# Patient Record
Sex: FEMALE | Race: WHITE | Employment: OTHER | URBAN - METROPOLITAN AREA
[De-identification: names, ages, dates, MRNs, and addresses within clinical notes are randomized per-mention and may not be internally consistent; named-entity substitution may affect disease eponyms.]

---

## 2017-12-29 ENCOUNTER — HOSPITAL ENCOUNTER (OUTPATIENT)
Dept: MAMMOGRAPHY | Age: 77
Discharge: HOME | End: 2017-12-29
Attending: INTERNAL MEDICINE
Payer: MEDICARE

## 2017-12-29 DIAGNOSIS — Z12.31 VISIT FOR SCREENING MAMMOGRAM: ICD-10-CM

## 2017-12-29 PROCEDURE — 77067 SCR MAMMO BI INCL CAD: CPT

## 2018-03-07 PROBLEM — R30.0 DYSURIA: Status: ACTIVE | Noted: 2018-03-07

## 2018-03-07 PROBLEM — B37.31 YEAST VAGINITIS: Status: ACTIVE | Noted: 2018-03-07

## 2018-04-03 ENCOUNTER — HOSPITAL ENCOUNTER (OUTPATIENT)
Dept: PHYSICAL THERAPY | Age: 78
Discharge: HOME | End: 2018-04-03
Payer: MEDICARE

## 2018-04-03 ENCOUNTER — HOSPITAL ENCOUNTER (OUTPATIENT)
Dept: SURGERY | Age: 78
Discharge: HOME | End: 2018-04-03
Payer: MEDICARE

## 2018-04-03 VITALS
BODY MASS INDEX: 33.43 KG/M2 | HEIGHT: 66 IN | TEMPERATURE: 96.9 F | WEIGHT: 208 LBS | DIASTOLIC BLOOD PRESSURE: 70 MMHG | OXYGEN SATURATION: 96 % | HEART RATE: 59 BPM | SYSTOLIC BLOOD PRESSURE: 149 MMHG | RESPIRATION RATE: 18 BRPM

## 2018-04-03 LAB
ANION GAP SERPL CALC-SCNC: 6 MMOL/L (ref 7–16)
APPEARANCE UR: CLEAR
APTT PPP: 43 SEC (ref 23.2–35.3)
BACTERIA SPEC CULT: NORMAL
BASOPHILS # BLD: 0.1 K/UL (ref 0–0.2)
BASOPHILS NFR BLD: 1 % (ref 0–2)
BILIRUB UR QL: NEGATIVE
BUN SERPL-MCNC: 13 MG/DL (ref 8–23)
CALCIUM SERPL-MCNC: 9.1 MG/DL (ref 8.3–10.4)
CHLORIDE SERPL-SCNC: 102 MMOL/L (ref 98–107)
CO2 SERPL-SCNC: 30 MMOL/L (ref 21–32)
COLOR UR: YELLOW
CREAT SERPL-MCNC: 0.72 MG/DL (ref 0.6–1)
DIFFERENTIAL METHOD BLD: NORMAL
EOSINOPHIL # BLD: 0.3 K/UL (ref 0–0.8)
EOSINOPHIL NFR BLD: 5 % (ref 0.5–7.8)
ERYTHROCYTE [DISTWIDTH] IN BLOOD BY AUTOMATED COUNT: 13 % (ref 11.9–14.6)
GLUCOSE SERPL-MCNC: 103 MG/DL (ref 65–100)
GLUCOSE UR STRIP.AUTO-MCNC: NEGATIVE MG/DL
HCT VFR BLD AUTO: 42 % (ref 35.8–46.3)
HGB BLD-MCNC: 13.9 G/DL (ref 11.7–15.4)
HGB UR QL STRIP: NEGATIVE
IMM GRANULOCYTES # BLD: 0 K/UL (ref 0–0.5)
IMM GRANULOCYTES NFR BLD AUTO: 0 % (ref 0–5)
INR PPP: 0.9
KETONES UR QL STRIP.AUTO: NEGATIVE MG/DL
LEUKOCYTE ESTERASE UR QL STRIP.AUTO: NEGATIVE
LYMPHOCYTES # BLD: 1.9 K/UL (ref 0.5–4.6)
LYMPHOCYTES NFR BLD: 27 % (ref 13–44)
MCH RBC QN AUTO: 29.5 PG (ref 26.1–32.9)
MCHC RBC AUTO-ENTMCNC: 33.1 G/DL (ref 31.4–35)
MCV RBC AUTO: 89.2 FL (ref 79.6–97.8)
MONOCYTES # BLD: 0.5 K/UL (ref 0.1–1.3)
MONOCYTES NFR BLD: 7 % (ref 4–12)
NEUTS SEG # BLD: 4.2 K/UL (ref 1.7–8.2)
NEUTS SEG NFR BLD: 60 % (ref 43–78)
NITRITE UR QL STRIP.AUTO: NEGATIVE
PH UR STRIP: 7 [PH] (ref 5–9)
PLATELET # BLD AUTO: 258 K/UL (ref 150–450)
PMV BLD AUTO: 11.3 FL (ref 10.8–14.1)
POTASSIUM SERPL-SCNC: 4.1 MMOL/L (ref 3.5–5.1)
PROT UR STRIP-MCNC: NEGATIVE MG/DL
PROTHROMBIN TIME: 12.5 SEC (ref 11.5–14.5)
RBC # BLD AUTO: 4.71 M/UL (ref 4.05–5.25)
SERVICE CMNT-IMP: NORMAL
SODIUM SERPL-SCNC: 138 MMOL/L (ref 136–145)
SP GR UR REFRACTOMETRY: 1.01 (ref 1–1.02)
UROBILINOGEN UR QL STRIP.AUTO: 0.2 EU/DL (ref 0.2–1)
WBC # BLD AUTO: 7.1 K/UL (ref 4.3–11.1)

## 2018-04-03 PROCEDURE — 80048 BASIC METABOLIC PNL TOTAL CA: CPT | Performed by: PHYSICIAN ASSISTANT

## 2018-04-03 PROCEDURE — G8980 MOBILITY D/C STATUS: HCPCS

## 2018-04-03 PROCEDURE — 97161 PT EVAL LOW COMPLEX 20 MIN: CPT

## 2018-04-03 PROCEDURE — G8978 MOBILITY CURRENT STATUS: HCPCS

## 2018-04-03 PROCEDURE — 85730 THROMBOPLASTIN TIME PARTIAL: CPT | Performed by: PHYSICIAN ASSISTANT

## 2018-04-03 PROCEDURE — G8979 MOBILITY GOAL STATUS: HCPCS

## 2018-04-03 PROCEDURE — 85610 PROTHROMBIN TIME: CPT | Performed by: PHYSICIAN ASSISTANT

## 2018-04-03 PROCEDURE — 87641 MR-STAPH DNA AMP PROBE: CPT | Performed by: PHYSICIAN ASSISTANT

## 2018-04-03 PROCEDURE — 85025 COMPLETE CBC W/AUTO DIFF WBC: CPT | Performed by: PHYSICIAN ASSISTANT

## 2018-04-03 PROCEDURE — 81003 URINALYSIS AUTO W/O SCOPE: CPT | Performed by: PHYSICIAN ASSISTANT

## 2018-04-03 RX ORDER — CHOLECALCIFEROL (VITAMIN D3) 125 MCG
CAPSULE ORAL DAILY
COMMUNITY

## 2018-04-03 RX ORDER — ACETAMINOPHEN 500 MG
TABLET ORAL
COMMUNITY
End: 2018-10-22

## 2018-04-03 NOTE — PERIOP NOTES
Recent Results (from the past 12 hour(s))   CBC WITH AUTOMATED DIFF    Collection Time: 04/03/18 10:30 AM   Result Value Ref Range    WBC 7.1 4.3 - 11.1 K/uL    RBC 4.71 4.05 - 5.25 M/uL    HGB 13.9 11.7 - 15.4 g/dL    HCT 42.0 35.8 - 46.3 %    MCV 89.2 79.6 - 97.8 FL    MCH 29.5 26.1 - 32.9 PG    MCHC 33.1 31.4 - 35.0 g/dL    RDW 13.0 11.9 - 14.6 %    PLATELET 026 100 - 356 K/uL    MPV 11.3 10.8 - 14.1 FL    DF AUTOMATED      NEUTROPHILS 60 43 - 78 %    LYMPHOCYTES 27 13 - 44 %    MONOCYTES 7 4.0 - 12.0 %    EOSINOPHILS 5 0.5 - 7.8 %    BASOPHILS 1 0.0 - 2.0 %    IMMATURE GRANULOCYTES 0 0.0 - 5.0 %    ABS. NEUTROPHILS 4.2 1.7 - 8.2 K/UL    ABS. LYMPHOCYTES 1.9 0.5 - 4.6 K/UL    ABS. MONOCYTES 0.5 0.1 - 1.3 K/UL    ABS. EOSINOPHILS 0.3 0.0 - 0.8 K/UL    ABS. BASOPHILS 0.1 0.0 - 0.2 K/UL    ABS. IMM.  GRANS. 0.0 0.0 - 0.5 K/UL   PROTHROMBIN TIME + INR    Collection Time: 04/03/18 10:30 AM   Result Value Ref Range    Prothrombin time 12.5 11.5 - 14.5 sec    INR 0.9     PTT    Collection Time: 04/03/18 10:30 AM   Result Value Ref Range    aPTT 43.0 (H) 23.2 - 33.1 SEC   METABOLIC PANEL, BASIC    Collection Time: 04/03/18 10:30 AM   Result Value Ref Range    Sodium 138 136 - 145 mmol/L    Potassium 4.1 3.5 - 5.1 mmol/L    Chloride 102 98 - 107 mmol/L    CO2 30 21 - 32 mmol/L    Anion gap 6 (L) 7 - 16 mmol/L    Glucose 103 (H) 65 - 100 mg/dL    BUN 13 8 - 23 MG/DL    Creatinine 0.72 0.6 - 1.0 MG/DL    GFR est AA >60 >60 ml/min/1.73m2    GFR est non-AA >60 >60 ml/min/1.73m2    Calcium 9.1 8.3 - 10.4 MG/DL   URINALYSIS W/ RFLX MICROSCOPIC    Collection Time: 04/03/18 10:30 AM   Result Value Ref Range    Color YELLOW      Appearance CLEAR      Specific gravity 1.010 1.001 - 1.023      pH (UA) 7.0 5.0 - 9.0      Protein NEGATIVE  NEG mg/dL    Glucose NEGATIVE  mg/dL    Ketone NEGATIVE  NEG mg/dL    Bilirubin NEGATIVE  NEG      Blood NEGATIVE  NEG      Urobilinogen 0.2 0.2 - 1.0 EU/dL    Nitrites NEGATIVE  NEG      Leukocyte Esterase NEGATIVE  NEG

## 2018-04-03 NOTE — PROGRESS NOTES
Katie Willis  : (25 y.o.) [address] [facility] [address]  [phone]       Physical Therapy Prehab Plan of Treatment and Evaluation Summary:4/3/2018    ICD-10: Treatment Diagnosis:   · Pain in Left Knee (M25.562)  · Stiffness of Left Knee, Not elsewhere classified (M25.662)  · Difficulty in walking, Not elsewhere classified (R26.2)  Precautions/Allergies:   Review of patient's allergies indicates no known allergies. MEDICAL/REFERRING DIAGNOSIS:  Unilateral primary osteoarthritis, left knee [M17.12]  REFERRING PHYSICIAN: Kulwinder Myers, *  DATE OF SURGERY: 18   Assessment:   Comments:  Pt. Plans to go home with spouse. She had a right tka 2 years ago. PROBLEM LIST (Impacting functional limitations):  Ms. Steve Kiran presents with the following left lower extremity(s) problems:  1. Strength  2. Range of Motion  3. Home Exercise Program  4. Pain   INTERVENTIONS PLANNED:  1. Home Exercise Program  2. Educational Discussion     TREATMENT PLAN: Effective Dates: 4/3/2018 TO 4/3/2018. Frequency/Duration: Patient to continue to perform home exercise program at least twice per day up until her surgery. GOALS: (Goals have been discussed and agreed upon with patient.)  Discharge Goals: Time Frame: 1 Day  1. Patient will demonstrate independence with a home exercise program designed to increase strength, range of motion and pain control to minimize functional deficits and optimize patient for total joint replacement. Rehabilitation Potential For Stated Goals: Good  Regarding [de-identified] M Alphonso's therapy, I certify that the treatment plan above will be carried out by a therapist or under their direction.   Thank you for this referral,  Chester Hansen, PT               HISTORY:   Present Symptoms:  Pain Intensity 1:  (3 at worst)  Pain Location 1: Knee   History of Present Injury/Illness (Reason for Referral):  Medical/Referring Diagnosis: Unilateral primary osteoarthritis, left knee [M17.12]   Past Medical History/Comorbidities:   Ms. Tres Pinon  has a past medical history of Asthma; CAD (coronary artery disease); COPD; Graves' disease; Hyperlipidemia (11/16/2012); Hypertension; Menopause; Obesity (BMI 30-39.9); Routine health maintenance (11/16/2012); Shortness of breath (5/10/2016); Status post total right knee replacement (2/1/2016); Thyroid disease; Tremor (11/16/2012); and Ulcerative colitis ([facility] [address] (11/7/2013). She also has no past medical history of Adverse effect of anesthesia; Difficult intubation; Malignant hyperthermia due to anesthesia; Nausea & vomiting; Pseudocholinesterase deficiency; or Unspecified adverse effect of anesthesia. Ms. Tres Pinon  has a past surgical history that includes hx lap cholecystectomy; hx hysterectomy; pr cardiac surg procedure unlist (2012); hx heent; hx tonsil and adenoidectomy; hx heent; hx ptca (06/13/12); hx knee replacement (Right, 02/01/2016); and hx cataract removal (Left, 08/15/2016).   Social History/Living Environment:   Home Environment: Private residence  # Steps to Enter: 1  Rails to Enter: Yes  One/Two Story Residence: One story  Living Alone: No  Support Systems: Spouse/Significant Other/Partner  Patient Expects to be Discharged to[de-identified] Private residence  Current DME Used/Available at Home: Elieser Miners, rollator, Elieser Miners, rolling, Jacqulyne Skeeters, straight, Commode, bedside, Shower chair, Wheelchair  Tub or Shower Type: Shower  Work/Activity:  retired  Dominant Side:  RIGHT  Current Medications:  See Pre-assessment nursing note   Number of Personal Factors/Comorbidities that affect the Plan of Care: 1-2: MODERATE COMPLEXITY   EXAMINATION:   ADLs (Current Functional Status):   Ambulation:  [x] Independent  [] Walk Indoors Only  [] Walk Outdoors  [] Use Assistive Device  [] Use Wheelchair Only Dressing:  [x] Independent  Requires Assistance from Someone for:  [] Sock/Shoes  [] Pants  [] Everything   Bathing/Showering:   [x] Independent  [] Requires Assistance from Someone  [] Sponge Bath Only Household Activities:  [x] Routine house and yard work  [] Light Housework Only  [] None   Observation/Orthostatic Postural Assessment:       ROM/Flexibility:   Gross Assessment: Yes  AROM: Within functional limits (right LE)                LLE Assessment  LLE Assessment (WDL): Exception to WDL  LLE AROM  L Knee Flexion: 121  L Knee Extension: 5          Strength:   Gross Assessment: Yes  Strength: Generally decreased, functional (right LE)       LLE Strength  L Knee Flexion: 4  L Knee Extension: 4          Functional Mobility:    Gross Assessment: Yes    Gait Description (WDL): Exceptions to WDL  Stand to Sit: Independent, Additional time  Sit to Stand: Independent, Additional time  Distance (ft): 250 Feet (ft)  Ambulation - Level of Assistance: Independent  Speed/Lupe: Delayed  Stance: Left decreased  Gait Abnormalities: Antalgic          Balance:    Sitting: Intact  Standing: Intact   Body Structures Involved:  1. Bones  2. Joints  3. Muscles  4. Ligaments Body Functions Affected:  1. Movement Related Activities and Participation Affected:  1. Mobility   Number of elements that affect the Plan of Care: 3: MODERATE COMPLEXITY   CLINICAL PRESENTATION:   Presentation: Stable and uncomplicated: LOW COMPLEXITY   CLINICAL DECISION MAKING:   Outcome Measure: Tool Used: Lower Extremity Functional Scale (LEFS)  Score:  Initial: 63/80 Most Recent: X/80 (Date: -- )   Interpretation of Score: 20 questions each scored on a 5 point scale with 0 representing \"extreme difficulty or unable to perform\" and 4 representing \"no difficulty\". The lower the score, the greater the functional disability. 80/80 represents no disability. Minimal detectable change is 9 points. Score 80 79-65 64-49 48-33 32-17 16-1 0   Modifier CH CI CJ CK CL CM CN     ?  Mobility - Walking and Moving Around:     - CURRENT STATUS: CJ - 20%-39% impaired, limited or restricted    - GOAL STATUS: CJ - 20%-39% impaired, limited or restricted    - D/C STATUS:  CJ - 20%-39% impaired, limited or restricted  Medical Necessity:   · Ms. Chong Sheridan is expected to optimize her lower extremity strength and ROM in preparation for joint replacement surgery. Reason for Services/Other Comments:  · Achieve baseline assesment of musculoskeletal system, functional mobility and home environment. , educate in PT HEP in preparation for surgery, educate in hospital plan of care. Use of outcome tool(s) and clinical judgement create a POC that gives a: Clear prediction of patient's progress: LOW COMPLEXITY   TREATMENT:   Treatment/Session Assessment:  Patient was instructed in PT- HEP to increase strength and ROM in LEs. Answered all questions. · Post session pain:  No complaints  · Compliance with Program/Exercises: compliant most of the time.   Total Treatment Duration:  PT Patient Time In/Time Out  Time In: 1030  Time Out: [address]

## 2018-04-03 NOTE — PROGRESS NOTES
04/03/18 1030   Oxygen Therapy   O2 Sat (%) 95 %   Pulse via Oximetry 65 beats per minute   O2 Device Room air   Pre-Treatment   Breath Sounds Bilateral Clear;Diminished   Pre FEV1 (liters) 1.6 liters   % Predicted 75   Incentive Spirometry Treatment   Actual Volume (ml) 1750 ml     Initial respiratory Assessment completed with pt. Pt was interviewed and evaluated in Joint camp prior to surgery. Patient ID:  Chandrakant Waggoner  [id number]  55 y.o.  1940  Surgeon: Dr. Jannet Gonsalez  Date of Surgery: 4/23/2018  Procedure: Total Left Knee Arthroplasty  Primary Care Physician: Candace Merino -[phone]  Specialists: [facility] P[ULMONARY- LAST APPOINTMENT DEC. 2016                                 Pt instructed in the use of Incentive Spirometry. Pt instructed to bring Incentive Spirometer back on date of surgery & to start using Is upon return to pt room. Pt taught proper cough technique    History of smoking:  SOCIALLY FOR 10 YEARS                                 FORMER                          Quit date:            Secondhand smoke:SPOUSE      Past procedures with Oxygen desaturation:NONE    Past Medical History:   Diagnosis Date    Asthma with COPD ([address]     Managed with daily and PRN inhaler     CAD (coronary artery disease)     05/2012: 3.0x 18 Xience MLAD, POBA D1, 11/2017: Normal stress MPI. Followed by [facility].  Environmental and seasonal allergies     H/O echocardiogram 11/14/2016    EF 71%    History of nuclear stress test 12/04/2017    EF: 70%    Hx of Graves' disease     Hyperlipidemia 11/16/2012    controlled with medication     Hypertension     controlled with medication     Menopause     Obesity (BMI 30-39. 9)     Osteoarthritis     Routine health maintenance 11/16/2012    S/P PTCA (percutaneous transluminal coronary angioplasty) 2012    Patient takes daily 81 mg ASA-Followed by [facility]     Shortness of breath 05/10/2016    resolved-patient reports no recent SOB     Status post total right knee replacement 2016    Thyroid disease     Tremor 2012    hands     Ulcerative colitis ([address] 2013                                   HX OF ASTHMA, COPD, PNA X 3                                                                                                                      Respiratory history:DENIES SOB                                                               Respiratory meds:  SYMBICORT , ALBUTEROL PRN                                       FAMILY PRESENT:            SPOUSE,                                                                                          PAST SLEEP STUDY:                          NO  HX OF CLAIRE:                                     NO                                     CLAIRE assessment:                                                         PHYSICAL EXAM   Body mass index is 33.57 kg/(m^2).    Visit Vitals    /70 (BP 1 Location: Left arm, BP Patient Position: At rest;Sitting)    Pulse (!) 59    Temp 96.9 °F (36.1 °C)    Resp 18    Ht 5' 6\" (1.676 m)    Wt 94.3 kg (208 lb)    SpO2 96%    BMI 33.57 kg/m2     Neck circumference: 39     cm    Loud snoring:                                 DENIES    Witnessed apnea or wakening gasping or choking:,             DENIES,                                                                                                  Awakens with headaches:                                                  DENIES    Morning or daytime tiredness/ sleepiness:                    DENIES                                                                                        Dry mouth or sore throat in morning:                                                                                       DENIES    Wagner stage:  2-3    SACS score:8    STOP/BAN                              CPAP:                       NONE                             ALBUTEROL NEB Q6 PRN          SPACER Referrals:    Pt. Phone Number:

## 2018-04-03 NOTE — PERIOP NOTES
Patient verified name, , and surgery as listed in [facility]. Type 3 surgery, PAT joint assessment complete. Labs per surgeon: cbc, bmp, ua, pt/inr, ptt, mrsa/mssa swab, T&S DOS; PTT results to be reviewed by anesthesia, all other results within anesthesia limits. Labs per anesthesia protocol: All required lab work included in surgeon's orders. EKG: completed 17; results within anesthesia limits. Cardiology note with surgical clearance (18), Echo (16), Nuclear Perfusion Study (17), and Cath report (5/15/12) placed on chart for anesthesia reference. Patient did not bring cardiac stent card to PAT appointment. Hibiclens and instructions to return bottle on DOS given per hospital policy. Nasal Swab collected per MD order and instructions for Mupirocin nasal ointment if required. Patient provided with handouts including Guide to Surgery, Pain Management, Hand Hygiene, Blood Transfusion Education, and [place] [facility] Brochure. Patient answered medical/surgical history questions at their best of ability. All prior to admission medications documented in [facility]. Original medication prescription bottle NOT visualized during patient appointment. Patient instructed to hold all vitamins 7 days prior to surgery and NSAIDS 5 days prior to surgery. Medications to be held: all vitamins/supplements. Patient instructed to continue previous medications as prescribed prior to surgery and to take the following medications the day of surgery according to anesthesia guidelines with a small sip of water: Metoprolol, Levothyroxine, Simvastatin, Symbicort, Flonase, ProAir, Tylenol if needed, and 81 mg ASA. Patient instructed to bring all inhalers, bottle of Hibiclens, and incentive spirometer to the hospital on the DOS. Patient teach back successful and patient demonstrates knowledge of instruction.

## 2018-04-03 NOTE — PERIOP NOTES
Labs dated 4/3/18 routed via [address] [facility] to patients PCP, Dr. Ivon Paul, per Dr. Bryanna Silva' request.

## 2018-04-03 NOTE — PERIOP NOTES
Dr. Gilford Peer (anesthesia) reviewed PTT results. Patient is to return for repeat PTT prior to surgery. PTT order signed and held in [address] [facility]. Spoke with patient over the phone and informed her of abnormal PTT results. Patient instructed to come to Entrance B/Outpatient lab any week day prior to surgery, between 0830 and 1600, to have PTT rechecked. Patient verbalized understanding. Charge nurse to follow up.

## 2018-04-05 ENCOUNTER — HOSPITAL ENCOUNTER (OUTPATIENT)
Dept: LAB | Age: 78
Discharge: HOME | End: 2018-04-05
Attending: ORTHOPAEDIC SURGERY
Payer: MEDICARE

## 2018-04-05 LAB — APTT PPP: 39.3 SEC (ref 23.2–35.3)

## 2018-04-05 PROCEDURE — 36415 COLL VENOUS BLD VENIPUNCTURE: CPT | Performed by: ORTHOPAEDIC SURGERY

## 2018-04-05 PROCEDURE — 85730 THROMBOPLASTIN TIME PARTIAL: CPT | Performed by: ORTHOPAEDIC SURGERY

## 2018-04-09 NOTE — ADVANCED PRACTICE NURSE
Total Joint Surgery Preoperative Chart Review      Patient ID:  Nahun Saha  [id number]  72 y.o.  1940  Surgeon: Dr. Gage Cordero  Date of Surgery: 4/23/2018  Procedure: Total Left Knee Arthroplasty  Primary Care Physician: Michael Garner -[phone]  Specialty Physician(s):      Subjective:   Nahun Saha is a 77 y.o. WHITE OR  female who presents for preoperative evaluation for Total Left Knee arthroplasty. This is a preoperative chart review note based on data collected by the nurse at the surgical Pre-Assessment visit. Past Medical History:   Diagnosis Date    Asthma with COPD ([facility] [address]     Managed with daily and PRN inhaler     CAD (coronary artery disease)     05/2012: 3.0x 18 Xience MLAD, POBA D1, 11/2017: Normal stress MPI. Followed by [facility].  Environmental and seasonal allergies     H/O echocardiogram 11/14/2016    EF 71%    History of nuclear stress test 12/04/2017    EF: 70%    Hx of Graves' disease     Hyperlipidemia 11/16/2012    controlled with medication     Hypertension     controlled with medication     Menopause     Obesity (BMI 30-39. 9)     Osteoarthritis     Routine health maintenance 11/16/2012    S/P PTCA (percutaneous transluminal coronary angioplasty) 2012    Patient takes daily 81 mg ASA-Followed by [facility]     Shortness of breath 05/10/2016    resolved-patient reports no recent SOB     Status post total right knee replacement 2/1/2016    Thyroid disease     Tremor 11/16/2012    hands     Ulcerative colitis ([facility] [address] 11/7/2013      Past Surgical History:   Procedure Laterality Date    HX ANKLE FRACTURE TX Right 1956    HX CATARACT REMOVAL Left 08/15/2016    HX HEENT  1981    thyroid radioactive iodine tx    HX HEENT      Bridge in Mouth    HX HYSTERECTOMY  1983    HX KNEE REPLACEMENT Right 02/01/2016    HX LAP CHOLECYSTECTOMY  2002    HX PTCA  2012    3.0x1.8 Xience mLAD POBA D1    [id number] [employer] Family History   Problem Relation Age of Onset    Heart Disease Mother     Arthritis-osteo Father     Hypertension Sister     Diabetes Sister     Hypertension Brother     Hypertension Brother     Hypertension Brother     Cancer Brother     Hypertension Brother     Breast Cancer Paternal Aunt 79      Social History   Substance Use Topics    Smoking status: Former Smoker     Packs/day: 2.00     Years: 10.00     Types: Cigarettes     Quit date: 7/5/1970    Smokeless tobacco: Never Used      Comment: social smoker 1-2 qd    Alcohol use Yes      Comment: occ,. social       Prior to Admission medications    Medication Sig Start Date End Date Taking? Authorizing Provider   acetaminophen (TYLENOL EXTRA STRENGTH) 500 mg tablet Take  by mouth every six (6) hours as needed for Pain. Yes Historical Provider   cholecalciferol, vitamin D3, (VITAMIN D3) 2,000 unit tab Take  by mouth daily. Yes Historical Provider   losartan (COZAAR) 100 mg tablet TAKE 1 TABLET BY MOUTH  DAILY FOR HYPERTENSION 3/19/18  Yes Delmar Narvaez MD   levothyroxine (SYNTHROID) 137 mcg tablet Take 137 mcg by mouth Daily (before breakfast). 10/27/17  Yes Delmar Narvaez MD   metoprolol succinate (TOPROL-XL) 25 mg XL tablet Take 1 Tab by mouth daily. 10/27/17  Yes Delmar Narvaez MD   simvastatin (ZOCOR) 40 mg tablet Take 1 Tab by mouth nightly. Patient taking differently: Take 40 mg by mouth daily. 10/27/17  Yes Delmar Narvaez MD   albuterol (PROVENTIL HFA, VENTOLIN HFA, PROAIR HFA) 90 mcg/actuation inhaler Take 2 Puffs by inhalation every four (4) hours as needed for Wheezing. 10/26/17  Yes Ayala Maier NP   fluticasone (FLONASE) 50 mcg/actuation nasal spray 2 Sprays by Both Nostrils route daily. 8/15/17  Yes Mariely Wynn MD   aspirin delayed-release 81 mg tablet Take  by mouth daily.    Yes Historical Provider   budesonide-formoterol (SYMBICORT) 80-4.5 mcg/actuation HFAA inhaler Take 2 Puffs by inhalation two (2) times a day. 12/14/16  Yes Judson Ren MD   multivitamin (DAILY MULTIPLE) tablet Take 1 Tab by mouth daily. Hold 7 days prior to surgery per anesthesia protocol   Yes Historical Provider     No Known Allergies       Objective:     Physical Exam:     Visit Vitals    /70 (BP 1 Location: Left arm, BP Patient Position: At rest;Sitting)    Pulse (!) 59    Temp 96.9 °F (36.1 °C)    Resp 18    Ht 5' 6\" (1.676 m)    Wt 94.3 kg (208 lb)    SpO2 96%    BMI 33.57 kg/m2       ECG:    EKG Results     None          Data Review:   Labs:     reviewed    Problem List:  )  Patient Active Problem List   Diagnosis Code    Coronary atherosclerosis of native coronary artery I25.10    S/P PTCA (percutaneous transluminal coronary angioplasty) Z98.61    Dyslipidemia E78.5    Hypertensive cardiovascular disease I11.9    Obesity (BMI 30-39. 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 Patient would benefit from inpatient hospitalization with total knee surgery. Albuterol every 6 hours as need during hospitalization.          Signed By: SADAF Mancuso    April 9, 2018

## 2018-04-18 PROBLEM — B37.31 YEAST VAGINITIS: Status: RESOLVED | Noted: 2018-03-07 | Resolved: 2018-04-18

## 2018-04-18 PROBLEM — R30.0 DYSURIA: Status: RESOLVED | Noted: 2018-03-07 | Resolved: 2018-04-18

## 2018-04-19 NOTE — H&P
[id number] [facility]  Pre Operative History and Physical Exam    Patient ID:  Yesenia Manrique  [id number]  67 y.o.  1940    Today: April 19, 2018       Assessment:   1. Arthritis of the left knee        Plan:    1. Proceed with scheduled Procedure(s) (LRB):  KNEE ARTHROPLASTY TOTAL LEFT/ SUE/ FNB (Left)            CC: Left knee pain    HPI:   The patient has end stage arthritis of the left knee. The patient was evaluated and examined during a consultation prior to this office visit. There have been no changes to the patient's orthopedic condition since the initial consultation. The patient has failed previous conservative treatment for this condition including antiinflammatories , and lifestyle modifications. The necessity for joint replacement is present. The patient will be admitted the day of surgery for Procedure(s) (LRB):  KNEE ARTHROPLASTY TOTAL LEFT/ SUE/ FNB (Left)      Past Medical/Surgical History:  Past Medical History:   Diagnosis Date    Asthma with COPD ([address]     Managed with daily and PRN inhaler     CAD (coronary artery disease)     05/2012: 3.0x 18 Xience MLAD, POBA D1, 11/2017: Normal stress MPI. Followed by [address] Card[facility].  Environmental and seasonal allergies     H/O echocardiogram 11/14/2016    EF 71%    History of nuclear stress test 12/04/2017    EF: 70%    Hx of Graves' disease     Hyperlipidemia 11/16/2012    controlled with medication     Hypertension     controlled with medication     Menopause     Obesity (BMI 30-39. 9)     Osteoarthritis     Routine health maintenance 11/16/2012    S/P PTCA (percutaneous transluminal coronary angioplasty) 2012    Patient takes daily 81 mg ASA-Followed by [address] Cardiology     Shortness of breath 05/10/2016    resolved-patient reports no recent SOB     Status post total right knee replacement 2/1/2016    Thyroid disease     Tremor 11/16/2012    hands     Ulcerative colitis ([address] 11/7/2013     Past Surgical History:   Procedure Laterality Date    HX ANKLE FRACTURE TX Right 1956    HX CATARACT REMOVAL Left 08/15/2016    HX HEENT  1981    thyroid radioactive iodine tx    HX HEENT      Bridge in Mouth    HX HYSTERECTOMY  1983    HX KNEE REPLACEMENT Right 02/01/2016    HX LAP CHOLECYSTECTOMY  2002    HX PTCA  2012    3.0x1.8 Xience mLAD POBA D1    HX TONSIL AND ADENOIDECTOMY  1959        Allergies: No Known Allergies     Physical Exam:   General: NAD, Alert, Oriented, Appears their stated age     [de-identified]: NC/AT, PERRL    Skin: No rashes, lesions or wounds seen      Psych: normal affect      Heart: Regular Rate, Rhythm     Lungs: unlabored respirations, normal breath sounds     Abdomen: Soft and non-distended     Ortho: Pain with limited ROM of the left knee    Neuro: no focal defects, sensation is equal bilaterally     Lymph: no lymphadenopathy     Meds:   No current facility-administered medications for this encounter. Current Outpatient Prescriptions   Medication Sig    acetaminophen (TYLENOL EXTRA STRENGTH) 500 mg tablet Take  by mouth every six (6) hours as needed for Pain.  cholecalciferol, vitamin D3, (VITAMIN D3) 2,000 unit tab Take  by mouth daily.  losartan (COZAAR) 100 mg tablet TAKE 1 TABLET BY MOUTH  DAILY FOR HYPERTENSION    levothyroxine (SYNTHROID) 137 mcg tablet Take 137 mcg by mouth Daily (before breakfast).  metoprolol succinate (TOPROL-XL) 25 mg XL tablet Take 1 Tab by mouth daily.  simvastatin (ZOCOR) 40 mg tablet Take 1 Tab by mouth nightly. (Patient taking differently: Take 40 mg by mouth daily.)    albuterol (PROVENTIL HFA, VENTOLIN HFA, PROAIR HFA) 90 mcg/actuation inhaler Take 2 Puffs by inhalation every four (4) hours as needed for Wheezing.  fluticasone (FLONASE) 50 mcg/actuation nasal spray 2 Sprays by Both Nostrils route daily.  aspirin delayed-release 81 mg tablet Take  by mouth daily.     budesonide-formoterol (SYMBICORT) 80-4.5 mcg/actuation HFAA inhaler Take 2 Puffs by inhalation two (2) times a day.  multivitamin (DAILY MULTIPLE) tablet Take 1 Tab by mouth daily. Hold 7 days prior to surgery per anesthesia protocol         Labs:  Hospital Outpatient Visit on 04/05/2018   Component Date Value Ref Range Status    aPTT 04/05/2018 39.3* 23.2 - 35.3 SEC Final    Comment: Heparin Therapeutic Range = 74 - 123 seconds  In addition to factor deficiency, monitoring heparin therapy, etc., evaluation of a prolonged aPTT result should include consideration of preanalytic variables such as heparin flush contamination, specimen integrity issues, etc.  ** Note new reference range and method [facility] Outpatient Visit on 04/03/2018   Component Date Value Ref Range Status    WBC 04/03/2018 7.1  4.3 - 11.1 K/uL Final    RBC 04/03/2018 4.71  4.05 - 5.25 M/uL Final    HGB 04/03/2018 13.9  11.7 - 15.4 g/dL Final    HCT 04/03/2018 42.0  35.8 - 46.3 % Final    MCV 04/03/2018 89.2  79.6 - 97.8 FL Final    MCH 04/03/2018 29.5  26.1 - 32.9 PG Final    MCHC 04/03/2018 33.1  31.4 - 35.0 g/dL Final    RDW 04/03/2018 13.0  11.9 - 14.6 % Final    PLATELET 93/99/6951 897  150 - 450 K/uL Final    MPV 04/03/2018 11.3  10.8 - 14.1 FL Final    DF 04/03/2018 AUTOMATED    Final    NEUTROPHILS 04/03/2018 60  43 - 78 % Final    LYMPHOCYTES 04/03/2018 27  13 - 44 % Final    MONOCYTES 04/03/2018 7  4.0 - 12.0 % Final    EOSINOPHILS 04/03/2018 5  0.5 - 7.8 % Final    BASOPHILS 04/03/2018 1  0.0 - 2.0 % Final    IMMATURE GRANULOCYTES 04/03/2018 0  0.0 - 5.0 % Final    ABS. NEUTROPHILS 04/03/2018 4.2  1.7 - 8.2 K/UL Final    ABS. LYMPHOCYTES 04/03/2018 1.9  0.5 - 4.6 K/UL Final    ABS. MONOCYTES 04/03/2018 0.5  0.1 - 1.3 K/UL Final    ABS. EOSINOPHILS 04/03/2018 0.3  0.0 - 0.8 K/UL Final    ABS. BASOPHILS 04/03/2018 0.1  0.0 - 0.2 K/UL Final    ABS. IMM.  GRANS. 04/03/2018 0.0  0.0 - 0.5 K/UL Final    Prothrombin time 04/03/2018 12.5  11.5 - 14.5 sec Final    INR 04/03/2018 0.9 Final    aPTT 04/03/2018 43.0* 23.2 - 35.3 SEC Final    Comment: Heparin Therapeutic Range = 74 - 123 seconds  In addition to factor deficiency, monitoring heparin therapy, etc., evaluation of a prolonged aPTT result should include consideration of preanalytic variables such as heparin flush contamination, specimen integrity issues, etc.  ** Note new reference range and method **      Sodium 04/03/2018 138  136 - 145 mmol/L Final    Potassium 04/03/2018 4.1  3.5 - 5.1 mmol/L Final    Chloride 04/03/2018 102  98 - 107 mmol/L Final    CO2 04/03/2018 30  21 - 32 mmol/L Final    Anion gap 04/03/2018 6* 7 - 16 mmol/L Final    Glucose 04/03/2018 103* 65 - 100 mg/dL Final    Comment: 47 - 60 mg/dl Consistent with, but not fully diagnostic of hypoglycemia. 101 - 125 mg/dl Impaired fasting glucose/consistent with pre-diabetes mellitus  > 126 mg/dl Fasting glucose consistent with overt diabetes mellitus      BUN 04/03/2018 13  8 - 23 MG/DL Final    Creatinine 04/03/2018 0.72  0.6 - 1.0 MG/DL Final    GFR est AA 04/03/2018 >60  >60 ml/min/1.73m2 Final    GFR est non-AA 04/03/2018 >60  >60 ml/min/1.73m2 Final    Comment: (NOTE)  Estimated GFR is calculated using the Modification of Diet in Renal   Disease (MDRD) Study equation, reported for both  Americans   (GFRAA) and non- Americans (GFRNA), and normalized to 1.73m2   body surface area. The physician must decide which value applies to   the patient. The MDRD study equation should only be used in   individuals age 25 or older. It has not been validated for the   following: pregnant women, patients with serious comorbid conditions,   or on certain medications, or persons with extremes of body size,   muscle mass, or nutritional status.       Calcium 04/03/2018 9.1  8.3 - 10.4 MG/DL Final    Color 04/03/2018 YELLOW    Final    Appearance 04/03/2018 CLEAR    Final    Specific gravity 04/03/2018 1.010  1.001 - 1.023   Final    pH (UA) 04/03/2018 7.0 5.0 - 9.0   Final    Protein 04/03/2018 NEGATIVE   NEG mg/dL Final    Glucose 04/03/2018 NEGATIVE   mg/dL Final    Ketone 04/03/2018 NEGATIVE   NEG mg/dL Final    Bilirubin 04/03/2018 NEGATIVE   NEG   Final    Blood 04/03/2018 NEGATIVE   NEG   Final    Urobilinogen 04/03/2018 0.2  0.2 - 1.0 EU/dL Final    Nitrites 04/03/2018 NEGATIVE   NEG   Final    Leukocyte Esterase 04/03/2018 NEGATIVE   NEG   Final    Special Requests: 04/03/2018 NO SPECIAL REQUESTS    Final    Culture result: 04/03/2018 SA target not detected. A MRSA NEGATIVE, SA NEGATIVE test result does not preclude MRSA or SA nasal colonization. Final   Office Visit on 03/07/2018   Component Date Value Ref Range Status    Color 03/07/2018 Yellow  Straw-Yellow Final    Appearance 03/07/2018 Clear  Clear Final    Glucose 03/07/2018 Negative  Negative mg/dL Final    Bilirubin 03/07/2018 Negative  Negative mg/dL Final    Ketone 03/07/2018 Negative  Negative mg/dL Final    Specific gravity 03/07/2018 1.010  1.000 - 1.030 Final    Blood 03/07/2018 Negative  Negative VALARIE/UL Final    pH (UA) 03/07/2018 6.0  5.0 - 8.0 Final    Protein 03/07/2018 Negative  Negative mg/dL Final    Urobilinogen 03/07/2018 0.2 E.U./dL  0.0 - 1.0 E.U./dL Final    Nitrites 03/07/2018 Negative  Negative Final    Leukocyte Esterase 03/07/2018 Negative  Negative Nathaniel/uL Final                 Patient Active Problem List   Diagnosis Code    Coronary atherosclerosis of native coronary artery I25.10    S/P PTCA (percutaneous transluminal coronary angioplasty) Z98.61    Dyslipidemia E78.5    Hypertensive cardiovascular disease I11.9    Obesity (BMI 30-39. 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

## 2018-04-22 ENCOUNTER — ANESTHESIA EVENT (OUTPATIENT)
Dept: SURGERY | Age: 78
DRG: 470 | End: 2018-04-22
Payer: MEDICARE

## 2018-04-23 ENCOUNTER — HOME HEALTH ADMISSION (OUTPATIENT)
Dept: HOME HEALTH SERVICES | Facility: HOME HEALTH | Age: 78
End: 2018-04-23
Payer: MEDICARE

## 2018-04-23 ENCOUNTER — HOSPITAL ENCOUNTER (INPATIENT)
Age: 78
LOS: 2 days | Discharge: HOME HEALTH | DRG: 470 | End: 2018-04-25
Attending: ORTHOPAEDIC SURGERY | Admitting: ORTHOPAEDIC SURGERY
Payer: MEDICARE

## 2018-04-23 ENCOUNTER — ANESTHESIA (OUTPATIENT)
Dept: SURGERY | Age: 78
DRG: 470 | End: 2018-04-23
Payer: MEDICARE

## 2018-04-23 DIAGNOSIS — E66.9 OBESITY (BMI 30-39.9): Chronic | ICD-10-CM

## 2018-04-23 DIAGNOSIS — Z96.652 STATUS POST TOTAL LEFT KNEE REPLACEMENT: Primary | ICD-10-CM

## 2018-04-23 LAB
ABO + RH BLD: NORMAL
BLOOD GROUP ANTIBODIES SERPL: NORMAL
GLUCOSE BLD STRIP.AUTO-MCNC: 100 MG/DL (ref 65–100)
HGB BLD-MCNC: 12.5 G/DL (ref 11.7–15.4)
SPECIMEN EXP DATE BLD: NORMAL

## 2018-04-23 PROCEDURE — 77030037364 HC TIB INST CR  DISP STRY -C: Performed by: ORTHOPAEDIC SURGERY

## 2018-04-23 PROCEDURE — 85018 HEMOGLOBIN: CPT | Performed by: PHYSICIAN ASSISTANT

## 2018-04-23 PROCEDURE — 77030034849: Performed by: ORTHOPAEDIC SURGERY

## 2018-04-23 PROCEDURE — 94760 N-INVAS EAR/PLS OXIMETRY 1: CPT

## 2018-04-23 PROCEDURE — 74011250637 HC RX REV CODE- 250/637: Performed by: ANESTHESIOLOGY

## 2018-04-23 PROCEDURE — 76010010054 HC POST OP PAIN BLOCK: Performed by: ORTHOPAEDIC SURGERY

## 2018-04-23 PROCEDURE — 74011250636 HC RX REV CODE- 250/636: Performed by: ORTHOPAEDIC SURGERY

## 2018-04-23 PROCEDURE — 77030008467 HC STPLR SKN COVD -B: Performed by: ORTHOPAEDIC SURGERY

## 2018-04-23 PROCEDURE — 74011000250 HC RX REV CODE- 250: Performed by: ORTHOPAEDIC SURGERY

## 2018-04-23 PROCEDURE — 74011250637 HC RX REV CODE- 250/637: Performed by: PHYSICIAN ASSISTANT

## 2018-04-23 PROCEDURE — 76210000006 HC OR PH I REC 0.5 TO 1 HR: Performed by: ORTHOPAEDIC SURGERY

## 2018-04-23 PROCEDURE — 77030032490 HC SLV COMPR SCD KNE COVD -B

## 2018-04-23 PROCEDURE — 99221 1ST HOSP IP/OBS SF/LOW 40: CPT | Performed by: PHYSICAL MEDICINE & REHABILITATION

## 2018-04-23 PROCEDURE — 74011000250 HC RX REV CODE- 250

## 2018-04-23 PROCEDURE — 74011000258 HC RX REV CODE- 258: Performed by: ORTHOPAEDIC SURGERY

## 2018-04-23 PROCEDURE — 77030012935 HC DRSG AQUACEL BMS -B: Performed by: ORTHOPAEDIC SURGERY

## 2018-04-23 PROCEDURE — 77030003665 HC NDL SPN BBMI -A: Performed by: ANESTHESIOLOGY

## 2018-04-23 PROCEDURE — 74011250636 HC RX REV CODE- 250/636: Performed by: ANESTHESIOLOGY

## 2018-04-23 PROCEDURE — 74011000250 HC RX REV CODE- 250: Performed by: ANESTHESIOLOGY

## 2018-04-23 PROCEDURE — 97165 OT EVAL LOW COMPLEX 30 MIN: CPT

## 2018-04-23 PROCEDURE — 77030020268 HC MISC GENERAL SUPPLY: Performed by: ORTHOPAEDIC SURGERY

## 2018-04-23 PROCEDURE — 74011250636 HC RX REV CODE- 250/636

## 2018-04-23 PROCEDURE — 82962 GLUCOSE BLOOD TEST: CPT

## 2018-04-23 PROCEDURE — 77030007880 HC KT SPN EPDRL BBMI -B: Performed by: ANESTHESIOLOGY

## 2018-04-23 PROCEDURE — 77030037363 HC FEM INST CR  DISP STRY -C: Performed by: ORTHOPAEDIC SURGERY

## 2018-04-23 PROCEDURE — 86900 BLOOD TYPING SEROLOGIC ABO: CPT | Performed by: PHYSICIAN ASSISTANT

## 2018-04-23 PROCEDURE — 77030031139 HC SUT VCRL2 J&J -A: Performed by: ORTHOPAEDIC SURGERY

## 2018-04-23 PROCEDURE — 77030011208: Performed by: ORTHOPAEDIC SURGERY

## 2018-04-23 PROCEDURE — 76010000162 HC OR TIME 1.5 TO 2 HR INTENSV-TIER 1: Performed by: ORTHOPAEDIC SURGERY

## 2018-04-23 PROCEDURE — 0SRD0J9 REPLACEMENT OF LEFT KNEE JOINT WITH SYNTHETIC SUBSTITUTE, CEMENTED, OPEN APPROACH: ICD-10-PCS | Performed by: ORTHOPAEDIC SURGERY

## 2018-04-23 PROCEDURE — 97161 PT EVAL LOW COMPLEX 20 MIN: CPT

## 2018-04-23 PROCEDURE — 77030006789 HC BLD SAW OSC STRY -C: Performed by: ORTHOPAEDIC SURGERY

## 2018-04-23 PROCEDURE — 76060000034 HC ANESTHESIA 1.5 TO 2 HR: Performed by: ORTHOPAEDIC SURGERY

## 2018-04-23 PROCEDURE — 77030006720 HC BLD PAT RMR ZIMM -B: Performed by: ORTHOPAEDIC SURGERY

## 2018-04-23 PROCEDURE — 77030035236 HC SUT PDS STRATFX BARB J&J -B: Performed by: ORTHOPAEDIC SURGERY

## 2018-04-23 PROCEDURE — 77030018836 HC SOL IRR NACL ICUM -A: Performed by: ORTHOPAEDIC SURGERY

## 2018-04-23 PROCEDURE — 77030020782 HC GWN BAIR PAWS FLX 3M -B: Performed by: ANESTHESIOLOGY

## 2018-04-23 PROCEDURE — C1776 JOINT DEVICE (IMPLANTABLE): HCPCS | Performed by: ORTHOPAEDIC SURGERY

## 2018-04-23 PROCEDURE — 77030036688 HC BLNKT CLD THER S2SG -B

## 2018-04-23 PROCEDURE — C1713 ANCHOR/SCREW BN/BN,TIS/BN: HCPCS | Performed by: ORTHOPAEDIC SURGERY

## 2018-04-23 PROCEDURE — 77030002966 HC SUT PDS J&J -A: Performed by: ORTHOPAEDIC SURGERY

## 2018-04-23 PROCEDURE — 77030002912 HC SUT ETHBND J&J -A: Performed by: ORTHOPAEDIC SURGERY

## 2018-04-23 PROCEDURE — 76942 ECHO GUIDE FOR BIOPSY: CPT | Performed by: ORTHOPAEDIC SURGERY

## 2018-04-23 PROCEDURE — 65270000029 HC RM PRIVATE

## 2018-04-23 PROCEDURE — 77030003602 HC NDL NRV BLK BBMI -B: Performed by: ANESTHESIOLOGY

## 2018-04-23 PROCEDURE — 36415 COLL VENOUS BLD VENIPUNCTURE: CPT | Performed by: PHYSICIAN ASSISTANT

## 2018-04-23 PROCEDURE — 74011250636 HC RX REV CODE- 250/636: Performed by: PHYSICIAN ASSISTANT

## 2018-04-23 PROCEDURE — 77030013727 HC IRR FAN PULSVC ZIMM -B: Performed by: ORTHOPAEDIC SURGERY

## 2018-04-23 PROCEDURE — 77030011640 HC PAD GRND REM COVD -A: Performed by: ORTHOPAEDIC SURGERY

## 2018-04-23 DEVICE — (D)CEMENT BNE HV R 40GM -- DUPE USE ITEM 353850: Type: IMPLANTABLE DEVICE | Site: KNEE | Status: FUNCTIONAL

## 2018-04-23 DEVICE — CRUCIATE RETAINING FEMORAL
Type: IMPLANTABLE DEVICE | Site: KNEE | Status: FUNCTIONAL
Brand: TRIATHLON

## 2018-04-23 DEVICE — UNIVERSAL TIBIAL BASEPLATE
Type: IMPLANTABLE DEVICE | Site: KNEE | Status: FUNCTIONAL
Brand: TRIATHLON

## 2018-04-23 DEVICE — PATELLA
Type: IMPLANTABLE DEVICE | Site: KNEE | Status: FUNCTIONAL
Brand: TRIATHLON

## 2018-04-23 DEVICE — TIBIAL BEARING INSERT
Type: IMPLANTABLE DEVICE | Site: KNEE | Status: FUNCTIONAL
Brand: TRIATHLON

## 2018-04-23 RX ORDER — SODIUM CHLORIDE 0.9 % (FLUSH) 0.9 %
5-10 SYRINGE (ML) INJECTION AS NEEDED
Status: DISCONTINUED | OUTPATIENT
Start: 2018-04-23 | End: 2018-04-23 | Stop reason: HOSPADM

## 2018-04-23 RX ORDER — CELECOXIB 200 MG/1
200 CAPSULE ORAL ONCE
Status: COMPLETED | OUTPATIENT
Start: 2018-04-23 | End: 2018-04-23

## 2018-04-23 RX ORDER — NALOXONE HYDROCHLORIDE 0.4 MG/ML
.2-.4 INJECTION, SOLUTION INTRAMUSCULAR; INTRAVENOUS; SUBCUTANEOUS
Status: DISCONTINUED | OUTPATIENT
Start: 2018-04-23 | End: 2018-04-25 | Stop reason: HOSPADM

## 2018-04-23 RX ORDER — SODIUM CHLORIDE, SODIUM LACTATE, POTASSIUM CHLORIDE, CALCIUM CHLORIDE 600; 310; 30; 20 MG/100ML; MG/100ML; MG/100ML; MG/100ML
100 INJECTION, SOLUTION INTRAVENOUS CONTINUOUS
Status: DISCONTINUED | OUTPATIENT
Start: 2018-04-23 | End: 2018-04-23 | Stop reason: HOSPADM

## 2018-04-23 RX ORDER — HYDROMORPHONE HYDROCHLORIDE 2 MG/ML
1 INJECTION, SOLUTION INTRAMUSCULAR; INTRAVENOUS; SUBCUTANEOUS
Status: DISCONTINUED | OUTPATIENT
Start: 2018-04-23 | End: 2018-04-25 | Stop reason: HOSPADM

## 2018-04-23 RX ORDER — ACETAMINOPHEN 10 MG/ML
1000 INJECTION, SOLUTION INTRAVENOUS ONCE
Status: COMPLETED | OUTPATIENT
Start: 2018-04-23 | End: 2018-04-23

## 2018-04-23 RX ORDER — SODIUM CHLORIDE 0.9 % (FLUSH) 0.9 %
5-10 SYRINGE (ML) INJECTION AS NEEDED
Status: DISCONTINUED | OUTPATIENT
Start: 2018-04-23 | End: 2018-04-25 | Stop reason: HOSPADM

## 2018-04-23 RX ORDER — HYDROMORPHONE HYDROCHLORIDE 2 MG/ML
0.5 INJECTION, SOLUTION INTRAMUSCULAR; INTRAVENOUS; SUBCUTANEOUS
Status: DISCONTINUED | OUTPATIENT
Start: 2018-04-23 | End: 2018-04-23 | Stop reason: HOSPADM

## 2018-04-23 RX ORDER — ASPIRIN 81 MG/1
81 TABLET ORAL EVERY 12 HOURS
Qty: 70 TAB | Refills: 0 | Status: SHIPPED | OUTPATIENT
Start: 2018-04-23 | End: 2018-05-28

## 2018-04-23 RX ORDER — CEFAZOLIN SODIUM/WATER 2 G/20 ML
2 SYRINGE (ML) INTRAVENOUS ONCE
Status: COMPLETED | OUTPATIENT
Start: 2018-04-23 | End: 2018-04-23

## 2018-04-23 RX ORDER — AMOXICILLIN 250 MG
2 CAPSULE ORAL DAILY
Status: DISCONTINUED | OUTPATIENT
Start: 2018-04-24 | End: 2018-04-25 | Stop reason: HOSPADM

## 2018-04-23 RX ORDER — EPHEDRINE SULFATE 50 MG/ML
INJECTION, SOLUTION INTRAVENOUS AS NEEDED
Status: DISCONTINUED | OUTPATIENT
Start: 2018-04-23 | End: 2018-04-23 | Stop reason: HOSPADM

## 2018-04-23 RX ORDER — FLUMAZENIL 0.1 MG/ML
0.2 INJECTION INTRAVENOUS
Status: DISCONTINUED | OUTPATIENT
Start: 2018-04-23 | End: 2018-04-23 | Stop reason: HOSPADM

## 2018-04-23 RX ORDER — METOPROLOL SUCCINATE 25 MG/1
25 TABLET, EXTENDED RELEASE ORAL DAILY
Status: DISCONTINUED | OUTPATIENT
Start: 2018-04-24 | End: 2018-04-25 | Stop reason: HOSPADM

## 2018-04-23 RX ORDER — ASPIRIN 81 MG/1
81 TABLET ORAL EVERY 12 HOURS
Status: DISCONTINUED | OUTPATIENT
Start: 2018-04-23 | End: 2018-04-25 | Stop reason: HOSPADM

## 2018-04-23 RX ORDER — FLUTICASONE PROPIONATE 50 MCG
2 SPRAY, SUSPENSION (ML) NASAL
Status: DISCONTINUED | OUTPATIENT
Start: 2018-04-23 | End: 2018-04-25 | Stop reason: HOSPADM

## 2018-04-23 RX ORDER — CELECOXIB 200 MG/1
200 CAPSULE ORAL EVERY 12 HOURS
Status: DISCONTINUED | OUTPATIENT
Start: 2018-04-23 | End: 2018-04-25 | Stop reason: HOSPADM

## 2018-04-23 RX ORDER — DIPHENHYDRAMINE HCL 25 MG
25 CAPSULE ORAL
Status: DISCONTINUED | OUTPATIENT
Start: 2018-04-23 | End: 2018-04-25 | Stop reason: HOSPADM

## 2018-04-23 RX ORDER — ACETAMINOPHEN 500 MG
1000 TABLET ORAL EVERY 6 HOURS
Status: DISCONTINUED | OUTPATIENT
Start: 2018-04-24 | End: 2018-04-25 | Stop reason: HOSPADM

## 2018-04-23 RX ORDER — NALOXONE HYDROCHLORIDE 0.4 MG/ML
0.1 INJECTION, SOLUTION INTRAMUSCULAR; INTRAVENOUS; SUBCUTANEOUS
Status: DISCONTINUED | OUTPATIENT
Start: 2018-04-23 | End: 2018-04-23 | Stop reason: HOSPADM

## 2018-04-23 RX ORDER — OXYCODONE HYDROCHLORIDE 5 MG/1
5 TABLET ORAL
Status: DISCONTINUED | OUTPATIENT
Start: 2018-04-23 | End: 2018-04-23 | Stop reason: HOSPADM

## 2018-04-23 RX ORDER — CEFAZOLIN SODIUM/WATER 2 G/20 ML
2 SYRINGE (ML) INTRAVENOUS EVERY 8 HOURS
Status: COMPLETED | OUTPATIENT
Start: 2018-04-23 | End: 2018-04-24

## 2018-04-23 RX ORDER — NEOMYCIN AND POLYMYXIN B SULFATES 40; 200000 MG/ML; [USP'U]/ML
SOLUTION IRRIGATION AS NEEDED
Status: DISCONTINUED | OUTPATIENT
Start: 2018-04-23 | End: 2018-04-23 | Stop reason: HOSPADM

## 2018-04-23 RX ORDER — HYDROMORPHONE HYDROCHLORIDE 2 MG/1
2 TABLET ORAL
Qty: 40 TAB | Refills: 0 | Status: SHIPPED | OUTPATIENT
Start: 2018-04-23 | End: 2018-07-27

## 2018-04-23 RX ORDER — SODIUM CHLORIDE 0.9 % (FLUSH) 0.9 %
5-10 SYRINGE (ML) INJECTION EVERY 8 HOURS
Status: DISCONTINUED | OUTPATIENT
Start: 2018-04-23 | End: 2018-04-23 | Stop reason: HOSPADM

## 2018-04-23 RX ORDER — SODIUM CHLORIDE 9 MG/ML
100 INJECTION, SOLUTION INTRAVENOUS CONTINUOUS
Status: DISPENSED | OUTPATIENT
Start: 2018-04-23 | End: 2018-04-25

## 2018-04-23 RX ORDER — SODIUM CHLORIDE 0.9 % (FLUSH) 0.9 %
5-10 SYRINGE (ML) INJECTION EVERY 8 HOURS
Status: DISCONTINUED | OUTPATIENT
Start: 2018-04-23 | End: 2018-04-25 | Stop reason: HOSPADM

## 2018-04-23 RX ORDER — PROPOFOL 10 MG/ML
INJECTION, EMULSION INTRAVENOUS
Status: DISCONTINUED | OUTPATIENT
Start: 2018-04-23 | End: 2018-04-23 | Stop reason: HOSPADM

## 2018-04-23 RX ORDER — BUPIVACAINE HYDROCHLORIDE 7.5 MG/ML
INJECTION, SOLUTION INTRASPINAL AS NEEDED
Status: DISCONTINUED | OUTPATIENT
Start: 2018-04-23 | End: 2018-04-23 | Stop reason: HOSPADM

## 2018-04-23 RX ORDER — ONDANSETRON 2 MG/ML
INJECTION INTRAMUSCULAR; INTRAVENOUS AS NEEDED
Status: DISCONTINUED | OUTPATIENT
Start: 2018-04-23 | End: 2018-04-23 | Stop reason: HOSPADM

## 2018-04-23 RX ORDER — LIDOCAINE HYDROCHLORIDE 10 MG/ML
0.1 INJECTION INFILTRATION; PERINEURAL AS NEEDED
Status: DISCONTINUED | OUTPATIENT
Start: 2018-04-23 | End: 2018-04-23 | Stop reason: HOSPADM

## 2018-04-23 RX ORDER — KETOROLAC TROMETHAMINE 30 MG/ML
INJECTION, SOLUTION INTRAMUSCULAR; INTRAVENOUS AS NEEDED
Status: DISCONTINUED | OUTPATIENT
Start: 2018-04-23 | End: 2018-04-23 | Stop reason: HOSPADM

## 2018-04-23 RX ORDER — FENTANYL CITRATE 50 UG/ML
100 INJECTION, SOLUTION INTRAMUSCULAR; INTRAVENOUS ONCE
Status: COMPLETED | OUTPATIENT
Start: 2018-04-23 | End: 2018-04-23

## 2018-04-23 RX ORDER — DEXAMETHASONE SODIUM PHOSPHATE 100 MG/10ML
10 INJECTION INTRAMUSCULAR; INTRAVENOUS ONCE
Status: COMPLETED | OUTPATIENT
Start: 2018-04-24 | End: 2018-04-24

## 2018-04-23 RX ORDER — FENTANYL CITRATE 50 UG/ML
INJECTION, SOLUTION INTRAMUSCULAR; INTRAVENOUS AS NEEDED
Status: DISCONTINUED | OUTPATIENT
Start: 2018-04-23 | End: 2018-04-23 | Stop reason: HOSPADM

## 2018-04-23 RX ORDER — ALBUTEROL SULFATE 90 UG/1
2 AEROSOL, METERED RESPIRATORY (INHALATION)
Status: DISCONTINUED | OUTPATIENT
Start: 2018-04-23 | End: 2018-04-25 | Stop reason: HOSPADM

## 2018-04-23 RX ORDER — ALBUTEROL SULFATE 0.83 MG/ML
2.5 SOLUTION RESPIRATORY (INHALATION)
Status: DISCONTINUED | OUTPATIENT
Start: 2018-04-23 | End: 2018-04-25 | Stop reason: HOSPADM

## 2018-04-23 RX ORDER — ROPIVACAINE HYDROCHLORIDE 5 MG/ML
INJECTION, SOLUTION EPIDURAL; INFILTRATION; PERINEURAL AS NEEDED
Status: DISCONTINUED | OUTPATIENT
Start: 2018-04-23 | End: 2018-04-23 | Stop reason: HOSPADM

## 2018-04-23 RX ORDER — HYDROMORPHONE HYDROCHLORIDE 2 MG/1
2 TABLET ORAL
Status: DISCONTINUED | OUTPATIENT
Start: 2018-04-23 | End: 2018-04-25 | Stop reason: HOSPADM

## 2018-04-23 RX ORDER — NALBUPHINE HYDROCHLORIDE 10 MG/ML
5 INJECTION, SOLUTION INTRAMUSCULAR; INTRAVENOUS; SUBCUTANEOUS
Status: DISCONTINUED | OUTPATIENT
Start: 2018-04-23 | End: 2018-04-23 | Stop reason: HOSPADM

## 2018-04-23 RX ORDER — LOSARTAN POTASSIUM 50 MG/1
100 TABLET ORAL DAILY
Status: DISCONTINUED | OUTPATIENT
Start: 2018-04-24 | End: 2018-04-25 | Stop reason: HOSPADM

## 2018-04-23 RX ORDER — ONDANSETRON 2 MG/ML
4 INJECTION INTRAMUSCULAR; INTRAVENOUS
Status: DISCONTINUED | OUTPATIENT
Start: 2018-04-23 | End: 2018-04-25 | Stop reason: HOSPADM

## 2018-04-23 RX ORDER — TRANEXAMIC ACID 100 MG/ML
INJECTION, SOLUTION INTRAVENOUS AS NEEDED
Status: DISCONTINUED | OUTPATIENT
Start: 2018-04-23 | End: 2018-04-23 | Stop reason: HOSPADM

## 2018-04-23 RX ADMIN — Medication 10 ML: at 19:47

## 2018-04-23 RX ADMIN — SODIUM CHLORIDE 100 ML/HR: 900 INJECTION, SOLUTION INTRAVENOUS at 15:22

## 2018-04-23 RX ADMIN — CELECOXIB 200 MG: 200 CAPSULE ORAL at 19:37

## 2018-04-23 RX ADMIN — EPHEDRINE SULFATE 10 MG: 50 INJECTION, SOLUTION INTRAVENOUS at 10:01

## 2018-04-23 RX ADMIN — HYDROMORPHONE HYDROCHLORIDE 2 MG: 2 TABLET ORAL at 15:21

## 2018-04-23 RX ADMIN — ASPIRIN 81 MG: 81 TABLET, COATED ORAL at 19:37

## 2018-04-23 RX ADMIN — ONDANSETRON 4 MG: 2 INJECTION INTRAMUSCULAR; INTRAVENOUS at 11:06

## 2018-04-23 RX ADMIN — ROPIVACAINE HYDROCHLORIDE 20 ML: 5 INJECTION, SOLUTION EPIDURAL; INFILTRATION; PERINEURAL at 09:24

## 2018-04-23 RX ADMIN — Medication 5 ML: at 14:00

## 2018-04-23 RX ADMIN — SODIUM CHLORIDE, SODIUM LACTATE, POTASSIUM CHLORIDE, AND CALCIUM CHLORIDE 100 ML/HR: 600; 310; 30; 20 INJECTION, SOLUTION INTRAVENOUS at 07:47

## 2018-04-23 RX ADMIN — CELECOXIB 200 MG: 200 CAPSULE ORAL at 07:47

## 2018-04-23 RX ADMIN — EPHEDRINE SULFATE 10 MG: 50 INJECTION, SOLUTION INTRAVENOUS at 10:35

## 2018-04-23 RX ADMIN — SODIUM CHLORIDE, SODIUM LACTATE, POTASSIUM CHLORIDE, AND CALCIUM CHLORIDE: 600; 310; 30; 20 INJECTION, SOLUTION INTRAVENOUS at 09:43

## 2018-04-23 RX ADMIN — EPHEDRINE SULFATE 10 MG: 50 INJECTION, SOLUTION INTRAVENOUS at 11:04

## 2018-04-23 RX ADMIN — FENTANYL CITRATE 25 MCG: 50 INJECTION, SOLUTION INTRAMUSCULAR; INTRAVENOUS at 11:22

## 2018-04-23 RX ADMIN — Medication 2 G: at 15:24

## 2018-04-23 RX ADMIN — TRANEXAMIC ACID 1000 MG: 100 INJECTION, SOLUTION INTRAVENOUS at 09:51

## 2018-04-23 RX ADMIN — EPHEDRINE SULFATE 5 MG: 50 INJECTION, SOLUTION INTRAVENOUS at 10:13

## 2018-04-23 RX ADMIN — FENTANYL CITRATE 50 MCG: 50 INJECTION, SOLUTION INTRAMUSCULAR; INTRAVENOUS at 09:56

## 2018-04-23 RX ADMIN — FENTANYL CITRATE 25 MCG: 50 INJECTION, SOLUTION INTRAMUSCULAR; INTRAVENOUS at 10:45

## 2018-04-23 RX ADMIN — ACETAMINOPHEN 1000 MG: 10 INJECTION, SOLUTION INTRAVENOUS at 19:13

## 2018-04-23 RX ADMIN — HYDROMORPHONE HYDROCHLORIDE 1 MG: 2 INJECTION, SOLUTION INTRAMUSCULAR; INTRAVENOUS; SUBCUTANEOUS at 22:47

## 2018-04-23 RX ADMIN — FENTANYL CITRATE 50 MCG: 50 INJECTION, SOLUTION INTRAMUSCULAR; INTRAVENOUS at 10:07

## 2018-04-23 RX ADMIN — HYDROMORPHONE HYDROCHLORIDE 2 MG: 2 TABLET ORAL at 19:37

## 2018-04-23 RX ADMIN — EPHEDRINE SULFATE 5 MG: 50 INJECTION, SOLUTION INTRAVENOUS at 11:14

## 2018-04-23 RX ADMIN — BUPIVACAINE HYDROCHLORIDE 1.8 ML: 7.5 INJECTION, SOLUTION INTRASPINAL at 09:51

## 2018-04-23 RX ADMIN — FENTANYL CITRATE 50 MCG: 50 INJECTION INTRAMUSCULAR; INTRAVENOUS at 09:20

## 2018-04-23 RX ADMIN — Medication 2 G: at 09:43

## 2018-04-23 RX ADMIN — FENTANYL CITRATE 50 MCG: 50 INJECTION, SOLUTION INTRAMUSCULAR; INTRAVENOUS at 09:43

## 2018-04-23 RX ADMIN — PROPOFOL 50 MCG/KG/MIN: 10 INJECTION, EMULSION INTRAVENOUS at 09:45

## 2018-04-23 RX ADMIN — LIDOCAINE HYDROCHLORIDE 0.1 ML: 10 INJECTION, SOLUTION INFILTRATION; PERINEURAL at 07:48

## 2018-04-23 RX ADMIN — ONDANSETRON 4 MG: 2 INJECTION INTRAMUSCULAR; INTRAVENOUS at 22:48

## 2018-04-23 NOTE — ANESTHESIA PROCEDURE NOTES
Spinal Block    Start time: 4/23/2018 9:49 AM  End time: 4/23/2018 9:51 AM  Performed by: Keisha Wolff  Authorized by: Keisha Wolff     Pre-procedure:   Indications: primary anesthetic  Preanesthetic Checklist: patient identified, risks and benefits discussed, anesthesia consent, patient being monitored and timeout performed    Timeout Time: 09:48          Spinal Block:   Patient Position:  Seated  Prep Region:  Lumbar  Prep: chlorhexidine and patient draped      Location:  L3-4  Technique:  Single shot  Local:  Lidocaine 1%  Local Dose (mL):  2    Needle:   Needle Type:  Pencan  Needle Gauge:  25 G  Attempts:  1      Events: CSF confirmed, no blood with aspiration and no paresthesia        Assessment:  Insertion:  Uncomplicated  Patient tolerance:  Patient tolerated the procedure well with no immediate complications

## 2018-04-23 NOTE — ANESTHESIA POSTPROCEDURE EVALUATION
Post-Anesthesia Evaluation and Assessment    Patient: Usha Burgos MRN: [id number]  SSN: [id number]    YOB: 1940  Age: 77 y.o. Sex: female       Cardiovascular Function/Vital Signs  Visit Vitals    /65    Pulse 77    Temp 37.1 °C (98.8 °F)    Resp 16    SpO2 98%       Patient is status post spinal anesthesia for Procedure(s):  KNEE ARTHROPLASTY TOTAL LEFT/ SUE/ FNB. Nausea/Vomiting: None    Postoperative hydration reviewed and adequate. Pain:  Pain Scale 1: Visual (04/23/18 1215)  Pain Intensity 1: 0 (04/23/18 1215)   Managed    Neurological Status:   Neuro (WDL): Exceptions to WDL (04/23/18 1215)  Neuro  Neurologic State: Alert (04/23/18 1215)  Orientation Level: Oriented X4 (04/23/18 1215)  LLE Motor Response: Pharmacologically paralyzed (04/23/18 1215)  RLE Motor Response: Pharmacologically paralyzed (04/23/18 1215)   At baseline    Mental Status and Level of Consciousness: Arousable    Pulmonary Status:   O2 Device: Nasal cannula (04/23/18 1215)   Adequate oxygenation and airway patent    Complications related to anesthesia: None    Post-anesthesia assessment completed.  No concerns    Signed By: Amelia Mirza MD     April 23, 2018

## 2018-04-23 NOTE — PERIOP NOTES
TRANSFER - OUT REPORT:    Verbal report given to Rashid HESS on [employer]  being transferred to preop [facility] for routine progression of care       Report consisted of patients Situation, Background, Assessment and   Recommendations(SBAR). Information from the following report(s) SBAR, MAR and Recent Results was reviewed with the receiving nurse. Lines:   Peripheral IV 65/84/75 Left Cephalic (Active)   Site Assessment Clean, dry, & intact 4/23/2018  7:40 AM   Phlebitis Assessment 0 4/23/2018  7:40 AM   Dressing Status Clean, dry, & intact 4/23/2018  7:40 AM   Dressing Type Transparent;Tape 4/23/2018  7:40 AM   Hub Color/Line Status Green; Infusing 4/23/2018  7:40 AM   Action Taken Blood drawn 4/23/2018  7:40 AM        Opportunity for questions and clarification was provided.       Patient transported with:   [employer]

## 2018-04-23 NOTE — ANESTHESIA PROCEDURE NOTES
Peripheral Block    Start time: 4/23/2018 9:21 AM  End time: 4/23/2018 9:24 AM  Performed by: Zeeshan Barrios  Authorized by: Zeeshan Barrios       Pre-procedure: Indications: at surgeon's request and post-op pain management    Preanesthetic Checklist: patient identified, risks and benefits discussed, site marked, timeout performed, anesthesia consent given and patient being monitored    Timeout Time: 09:20          Block Type:   Block Type:   Adductor canal  Laterality:  Left  Monitoring:  Standard ASA monitoring, continuous pulse ox, frequent vital sign checks, heart rate, responsive to questions and oxygen  Injection Technique:  Single shot  Procedures: ultrasound guided    Patient Position: supine  Prep: chlorhexidine    Location:  Mid thigh  Needle Type:  Stimuplex  Needle Gauge:  22 G  Needle Localization:  Ultrasound guidance  Medication Injected:  0.5%  ropivacaine  Volume (mL):  20    Assessment:  Number of attempts:  1  Injection Assessment:  Incremental injection every 5 mL, local visualized surrounding nerve on ultrasound, negative aspiration for CSF, negative aspiration for blood, no paresthesia, no intravascular symptoms and ultrasound image on chart  Patient tolerance:  Patient tolerated the procedure well with no immediate complications

## 2018-04-23 NOTE — PROGRESS NOTES
Problem: Mobility Impaired (Adult and Pediatric)  Goal: *Acute Goals and Plan of Care (Insert Text)  GOALS (1-4 days):  (1.)Ms. Sheryn Boast will move from supine to sit and sit to supine  in bed with STAND BY ASSIST.  (2.)Ms. Sheryn Boast will transfer from bed to chair and chair to bed with STAND BY ASSIST using the least restrictive device. (3.)Ms. Sheryn Boast will ambulate with STAND BY ASSIST for 150 feet with the least restrictive device. (4.)Ms. Sheryn Boast will ambulate up/down 2 steps with left railing with CONTACT GUARD ASSIST with device as needed  (5.)Ms. Sheryn Boast will increase left knee ROM to 5°-80°.  ________________________________________________________________________________________________      PHYSICAL THERAPY Joint camp tKa: Initial Assessment, PM 4/23/2018  INPATIENT: Hospital Day: 1  Payor: [address] [place] [address] / Plan: [address] / Product Type: [employer] Care Medicare /      NAME/AGE/GENDER: Myron Perez is a 77 y.o. female   PRIMARY DIAGNOSIS:  Primary localized osteoarthritis of left knee [M17.12]   Procedure(s) and Anesthesia Type:     * KNEE ARTHROPLASTY TOTAL LEFT/ SUE/ FNB - Spinal (Left)  ICD-10: Treatment Diagnosis:    · Pain in Left Knee (M25.562)  · Stiffness of Left Knee, Not elsewhere classified (M25.662)  · Difficulty in walking, Not elsewhere classified (R26.2)      ASSESSMENT:     Ms. Sheryn Boast presents with limited rom and strength of left LE as well as decreased functional mobility and gait s/p left tka. She plans to go home with HHPT. This section established at most recent assessment   PROBLEM LIST (Impairments causing functional limitations):  1. Decreased Strength  2. Decreased ADL/Functional Activities  3. Decreased Transfer Abilities  4. Decreased Ambulation Ability/Technique  5. Decreased Balance  6. Increased Pain  7. Decreased Activity Tolerance  8. Decreased Flexibility/Joint Mobility  9.  Decreased [place] with Home Exercise Program   INTERVENTIONS PLANNED: (Benefits and precautions of physical therapy have been discussed with the patient.)  1. Bed Mobility  2. Gait Training  3. Home Exercise Program (HEP)  4. Therapeutic Exercise/Strengthening  5. Transfer Training  6. Range of Motion: active/assisted/passive  7. Therapeutic Activities  8. Group Therapy     TREATMENT PLAN: Frequency/Duration: Follow patient BID for duration of hospital stay to address above goals. Rehabilitation Potential For Stated Goals: Good     RECOMMENDED REHABILITATION/EQUIPMENT: (at time of discharge pending progress): Continue Skilled Therapy and Home Health: Physical Therapy. HISTORY:   History of Present Injury/Illness (Reason for Referral):  S/p left tka  Past Medical History/Comorbidities:   Ms. Bebe Yao  has a past medical history of Asthma with COPD ([facility] CAD (coronary artery disease); Environmental and seasonal allergies; H/O echocardiogram (11/14/2016); History of nuclear stress test (12/04/2017); Graves' disease; Hyperlipidemia (11/16/2012); Hypertension; Menopause; Obesity (BMI 30-39.9); Osteoarthritis; Routine health maintenance (11/16/2012); S/P PTCA (percutaneous transluminal coronary angioplasty) (2012); Shortness of breath (05/10/2016); Status post total left knee replacement (4/23/2018); Status post total right knee replacement (2/1/2016); Thyroid disease; Tremor (11/16/2012); and Ulcerative colitis ([facility] (11/7/2013). She also has no past medical history of Adverse effect of anesthesia; Difficult intubation; Malignant hyperthermia due to anesthesia; Nausea & vomiting; Pseudocholinesterase deficiency; or Unspecified adverse effect of anesthesia. Ms. Bebe Yao  has a past surgical history that includes hx lap cholecystectomy (2002); hx hysterectomy (1983); hx heent (1981); hx tonsil and adenoidectomy (1959); hx heent; hx ptca (2012); hx knee replacement (Right, 02/01/2016); hx cataract removal (Left, 08/15/2016); and hx ankle fracture tx (Right, 1956).   Social History/Living Environment:   Patient Expects to be Discharged to[de-identified] Other (comment) (transfer to or)  Prior Level of Function/Work/Activity:  independent   Number of Personal Factors/Comorbidities that affect the Plan of Care: 1-2: MODERATE COMPLEXITY   EXAMINATION:   Most Recent Physical Functioning:      Gross Assessment  AROM: Within functional limits (right LE)  Strength: Generally decreased, functional (right LE)          LLE AROM  L Knee Flexion: 60  L Knee Extension: 15          Bed Mobility  Supine to Sit: Additional time;Minimum assistance  Sit to Supine: Additional time;Minimum assistance    Transfers  Sit to Stand: Additional time;Minimum assistance  Stand to Sit: Additional time;Minimum assistance    Balance  Sitting: Intact  Standing: Pull to stand; With support              Weight Bearing Status  Left Side Weight Bearing: As tolerated  Distance (ft): 4 Feet (ft)  Ambulation - Level of Assistance: Additional time;Minimal assistance  Assistive Device: Walker, rolling  Speed/Lupe: Delayed  Step Length: Left shortened;Right shortened  Stance: Left decreased  Gait Abnormalities: Antalgic  Interventions: Manual cues; Safety awareness training; Tactile cues; Verbal cues     Braces/Orthotics:     Left Knee Cold  Type: Cryocuff      Body Structures Involved:  1. Bones  2. Joints  3. Muscles  4. Ligaments Body Functions Affected:  1. Movement Related Activities and Participation Affected:  1. Mobility   Number of elements that affect the Plan of Care: 3: MODERATE COMPLEXITY   CLINICAL PRESENTATION:   Presentation: Stable and uncomplicated: LOW COMPLEXITY   CLINICAL DECISION MAKIN [facility] [address] [id number] AM-PAC 6 Clicks   Basic Mobility Inpatient Short Form  How much difficulty does the patient currently have. .. Unable A Lot A Little None   1. Turning over in bed (including adjusting bedclothes, sheets and blankets)? [] 1   [] 2   [x] 3   [] 4   2.   Sitting down on and standing up from a chair with arms ( e.g., wheelchair, bedside commode, etc.)   [] 1   [] 2   [x] 3   [] 4   3. Moving from lying on back to sitting on the side of the bed? [] 1   [] 2   [x] 3   [] 4   How much help from another person does the patient currently need. .. Total A Lot A Little None   4. Moving to and from a bed to a chair (including a wheelchair)? [] 1   [] 2   [x] 3   [] 4   5. Need to walk in hospital room? [] 1   [x] 2   [] 3   [] 4   6. Climbing 3-5 steps with a railing? [] 1   [x] 2   [] 3   [] 4   © 2007, Trustees of [address] [place] [address] [address] [id number], under license to [employer]. All rights reserved        Score:  Initial: 16 Most Recent: X (Date: -- )    Interpretation of Tool:  Represents activities that are increasingly more difficult (i.e. Bed mobility, Transfers, Gait). Score 24 23 22-20 19-15 14-10 9-7 6     Modifier CH CI CJ CK CL CM CN      ? Mobility - Walking and Moving Around:     - CURRENT STATUS: CK - 40%-59% impaired, limited or restricted    - GOAL STATUS: CJ - 20%-39% impaired, limited or restricted    - D/C STATUS:  ---------------To be determined---------------  Payor: [employer] MEDICARE COMPLETE / Plan: [address] / Product Type: Managed Care Medicare /      Medical Necessity:     · Patient is expected to demonstrate progress in strength, range of motion and balance to decrease assistance required with theraputic exercises and functional mobility. Reason for Services/Other Comments:  · Patient continues to require present interventions due to patient's inability to perform theraputic exercises and functional mobility independently.    Use of outcome tool(s) and clinical judgement create a POC that gives a: Clear prediction of patient's progress: LOW COMPLEXITY            TREATMENT:   (In addition to Assessment/Re-Assessment sessions the following treatments were rendered)     Pre-treatment Symptoms/Complaints:  none  Pain: Initial:      Post Session:  0     Assessment/Reassessment only, no treatment provided today    Date:   Date:   Date:     ACTIVITY/EXERCISE AM PM AM PM AM PM   GROUP THERAPY  []  []  []  []  []  []   Ankle Pumps         Quad Sets         Gluteal Sets         Hip ABd/ADduction         Straight Leg Raises         Knee Slides         Short Arc Quads         Long Arc Quads         Chair Slides                  B = bilateral; AA = active assistive; A = active; P = passive      Treatment/Session Assessment:     Response to Treatment:  Some left LE numbness. She did fine. Education:  [x] Home Exercises  [x] Fall Precautions  [] Hip Precautions [] D/C Instruction Review  [x] Knee/Hip Prosthesis Review  [x] Walker Management/Safety [] Adaptive Equipment as Needed       Interdisciplinary Collaboration:   o Occupational Therapist  o Registered Nurse    After treatment position/precautions:   o Supine in bed  o Bed/Chair-wheels locked  o Bed in low position  o Caregiver at bedside  o Call light within reach  o Family at bedside    Compliance with Program/Exercises: Will assess as treatment progresses. No questions. Recommendations/Intent for next treatment session:  Treatment next visit will focus on increasing Ms. Burnice Blackwater independence with bed mobility, transfers, gait training, strength/ROM exercises, modalities for pain, and patient education.       Total Treatment Duration:  PT Patient Time In/Time Out  Time In: 1420  Time Out: Jaja Keys 134, PT

## 2018-04-23 NOTE — PERIOP NOTES
TRANSFER - IN REPORT:    Verbal report received from [facility]) on Star Valadez  being received from [facility] [place](unit) for routine progression of care      Report consisted of patients Situation, Background, Assessment and   Recommendations(SBAR). Information from the following report(s) SBAR, Kardex and MAR was reviewed with the receiving nurse. Opportunity for questions and clarification was provided.

## 2018-04-23 NOTE — H&P
The patient has end stage arthritis of the left knee. The patient was see and examined and there are no changes to the patient's orthopedic condition. They have tried conservative treatment for this condition; including antiinflammatories and lifestyle modifications and have failed. The necessity for the joint replacement is still present, and the H&P from the office is still current. The patient will be admitted today for Procedure(s) (LRB):  KNEE ARTHROPLASTY TOTAL LEFT/ SUE/ FNB (Left) .

## 2018-04-23 NOTE — PERIOP NOTES
TRANSFER - OUT REPORT:    Verbal report given to Jomar HESS(name) on Mil Schwab  being transferred to [facility](unit) for routine progression of care       Report consisted of patients Situation, Background, Assessment and   Recommendations(SBAR). Information from the following report(s) SBAR, Kardex, OR Summary, Intake/Output, MAR and Cardiac Rhythm NSR was reviewed with the receiving nurse. Lines:   Peripheral IV 52/56/51 Left Cephalic (Active)   Site Assessment Clean, dry, & intact 4/23/2018 12:17 PM   Phlebitis Assessment 0 4/23/2018 12:17 PM   Infiltration Assessment 0 4/23/2018 12:17 PM   Dressing Status Clean, dry, & intact 4/23/2018 12:17 PM   Dressing Type Transparent;Tape 4/23/2018 12:17 PM   Hub Color/Line Status Green; Infusing 4/23/2018 12:17 PM   Action Taken Blood drawn 4/23/2018  7:40 AM        Opportunity for questions and clarification was provided.       Patient transported with:   O2 @ 2 liters

## 2018-04-23 NOTE — PERIOP NOTES
Betadine lavage:  17.5cc of betadine lot # [id number] , exp. Date  09/2019,  in 500cc of . 9NS Lot # [id number] , exp.  Date : 02/2021

## 2018-04-23 NOTE — PROGRESS NOTES
Initial visit made to patient and a prayer was provided for patient and her family.         [employer]

## 2018-04-23 NOTE — PROGRESS NOTES
TRANSFER - IN REPORT:    Verbal report received from Sriram(name) on Skip Rich  being received from [facility](unit) for routine progression of care      Report consisted of patients Situation, Background, Assessment and   Recommendations(SBAR). Information from the following report(s) SBAR, Kardex, Intake/Output and MAR was reviewed with the receiving nurse. Opportunity for questions and clarification was provided. Assessment completed upon patients arrival to unit and care assumed.

## 2018-04-23 NOTE — PROGRESS NOTES
Patient achieved 1500 Ml/sec on IS. Patient encouraged to do every hour while awake-patient agreed and demonstrated. No shortness of breath or distress noted. BS are clear b/l.    [facility] [place] notes reviewed-

## 2018-04-23 NOTE — PROGRESS NOTES
Assessment completed. Pt alert and oriented X4. BLE are pharmaceutically paralyzed. Pedal pulses are +2 and palpable. No signs of distress noted. Oriented pt to room, unit, dining on call, IS, and pain management. Pt on 1 liter oxygen via nasal cannula. Lungs clear to auscultation. Bowel sounds present. [place] to drainage with no loops in tubing, and stat lock in place. Yellow/straw color urine noted. IV intact with no redness, or swelling noted infusing. Incision to left knee is covered with Aquacel dressing. Ice applied and plexi-pulses on. Pt denies pain. Call light within reach and bed in low position and locked. Pt informed to call out for needs verbalizes understanding. Family at bedside.

## 2018-04-23 NOTE — CONSULTS
Physical Medicine & Rehabilitation Note-consult    Patient: Myron Perez MRN: [id number]  SSN: [id number]    YOB: 1940  Age: 77 y.o. Sex: female      Admit Date: 4/23/2018  Admitting Physician: Carlos Cabrera MD    Medical Decision Making/Plan/Recommend:  Gait impairment and functional deficits. Therapy progressing steadily, without unusual barriers to progress. She is expected to make continued functional gains. Continued rehab at home via [facility] PT would be reasonable and necessary. Continue PT, OT for active/assisted/passive left TKA ROM, strengthening, mobility, transfers, gait training. Chief Complaint : Gait dysfunction secondary to below. Admit Diagnosis: Primary localized osteoarthritis of left knee [M17.12]  left total knee arthroplasty   4/23/2018  Pain  DVT risk  Post op hemorrhagic anemia  Obesity (BMI 30-39. 9)  Status post total right knee replacement  2/1/2016  Osteoarthritis  Acute Rehab Dx:  Gait impairment  Mobility and ambulation deficits  Self Care/ADL deficits    Medical Dx:  Past Medical History:   Diagnosis Date    Asthma with COPD ([facility] [address]     Managed with daily and PRN inhaler     CAD (coronary artery disease)     05/2012: 3.0x 18 Xience MLAD, POBA D1, 11/2017: Normal stress MPI. Followed by [facility] [address] [facility].  Environmental and seasonal allergies     H/O echocardiogram 11/14/2016    EF 71%    History of nuclear stress test 12/04/2017    EF: 70%    Hx of Graves' disease     Hyperlipidemia 11/16/2012    controlled with medication     Hypertension     controlled with medication     Menopause     Obesity (BMI 30-39. 9)     Osteoarthritis     Routine health maintenance 11/16/2012    S/P PTCA (percutaneous transluminal coronary angioplasty) 2012    Patient takes daily 81 mg ASA-Followed by [id number] [facility] [address] [facility]     Shortness of breath 05/10/2016    resolved-patient reports no recent SOB     Status post total left knee replacement 4/23/2018  Status post total right knee replacement 2/1/2016    Thyroid disease     Tremor 11/16/2012    hands     Ulcerative colitis ([address] 11/7/2013     Subjective:     Date of Evaluation:  April 24, 2018    HPI: Usha Burgos is a 77 y.o. female patient at [address] [place] [address] who was admitted on 4/23/2018  by Mihaela Jeffers MD with below mentioned medical history, is being seen for Physical Medicine and Rehabilitation consult. Usha Burgos presented with severe left knee pain due to end stage DJD. The symptoms were not adequately managed with conservative management and she underwent a left total knee arthroplasty per Dr. Mihaela Jeffers MD on 4/23/2018. We are consulted to assist with rehab needs and placement. Patient is to be WBAT LLE. She is making expected gains with ambulation, mobility, and ROM but is still significantly limited by post op knee pain, decreased ROM and strength. Usha Burgos is seen and examined today. Medical Records reviewed. She denies any other debilities. She has been independent with ambulation, prior to admission, limited by left knee pain. Current Level of Function: bed mobility - CGA, transfers -CGA, decreased balance , ambulation -  [de-identified]' with CGA using a RW . Prior Level of Function/Work/Activity:   independent      Family History   Problem Relation Age of Onset    Heart Disease Mother     Arthritis-osteo Father     Hypertension Sister     Diabetes Sister     Hypertension Brother     Hypertension Brother     Hypertension Brother     Cancer Brother     Hypertension Brother     Breast Cancer Paternal Aunt 79      Social History   Substance Use Topics    Smoking status: Former Smoker     Packs/day: 2.00     Years: 10.00     Types: Cigarettes     Quit date: 7/5/1970    Smokeless tobacco: Never Used      Comment: social smoker 1-2 qd    Alcohol use Yes      Comment: occ,.  social     Past Surgical History:   Procedure Laterality Date    HX ANKLE FRACTURE TX Right 1956    HX CATARACT REMOVAL Left 08/15/2016    HX HEENT  1981    thyroid radioactive iodine tx    HX HEENT      Bridge in Mouth    HX HYSTERECTOMY  1983    HX KNEE REPLACEMENT Right 02/01/2016    HX LAP CHOLECYSTECTOMY  2002    HX PTCA  2012    3.0x1.8 Xience mLAD POBA D1    HX TONSIL AND ADENOIDECTOMY  1959      Prior to Admission medications    Medication Sig Start Date End Date Taking? Authorizing Provider   aspirin delayed-release 81 mg tablet Take 1 Tab by mouth every twelve (12) hours every twelve (12) hours for 35 days. 4/23/18 5/28/18 Yes LOAN Toure   HYDROmorphone (DILAUDID) 2 mg tablet Take 1 Tab by mouth every four (4) hours as needed. Max Daily Amount: 12 mg. 4/23/18  Yes LOAN Toure   acetaminophen (TYLENOL EXTRA STRENGTH) 500 mg tablet Take  by mouth every six (6) hours as needed for Pain. Yes Historical Provider   cholecalciferol, vitamin D3, (VITAMIN D3) 2,000 unit tab Take  by mouth daily. Yes Historical Provider   losartan (COZAAR) 100 mg tablet TAKE 1 TABLET BY MOUTH  DAILY FOR HYPERTENSION 3/19/18  Yes Michael Garner MD   levothyroxine (SYNTHROID) 137 mcg tablet Take 137 mcg by mouth Daily (before breakfast). 10/27/17  Yes Michael Garner MD   metoprolol succinate (TOPROL-XL) 25 mg XL tablet Take 1 Tab by mouth daily. 10/27/17  Yes Michael Garner MD   simvastatin (ZOCOR) 40 mg tablet Take 1 Tab by mouth nightly. Patient taking differently: Take 40 mg by mouth daily. 10/27/17  Yes Michael Garner MD   albuterol (PROVENTIL HFA, VENTOLIN HFA, PROAIR HFA) 90 mcg/actuation inhaler Take 2 Puffs by inhalation every four (4) hours as needed for Wheezing. 10/26/17  Yes Shayy Morton NP   fluticasone (FLONASE) 50 mcg/actuation nasal spray 2 Sprays by Both Nostrils route daily. 8/15/17  Yes Kavitha Hernandez MD   aspirin delayed-release 81 mg tablet Take  by mouth daily.    Yes Historical Provider   multivitamin (DAILY MULTIPLE) tablet Take 1 Tab by mouth daily. Hold 7 days prior to surgery per anesthesia protocol   Yes Historical Provider     No Known Allergies     Review of Systems: +left knee pain, +antalgic gait. Denies chest pain, shortness of breath, cough, headache, visual problems, abdominal pain, dysurea, calf pain. Pertinent positives are as noted in the medical records and unremarkable otherwise. Objective:     Vitals:  Blood pressure 125/62, pulse 75, temperature 98.2 °F (36.8 °C), resp. rate 16, SpO2 93 %. Temp (24hrs), Av.1 °F (36.7 °C), Min:97.8 °F (36.6 °C), Max:98.4 °F (36.9 °C)        Intake and Output:   1901 -  0700  In: 3495 [P.O.:720; I.V.:3564]  Out: 8745 [Urine:2675]    Physical Exam:   General: Alert and age appropriately oriented. No acute cardio respiratory distress. HEENT: Normocephalic, no conjunctival pallor. Oral mucosa moist without cyanosis. No JVD. Lungs: Clear to auscultation bilaterally. Respiration even and unlabored   Heart: Regular rate and rhythm, S1, S2   No  Murmurs. Abdomen: Soft, non-tender, non-distended. Genitourinary: defered   Neuromuscular:      Grossly no focal motor deficits. Left knee extension strong  Left ankle dorsiflexion 5/5  Left ankle plantarflexion 5/5  No sensory deficits distally BLE to soft touch. Skin/extremity: Non tender calves BLE. No rashes, no marginal erythema.                                                                                          Labs/Studies:  Recent Results (from the past 72 hour(s))   TYPE & SCREEN    Collection Time: 18  7:45 AM   Result Value Ref Range    Crossmatch Expiration 2018     ABO/Rh(D) Heidi Oscar POSITIVE     Antibody screen NEG    GLUCOSE, POC    Collection Time: 18  7:53 AM   Result Value Ref Range    Glucose (POC) 100 65 - 100 mg/dL   HEMOGLOBIN    Collection Time: 18  7:28 PM   Result Value Ref Range    HGB 12.5 11.7 - 15.4 g/dL   HEMOGLOBIN    Collection Time: 18  4:55 AM   Result Value Ref Range HGB 11.7 11.7 - 37.8 g/dL   METABOLIC PANEL, BASIC    Collection Time: 04/24/18  4:55 AM   Result Value Ref Range    Sodium 140 136 - 145 mmol/L    Potassium 4.7 3.5 - 5.1 mmol/L    Chloride 106 98 - 107 mmol/L    CO2 26 21 - 32 mmol/L    Anion gap 8 7 - 16 mmol/L    Glucose 128 (H) 65 - 100 mg/dL    BUN 15 8 - 23 MG/DL    Creatinine 0.72 0.6 - 1.0 MG/DL    GFR est AA >60 >60 ml/min/1.73m2    GFR est non-AA >60 >60 ml/min/1.73m2    Calcium 7.9 (L) 8.3 - 10.4 MG/DL       Functional Assessment:  Reviewed participation and progress in therapies  Gross Assessment  AROM: Within functional limits (right LE) (04/23/18 1444)  Strength: Generally decreased, functional (right LE) (04/23/18 1444)   Bed Mobility  Supine to Sit: Minimum assistance (04/24/18 1400)  Sit to Supine: Contact guard assistance (04/24/18 0741)   Balance  Sitting: Intact (04/24/18 1400)  Standing: With support (04/24/18 1400)   Grooming  Grooming Assistance: Supervision/set up (04/24/18 0739)  Brushing/Combing Hair: Supervision/set-up (04/24/18 0739)   Feeding  Feeding Assistance: Supervision/set-up (04/24/18 0741)   Toileting  Toileting Assistance: Supervision/set up (04/24/18 0741)   Bed/Mat Mobility  Supine to Sit: Minimum assistance (04/24/18 1400)  Sit to Supine: Contact guard assistance (04/24/18 0741)  Sit to Stand: Contact guard assistance (04/24/18 1400)  Bed to Chair: Contact guard assistance (04/24/18 0741)     Ambulation:  Gait  Speed/Lupe: Delayed (04/24/18 1400)  Step Length: Left shortened;Right shortened (04/24/18 1400)  Stance: Left decreased (04/24/18 1400)  Gait Abnormalities: Antalgic (04/24/18 1400)  Ambulation - Level of Assistance: Contact guard assistance (04/24/18 1400)  Distance (ft): 80 Feet (ft) (04/24/18 1400)  Assistive Device: Walker, rolling (04/24/18 1400)  Interventions: Safety awareness training;Verbal cues (04/24/18 1400)  Duration: 15 Minutes (04/24/18 1400)    Impression/Plan:     Principal Problem:    Status post total left knee replacement (4/23/2018)        Current Facility-Administered Medications   Medication Dose Route Frequency Provider Last Rate Last Dose    albuterol (PROVENTIL VENTOLIN) nebulizer solution 2.5 mg  2.5 mg Nebulization Q6H PRN Alexandra Baker MD        albuterol (PROVENTIL HFA, VENTOLIN HFA, PROAIR HFA) inhaler 2 Puff  2 Puff Inhalation Q4H PRN Colton Chavez, PA        fluticasone [facility]) 50 mcg/actuation nasal spray 2 Spray  2 Spray Both Nostrils QHS [place], [place]        levothyroxine (SYNTHROID) tablet 137 mcg  137 mcg Oral ACB [place], [place]   137 mcg at 04/24/18 7333    losartan (COZAAR) tablet 100 mg  100 mg Oral DAILY [place], [place]   100 mg at 04/24/18 0900    metoprolol succinate (TOPROL-XL) XL tablet 25 mg  25 mg Oral DAILY [place], [place]   25 mg at 04/24/18 0900    0.9% sodium chloride infusion  100 mL/hr IntraVENous CONTINUOUS [place],  mL/hr at 04/23/18 1522 100 mL/hr at 04/23/18 1522    sodium chloride (NS) flush 5-10 mL  5-10 mL IntraVENous Q8H [place], [place]   10 mL at 04/23/18 1947    sodium chloride (NS) flush 5-10 mL  5-10 mL IntraVENous PRN [place], [place]        acetaminophen (TYLENOL) tablet 1,000 mg  1,000 mg Oral Q6H [place], [place]   1,000 mg at 04/24/18 1408    celecoxib (CELEBREX) capsule 200 mg  200 mg Oral Q12H [place], [place]   200 mg at 04/24/18 0930    HYDROmorphone (DILAUDID) tablet 2 mg  2 mg Oral Q4H PRN [place], [place]   2 mg at 04/24/18 1408    HYDROmorphone (PF) (DILAUDID) injection 1 mg  1 mg IntraVENous Q3H PRN [place], [place]        naloxone [facility]) injection 0.2-0.4 mg  0.2-0.4 mg IntraVENous Q10MIN PRN [place], [place]        dexamethasone (DECADRON) injection 10 mg  10 mg IntraVENous ONCE Janelle Brothers        ondansetron [facility]) injection 4 mg  4 mg IntraVENous Q4H PRN LOAN Brothers   4 mg at 04/24/18 0452    diphenhydrAMINE (BENADRYL) capsule 25 mg  25 mg Oral Q4H PRN LOAN Ramirez        senna-docusate (PERICOLACE) 8.6-50 mg per tablet 2 Tab  2 Tab Oral DAILY Janelle Ramirez   2 Tab at 04/24/18 0930    aspirin delayed-release tablet 81 mg  81 mg Oral Q12H LOAN Ramirez   81 mg at 04/24/18 0930        Recommendations: HHPT planned. Continue Acute Rehab Program. PT, OT  to focus on  gait training, transfer training, balance activities, ROM and strengthening exercises. Coordination of rehab/medical care. Counseling of Physical Medicine & Rehab care issues management. Monitoring and management of rehab conditions per the plan of care/orders. Rehabilitation Management/ Medical Management: 1. Devices:Walkers, Type: Rolling Walker  2. Consult:Rehab team including PT, OT,  and . 3. Disposition Rehab-discussed with patient. 4. Thigh-high or knee-high MARY's when out of bed. 5. DVT Prophylaxis - continue aspirin 81mg bid x 35days. 6. Incentive spirometer Q1H while awake  7. Post op hemorrhagic anemia-   monitor. 8. Activity: WBAT LLE  9. Planned Labs: CBC,BMP  10. Pain Control:   Continue scheduled tylenol, Celebrex and  PRN meds. 11. Wound Care: Remove Aquacel 1 week post op ad replace with new one. Remove staples 12-14 post surgery, when incision appears appropriately closed and apply benzoin and 1/2\" steristrips. .   Follow up with ORTHO per instructions. Thank you for the opportunity to participate in the care of this patient.     Signed By: Cynthia Lopez MD     April 24, 2018

## 2018-04-23 NOTE — OP NOTES
[address] [facility]  Total Knee Arthroplasty: Posterior Cruciate Retaining   Patient:Heidi Grajeda   : 1940  Medical Record Number:[id number]  Pre-operative Diagnosis:  Primary localized osteoarthritis of left knee [M17.12]  Post-operative Diagnosis: Status post total left knee replacement  Location: [address] [place] [address]  Surgeon: Martin Wright MD   Assistant: Kavitha Connor PA-C    Anesthesia: Spinal and FNB    Procedure:Procedure(s) (LRB):  KNEE ARTHROPLASTY TOTAL LEFT/ SUE/ FNB (Left)   The complexity of the total joint surgery requires the use of a first assistant for positioning, retraction and expertise in closure. Tourniquet Time: 0 minutes  EBL: 250 cc  Findings: severe degenerative arthritis, patellar osteophytes, posterior femoral osteophytes   BMI: There is no height or weight on file to calculate BMI. Marlena Yoder was brought to the operating room and positioned on the operating table. She was anesthestized with anesthesia. A soto catheter was placed preoperatively and IV antibiotics was administered. Prior to the incision being made a timeout was called identifying the patient, procedure ,operative side and surgeon The operative leg was prepped and draped in the usual sterile manner. An anterior longitudinal incision was accomplished just medial to the tibial tubercle and extending approximal 6 centimeters proximal to the superior pole of the patella. A medial parapatellar capsular incision was performed. The medial capsular flap was elevated around to the insertion of the semimembranous tendon. The patella was everted and the knee flexed and externally rotated. The medial and external menisci were excised. The lateral half of the fat pad excised and the patella femoral ligament was released. The anterior cruciate ligament was resect and the posterior cruciate ligament was retained.   Using extramedullary instrumentation, the tibial cut was accomplished with appropriate posterior slope. The distal femur was addressed. A drill hole was made above the intracondylar notch. Using appropriate intramedullary instrumentation,a five degree valgus distal cut was accomplished. The femur was sized. The anterior and posterior cuts were then made about the distal femur. The osteophytes were removed from the tibial and femoral surfaces. The flexion and extension gaps were assessed with the appropriate spacer blocks. Additional surgical procedures included: none. The flexion and extension gaps were deemed appropriately balanced. The appropriate cutting blocks were then utilized to perform the anterior, posterior and chamfer cuts, with appropriate lateral translation accomplished for the patellofemoral groove. Approximately 9 mm of bone was removed from the high side of the tibia. The tibia was sized. The tibial base plate was pinned into place with the appropriate external rotation and stem site prepared. A preliminary range of motion was accomplished with the trial components. Tthe patient was found to obtain full extension as well as appropriate flexion. The patient's ligaments were stable in flexion and extension to medial and lateral stressing and the alignment was through the appropriate mechanical axis. The patella was then everted. The bone was resect to accommodate the three peg patellar button. A trial reduction revealed appropriate tracking through the patellofemoral groove with no lateral retinacular release being accomplished. All trial components were removed. The real implants were opened: Sizes listed below. The knee was irrigated. There were no femoral deficiencies. There were no tibial deficiencies. No augmentation was utilized. Two packages of cement were mixed and the permanent Tibial and Femoral components were cemented into place. The patella component was then  cemented in place.     [facility] knee was placed through range of motion and noted to be stable as mentioned above with the trail components. The wound was dry, therefore no drain was used. The operative knee was injected with 60 cc of Naropin, 10 cc's of morphine and 1 cc of 30 mg of Toradol. The knee was then soaked with a diluted betadine solution for approximately 3 min. This was then thoroughly irrigated. The capsular layer was closed using a #1 PDS suture. Then, 1 gram (100 mg/ml) of Transexamic Acid was injected into the joint space. The subcutaneous layers were closed using 2-0 Stratafix. Finally the skin was closed using 3-0 Vicryl and skin staples, which were applied in occlusive fashion and sterile bandage applied. An Iceman cryo pad was applied on the operative leg. Sponge count and needle counts were correct. Avie Osler left the operating room     Implants:   Implant Name Type Inv.  Item Serial No.  Lot No. LRB No. Used   CEMENT BNE HV R [id number] -- PALACOS R [id number] - [id number]  CEMENT BNE HV R 40GM -- PALACOS R [id number] [facility] [id number] Left 2   FEM KNE JUS CR SZ 5 LT -- TRIATHLON - [id number]  FEM KNE JUS CR SZ 5 LT -- TRIATHLON [id number] SUE ORTHOPEDICS HOW [id number] Left 1   COMPNT PAT ASYM TRIATHLN 35X10 --  - [id number]  COMPNT PAT ASYM TRIATHLN 35X10 --  [id number] SUE ORTHOPEDICS HOW [id number] Left 1   BASEPLT TIB UNIV TRIATHLN 5 --  - [id number]  BASEPLT TIB UNIV TRIATHLN 5 --  [id number] SUE ORTHOPEDICS HOW [id number] Left 1   INSERT TIB ARTC BEAR SZ5 11MM --  - [id number]  INSERT TIB ARTC BEAR SZ5 11MM --  [id number] SUE ORTHOPEDICS HOW [id number] Left 1   INSERT TIB ARTC BEAR SZ5 13MM --  - [id number]   INSERT TIB ARTC BEAR SZ5 13MM --  [id number] SUE ORTHOPEDICS HOW [id number] Left 1         Signed By: Kan Gonsalez MD   4/23/2018,  11:29 AM

## 2018-04-23 NOTE — PROGRESS NOTES
Problem: Self Care Deficits Care Plan (Adult)  Goal: *Acute Goals and Plan of Care (Insert Text)  GOALS:   DISCHARGE GOALS (in preparation for going home/rehab):  3 days  1. Ms. Tatyana Edmonds will perform one lower body dressing activity with minimal assistance required to demonstrate improved functional mobility and safety. 2.  Ms. Tatyana Edmonds will perform one lower body bathing activity with minimal assistance required to demonstrate improved functional mobility and safety. 3.  Ms. Tatyana Edmonds will perform toileting/toilet transfer with contact guard assistance to demonstrate improved functional mobility and safety. 4.  Ms. Tatyana Edmonds will perform shower transfer with contact guard assistance to demonstrate improved functional mobility and safety. JOINT CAMP OCCUPATIONAL THERAPY TKA: Initial Assessment and PM 4/23/2018  INPATIENT: Hospital Day: 1  Payor: [address] [place] [address] / Plan: [address] / Product Type: Managed Care Medicare /      NAME/AGE/GENDER: María Sinha is a 77 y.o. female   PRIMARY DIAGNOSIS:  Primary localized osteoarthritis of left knee [M17.12]   Procedure(s) and Anesthesia Type:     * KNEE ARTHROPLASTY TOTAL LEFT/ SUE/ FNB - Spinal (Left)  ICD-10: Treatment Diagnosis:    · Pain in Left Knee (M25.562)  · Stiffness of Left Knee, Not elsewhere classified (M25.662)  · Generalized Muscle Weakness (M62.81)  · Other lack of cordination (R27.8)      ASSESSMENT:     Ms. Tatyana Edmonds is s/p left TKA and presents with decreased weight bearing on left LE and decreased independence with functional mobility and activities of daily living as compared to baseline level of function and safety. Patient would benefit from skilled Occupational Therapy to maximize independence and safety with self-care task and functional mobility.   Pt would also benefit from education on adaptive equipment and safety precautions in preparation for going home or for recommendations for post-hospital rehab program.  Patient plans for further rehab at home with home health services and good family support . OT reviewed therapy schedule and plan of care with patient. Patient was able to transfer and preform self care skills as charted below. Patient instructed to call for assistance when needing to get up from the bed and all needs in reach. Patient verbalized understanding of call light. This section established at most recent assessment   PROBLEM LIST (Impairments causing functional limitations):  1. Decreased Strength  2. Decreased ADL/Functional Activities  3. Decreased Transfer Abilities  4. Increased Pain  5. Increased Fatigue  6. Decreased Flexibility/Joint Mobility  7. Decreased Knowledge of Precautions   INTERVENTIONS PLANNED: (Benefits and precautions of occupational therapy have been discussed with the patient.)  1. Activities of daily living training  2. Adaptive equipment training  3. Balance training  4. Clothing management  5. Donning&doffing training  6. Theraputic activity     TREATMENT PLAN: Frequency/Duration: Follow patient 1 time to address above goals. Rehabilitation Potential For Stated Goals: Excellent     RECOMMENDED REHABILITATION/EQUIPMENT: (at time of discharge pending progress): Continue Skilled Therapy and Home Health: Physical Therapy. OCCUPATIONAL PROFILE AND HISTORY:   History of Present Injury/Illness (Reason for Referral): Pt presents this date s/p (left) TKA. Past Medical History/Comorbidities:   Ms. Elisa Novak  has a past medical history of Asthma with COPD ([facility] [address] CAD (coronary artery disease); Environmental and seasonal allergies; H/O echocardiogram (11/14/2016); History of nuclear stress test (12/04/2017); Graves' disease; Hyperlipidemia (11/16/2012); Hypertension; Menopause; Obesity (BMI 30-39.9); Osteoarthritis; Routine health maintenance (11/16/2012); S/P PTCA (percutaneous transluminal coronary angioplasty) (2012); Shortness of breath (05/10/2016);  Status post total left knee replacement (4/23/2018); Status post total right knee replacement (2/1/2016); Thyroid disease; Tremor (11/16/2012); and [facility]ative [facility] ([facility] (11/7/2013). She also has no past medical history of Adverse effect of anesthesia; Difficult intubation; Malignant hyperthermia due to anesthesia; Nausea & vomiting; Pseudocholinesterase deficiency; or Unspecified adverse effect of anesthesia. Ms. Saud Pabon  has a past surgical history that includes hx lap cholecystectomy (2002); hx hysterectomy (1983); hx heent (1981); hx tonsil and adenoidectomy (1959); hx heent; hx ptca (2012); hx knee replacement (Right, 02/01/2016); hx cataract removal (Left, 08/15/2016); and hx ankle fracture tx (Right, 1956). Social History/Living Environment:   Home Environment: Private residence  # Steps to Enter: 1  Rails to Enter: Yes  One/Two Story Residence: One story  Living Alone: No  Support Systems: Spouse/Significant Other/Partner  Patient Expects to be Discharged to[de-identified] Private residence  Current DME Used/Available at Home: Onita Bonds, rollator, Walker, rolling, [address], [place], straight, Commode, bedside, Shower chair  Tub or Shower Type: Shower  Prior Level of Function/Work/Activity:  Indep with self care and functional mobility     Number of Personal Factors/Comorbidities that affect the Plan of Care: Brief history (0):  LOW COMPLEXITY   ASSESSMENT OF OCCUPATIONAL PERFORMANCE[de-identified]   Most Recent Physical Functioning:   Balance  Sitting: Intact  Standing: Pull to stand; With support       Gross Assessment  AROM: Within functional limits (right LE)  Strength: Generally decreased, functional (right LE)          LLE AROM  L Knee Flexion: 60  L Knee Extension: 15 Coordination  Fine Motor Skills-Upper: Left Intact; Right Intact  Gross Motor Skills-Upper: Left Intact; Right Intact         Mental Status  Neurologic State: Alert  Orientation Level: Oriented X4  Cognition: Appropriate decision making; Appropriate for age attention/concentration; Appropriate safety awareness; Follows commands  Perception: Appears intact  Perseveration: No perseveration noted  Safety/Judgement: Awareness of environment; Fall prevention                Basic ADLs (From Assessment) Complex ADLs (From Assessment)   Basic ADL  Feeding: Supervision  Oral Facial Hygiene/Grooming: Supervision  Bathing: Moderate assistance  Upper Body Dressing: Minimum assistance  Lower Body Dressing: Moderate assistance  Toileting: Total assistance     Grooming/Bathing/Dressing Activities of Daily Living     Cognitive Retraining  Safety/Judgement: Awareness of environment; Fall prevention                 Functional Transfers  Toilet Transfer : Minimum assistance  Shower Transfer: Minimum assistance     Bed/Mat Mobility  Supine to Sit: Additional time;Minimum assistance  Sit to Supine: Additional time;Minimum assistance  Sit to Stand: Additional time;Minimum assistance         Physical Skills Involved:  1. Range of Motion  2. Balance  3. Strength Cognitive Skills Affected (resulting in the inability to perform in a timely and safe manner):  1. None Psychosocial Skills Affected:  1. None   Number of elements that affect the Plan of Care: 1-3:  LOW COMPLEXITY   CLINICAL DECISION MAKIN[address] [place] [id number] AM-PAC 6 Clicks   Daily Activity Inpatient Short Form  How much help from another person does the patient currently need. .. Total A Lot A Little None   1. Putting on and taking off regular lower body clothing? [] 1   [x] 2   [] 3   [] 4   2. Bathing (including washing, rinsing, drying)? [] 1   [x] 2   [] 3   [] 4   3. Toileting, which includes using toilet, bedpan or urinal?   [] 1   [x] 2   [] 3   [] 4   4. Putting on and taking off regular upper body clothing? [] 1   [] 2   [x] 3   [] 4   5. Taking care of personal grooming such as brushing teeth? [] 1   [] 2   [x] 3   [] 4   6. Eating meals? [] 1   [] 2   [x] 3   [] 4   © , Trustees of [address] [place] [address] [address] [address], under license to [employer].  All rights reserved     Score:  Initial: 15 Most Recent: X (Date: -- )    Interpretation of Tool:  Represents activities that are increasingly more difficult (i.e. Bed mobility, Transfers, Gait). Score 24 23 22-20 19-15 14-10 9-7 6     Modifier CH CI CJ CK CL CM CN      ? Self Care:     - CURRENT STATUS: CK - 40%-59% impaired, limited or restricted    - GOAL STATUS: CJ - 20%-39% impaired, limited or restricted    - D/C STATUS:  ---------------To be determined---------------  Payor: [address] [place] [address] / Plan: [facility] MEDICARE COMPLETE / Product Type: Managed Care Medicare /      Medical Necessity:     · Patient is expected to demonstrate progress in balance, coordination and functional technique to decrease assistance required with self care and functional mobility. Reason for Services/Other Comments:  · Patient continues to require skilled intervention due to decreased self care and functional mobility. Use of outcome tool(s) and clinical judgement create a POC that gives a: LOW COMPLEXITY            TREATMENT:   (In addition to Assessment/Re-Assessment sessions the following treatments were rendered)     Pre-treatment Symptoms/Complaints:  None  Pain: Initial:   Pain Intensity 1: 0  Post Session:  0     Assessment/Reassessment only, no treatment provided today    Treatment/Session Assessment:     Response to Treatment:  Tolerated well.     Education:  [] Home Exercises  [x] Fall Precautions  [] Hip Precautions [] Going Home Video  [x] Knee/Hip Prosthesis Review  [x] Walker Management/Safety [x] Adaptive Equipment as Needed       Interdisciplinary Collaboration:   o Physical Therapist  o Occupational Therapist  o Registered Nurse    After treatment position/precautions:   o Supine in bed  o Bed alarm/tab alert on  o Bed/Chair-wheels locked  o Bed in low position  o Caregiver at bedside  o Call light within reach  o RN notified  o Family at bedside  o Side rails x 3     Compliance with Program/Exercises: compliant all of the time. Recommendations/Intent for next treatment session:  Treatment next visit will focus on increasing Ms. Cumberland Cord independence with bed mobility, transfers, self care, functional mobility, modalities for pain, and patient education.       Total Treatment Duration:  OT Patient Time In/Time Out  Time In: 1430  Time Out: [address]

## 2018-04-23 NOTE — ANESTHESIA PREPROCEDURE EVALUATION
Anesthetic History   No history of anesthetic complications            Review of Systems / Medical History  Patient summary reviewed and pertinent labs reviewed    Pulmonary    COPD        Asthma : well controlled       Neuro/Psych              Cardiovascular    Hypertension: well controlled          CAD (last stent 2012), cardiac stents and hyperlipidemia      Comments: Stress Test 12/2017 showing normal perfusion and EKG and EF 70%.    GI/Hepatic/Renal               Comments: [facility] End[facility]/Other      Hypothyroidism  Obesity and arthritis (OA)     Other Findings   Comments: ULCERATIVE COLITIS  TREMOR  HX GRAVES           Physical Exam    Airway  Mallampati: II  TM Distance: 4 - 6 cm  Neck ROM: normal range of motion        Cardiovascular  Regular rate and rhythm,  S1 and S2 normal,  no murmur, click, rub, or gallop  Rhythm: regular  Rate: normal         Dental  No notable dental hx       Pulmonary  Breath sounds clear to auscultation               Abdominal  GI exam deferred       Other Findings            Anesthetic Plan    ASA: 3  Anesthesia type: spinal          Induction: Intravenous  Anesthetic plan and risks discussed with: Patient

## 2018-04-23 NOTE — IP AVS SNAPSHOT
[facility] [address] [facility] 
[phone] Patient: Yoselin Hill MRN: [id number] NFN:[id number] About your hospitalization You were admitted on:  April 23, 2018 You last received care in the:  [facility] 1 You were discharged on:  April 25, 2018 Why you were hospitalized Your primary diagnosis was:  Status Post Total Left Knee Replacement Follow-up Information Follow up With Details Comments Contact Info Peter Kahn MD  As needed [address] ,[address] 
[phone]  35 [facility]  Will contact you within 48 hrs [place] [address] [address] [facility] [address] 
[phone] Dez Michel MD  As scheduled by office [address] [address] 
[phone] Discharge Orders Procedure Order Date Status Priority Quantity Spec Type Associated Dx CALL YOUR DOCTOR For: Temperature greater than 100.4., Severe uncontrolled pain. , Persistant nausea and vomiting., Persistant dizziness or light-headedness. , Hives, Difficulty breathing, headache, or visual disturbances. , Redness, tenderness, or s. .. 04/23/18 1405 Normal Routine 1  Status post total left knee replacement [[id number]] Questions: For:  Temperature greater than 100.4. For:  Severe uncontrolled pain. For:  Persistant nausea and vomiting. For:  Persistant dizziness or light-headedness. For:  Cece Gely For:  Difficulty breathing, headache, or visual disturbances. For:  Redness, tenderness, or signs of infection. ACTIVITY AFTER DISCHARGE Patient should: Restrict driving, Restrict lifting, Other (specify) 04/23/18 1405 Normal Routine 1  Status post total left knee replacement [[id number]] Questions: Patient should:  Restrict driving Patient should:  Restrict lifting Patient should: Other (specify) DRESSING, CHANGE SPECIFY 04/23/18 1405 Normal Routine 1  Status post total left knee replacement [[id number]] Comments:  Routine dressing changes. Notify if excessive drainage. If staples are present they are to be removed 10 days post surgery and steri strips applied. [address] [facility] 04/23/18 1405 Normal Routine 1  Status post total left knee replacement [[id number]] REFERRAL TO PHYSICAL THERAPY 04/23/18 1405 Normal Routine 1  Status post total left knee replacement [[id number]] Comments:  Referral to Home PT A check kendra indicates which time of day the medication should be taken. My Medications START taking these medications Instructions Each Dose to Equal  
 Morning Noon Evening Bedtime HYDROmorphone 2 mg tablet Commonly known as:  DILAUDID Your next dose is: Today Take 1 Tab by mouth every four (4) hours as needed. Max Daily Amount: 12 mg.  
 2 mg CHANGE how you take these medications Instructions Each Dose to Equal  
 Morning Noon Evening Bedtime  
 aspirin delayed-release 81 mg tablet What changed:   
- how much to take - when to take this Your next dose is: Today Take 1 Tab by mouth every twelve (12) hours every twelve (12) hours for 35 days. 81 mg  
    
   
   
  
   
  
 simvastatin 40 mg tablet Commonly known as:  ZOCOR What changed:  when to take this Your next dose is: Today Take 1 Tab by mouth nightly. 40 mg CONTINUE taking these medications Instructions Each Dose to Equal  
 Morning Noon Evening Bedtime  
 albuterol 90 mcg/actuation inhaler Commonly known as:  PROVENTIL HFA, VENTOLIN HFA, PROAIR HFA Your next dose is: Today Take 2 Puffs by inhalation every four (4) hours as needed for Wheezing. 2 Puff DAILY MULTIPLE tablet Generic drug:  multivitamin Your next dose is:  Tomorrow Take 1 Tab by mouth daily. Hold 7 days prior to surgery per anesthesia protocol 1 Tab  
    
  
   
   
   
  
 fluticasone 50 mcg/actuation nasal spray Commonly known as:  Welby Galla Your next dose is:  Tomorrow 2 Sprays by Both Nostrils route daily. 2 Spray  
    
  
   
   
   
  
 levothyroxine 137 mcg tablet Commonly known as:  SYNTHROID Your next dose is:  Tomorrow Take 137 mcg by mouth Daily (before breakfast). 137 mcg  
    
  
   
   
   
  
 losartan 100 mg tablet Commonly known as:  COZAAR Your next dose is:  Tomorrow TAKE 1 TABLET BY MOUTH  DAILY FOR HYPERTENSION  
     
  
   
   
   
  
 metoprolol succinate 25 mg XL tablet Commonly known as:  TOPROL-XL Your next dose is:  Tomorrow Take 1 Tab by mouth daily. 25 mg  
    
  
   
   
   
  
 TYLENOL EXTRA STRENGTH 500 mg tablet Generic drug:  acetaminophen Your next dose is: Today Take  by mouth every six (6) hours as needed for Pain. VITAMIN D3 2,000 unit Tab Generic drug:  cholecalciferol (vitamin D3) Your next dose is:  Tomorrow Take  by mouth daily. Where to Get Your Medications Information on where to get these meds will be given to you by the nurse or doctor. ! Ask your nurse or doctor about these medications  
  aspirin delayed-release 81 mg tablet HYDROmorphone 2 mg tablet Opioid Education Prescription Opioids: What You Need to Know: 
 
Prescription opioids can be used to help relieve moderate-to-severe pain and are often prescribed following a surgery or injury, or for certain health conditions. These medications can be an important part of treatment but also come with serious risks. Opioids are strong pain medicines. Examples include hydrocodone, oxycodone, fentanyl, and morphine. Heroin is an example of an illegal opioid.   It is important to work with your health care provider to make sure you are getting the safest, most effective care. WHAT ARE THE RISKS AND SIDE EFFECTS OF OPIOID USE? Prescription opioids carry serious risks of addiction and overdose, especially with prolonged use. An opioid overdose, often marked by slow breathing, can cause sudden death. The use of prescription opioids can have a number of side effects as well, even when taken as directed. · Tolerance-meaning you might need to take more of a medication for the same pain relief · Physical dependence-meaning you have symptoms of withdrawal when the medication is stopped. Withdrawal symptoms can include nausea, sweating, chills, diarrhea, stomach cramps, and muscle aches. Withdrawal can last up to several weeks, depending on which drug you took and how long you took it. · Increased sensitivity to pain · Constipation · Nausea, vomiting, and dry mouth · Sleepiness and dizziness · Confusion · Depression · Low levels of testosterone that can result in lower sex drive, energy, and strength · Itching and sweating RISKS ARE GREATER WITH:      
· History of drug misuse, substance use disorder, or overdose · Mental health conditions (such as depression or anxiety) · Sleep apnea · Older age (72 years or older) · Pregnancy Avoid alcohol while taking prescription opioids. Also, unless specifically advised by your health care provider, medications to avoid include: · Benzodiazepines (such as Xanax or Valium) · Muscle relaxants (such as Soma or Flexeril) · Hypnotics (such as Ambien or Lunesta) · Other prescription opioids KNOW YOUR OPTIONS Talk to your health care provider about ways to manage your pain that don't involve prescription opioids. Some of these options may actually work better and have fewer risks and side effects. Options may include: 
· Pain relievers such as acetaminophen, ibuprofen, and naproxen · Some medications that are also used for depression or seizures · Physical therapy and exercise · Counseling to help patients learn how to cope better with triggers of pain and stress. · Application of heat or cold compress · Massage therapy · Relaxation techniques Be Informed Make sure you know the name of your medication, how much and how often to take it, and its potential risks & side effects. IF YOU ARE PRESCRIBED OPIOIDS FOR PAIN: 
· Never take opioids in greater amounts or more often than prescribed. Remember the goal is not to be pain-free but to manage your pain at a tolerable level. · Follow up with your primary care provider to: · Work together to create a plan on how to manage your pain. · Talk about ways to help manage your pain that don't involve prescription opioids. · Talk about any and all concerns and side effects. · Help prevent misuse and abuse. · Never sell or share prescription opioids · Help prevent misuse and abuse. · Store prescription opioids in a secure place and out of reach of others (this may include visitors, children, friends, and family). · Safely dispose of unused/unwanted prescription opioids: Find your community drug take-back program or your pharmacy mail-back program, or flush them down the toilet, following guidance from the Food and Drug Administration (www.fda.gov/Drugs/ResourcesForYou). · Visit www.cdc.gov/drugoverdose to learn about the risks of opioid abuse and overdose. · If you believe you may be struggling with addiction, tell your health care provider and ask for guidance or call [address] [place] [address] at [phone]. Discharge Instructions [employer] Patient Discharge Instructions Juan Jose Yu / [id number] : 1940 Admitted 2018 Discharged: 2018 IF YOU HAVE ANY PROBLEMS ONCE YOU ARE AT HOME CALL THE FOLLOWING NUMBERS:  
Main office number: [phone] Medications · The medications you are to continue on are listed on the medication reconciliation sheet. · Narcotic pain medications as well as supplemental iron can cause constipation. If this occurs try stopping the narcotic pain medication and/or the iron. · It is important that you take the medication exactly as they are prescribed. · Medications which increase your risk of blood clots are listed to stop for 5 weeks after surgery as well as medications or supplements which increase your risk of bleeding complications. · Keep your medication in the bottles provided by the pharmacist and keep a list of the medication names, dosages, and times to be taken in your wallet. · Do not take other medications without consulting your doctor. Important Information Do NOT smoke as this will greatly increase your risk of infection! Resume your prehospital diet. If you have excessive nausea or vomitting call your doctor's office Leg swelling and warmth is normal for 6 months after surgery. If you experience swelling in your leg elevate you leg while laying down with your toes above your heart. If you have sudden onset severe swelling with leg pain call our office. The stitches deep inside take approximately 6 months to dissolve. There will be sharp shooting, stinging and burning pain. This is normal and will resolve between 3-6 months after surgery. Difficulty sleeping is normal following total Knee and Hip replacement. You may try melatonin, an over-the-counter sleep aid or benadryl to help with sleep. Most patients will resume sleeping through the night 8 weeks after surgery. Home Physical Therapy is arranged. Home Health will contact you within 48 hrs of discharge that you have chosen.  If you have not received a call within this time frame please contact that provider you chose. You should be given this information before you leave the hospital.  
 
You are at a risk for falls. Use the rolling walker when walking. Patients who have had a joint replacement should not drive if they are still taking narcotic pain mediation during the daytime hours. Most patients wean themselves off of pain medication within 2-5 weeks after surgery. When to Call the office - If you have a temperature greater then 101 
- Uncontrolled vomiting - Loose control of your bladder or bowel function - Are unable to bear any weight  
- Need a pain medication refill DISCHARGE SUMMARY from Nurse The following personal items collected during your admission are returned to you:  
Dental Appliance: Dental Appliances: None Vision: Visual Aid: Glasses Hearing Aid:   na 
Jewelry: Jewelry: Ring, Earrings Clothing:   self Other Valuables: Other Valuables: Cell Phone (credit card and all valuables sent with [employer]) Valuables sent to safe:   na 
 
PATIENT INSTRUCTIONS: 
 
After general anesthesia or intravenous sedation, for 24 hours or while taking prescription Narcotics: · Limit your activities · Do not drive and operate hazardous machinery · Do not make important personal or business decisions · Do  not drink alcoholic beverages · If you have not urinated within 8 hours after discharge, please contact your surgeon on call. Report the following to your surgeon: 
· Excessive pain, swelling, redness or odor of or around the surgical area · Temperature over 101 · Nausea and vomiting lasting longer than 4 hours or if unable to take medications · Any signs of decreased circulation or nerve impairment to extremity: change in color, persistent  numbness, tingling, coldness or increase pain · Any questions, call office @ [phone] Keep scheduled follow up appointment. If need to change, call office @ [phone]. *  Please give a list of your current medications to your Primary Care Provider. *  Please update this list whenever your medications are discontinued, doses are 
    changed, or new medications (including over-the-counter products) are added. *  Please carry medication information at all times in case of emergency situations. Total Knee Replacement: What to Expect at Home Your Recovery When you leave the hospital, you should be able to move around with a walker or crutches. But you will need someone to help you at home for the next few weeks or until you have more energy and can move around better. If there is no one to help you at home, you may go to a rehabilitation center. You will go home with a bandage and stitches or staples. Change the bandage as your doctor tells you to. Your doctor will remove your stitches or staples 10 to 21 days after your surgery. You may still have some mild pain, and the area may be swollen for 3 to 6 months after surgery. Your knee will continue to improve for 6 to 12 months. You will probably use a walker for 1 to 3 weeks and then use crutches. When you are ready, you can use a cane. You will probably be able to walk on your own in 4 to 8 weeks. You will need to do months of physical rehabilitation (rehab) after a knee replacement. Rehab will help you strengthen the muscles of the knee and help you regain movement. After you recover, your artificial knee will allow you to do normal daily activities with less pain or no pain at all. You may be able to hike, dance, ride a bike, and play golf. Talk to your doctor about whether you can do more strenuous activities. Always tell your caregivers that you have an artificial knee. How long it will take to walk on your own, return to normal activities, and go back to work depends on your health and how well your rehabilitation (rehab) program goes.  The better you do with your rehab exercises, the quicker you will get your strength and movement back. This care sheet gives you a general idea about how long it will take for you to recover. But each person recovers at a different pace. Follow the steps below to get better as quickly as possible. How can you care for yourself at home? Activity ? · Rest when you feel tired. You may take a nap, but do not stay in bed all day. When you sit, use a chair with arms. You can use the arms to help you stand up. ? · Work with your physical therapist to find the best way to exercise. You may be able to take frequent, short walks using crutches or a walker. What you can do as your knee heals will depend on whether your new knee is cemented or uncemented. You may not be able to do certain things for a while if your new knee is uncemented. ? · After your knee has healed enough, you can do more strenuous activities with caution. ¨ You can golf, but use a golf cart, and do not wear shoes with spikes. ¨ You can bike on a flat road or on a stationary bike. Avoid biking up hills. ¨ Your doctor may suggest that you stay away from activities that put stress on your knee. These include tennis or badminton, squash or racquetball, contact sports like football, jumping (such as in basketball), jogging, or running. ¨ Avoid activities where you might fall. These include horseback riding, skiing, and mountain biking. ? · Do not sit for more than 1 hour at a time. Get up and walk around for a while before you sit again. If you must sit for a long time, prop up your leg with a chair or footstool. This will help you avoid swelling. ? · Ask your doctor when you can shower. You may need to take sponge baths until your stitches or staples have been removed. ? · Ask your doctor when you can drive again. It may take up to 8 weeks after knee replacement surgery before it is safe for you to drive. ? · When you get into a car, sit on the edge of the seat. Then pull in your legs, and turn to face the front. ? · You should be able to do many everyday activities 3 to 6 weeks after your surgery. You will probably need to take 4 to 16 weeks off from work. When you can go back to work depends on the type of work you do and how you feel. ? · Ask your doctor when it is okay for you to have sex. ? · Do not lift anything heavier than 10 pounds and do not lift weights for 12 weeks. Diet ? · By the time you leave the hospital, you should be eating your normal diet. If your stomach is upset, try bland, low-fat foods like plain rice, broiled chicken, toast, and yogurt. Your doctor may suggest that you take iron and vitamin supplements. ? · Drink plenty of fluids (unless your doctor tells you not to). ? · Eat healthy foods, and watch your portion sizes. Try to stay at your ideal weight. Too much weight puts more stress on your new knee. ? · You may notice that your bowel movements are not regular right after your surgery. This is common. Try to avoid constipation and straining with bowel movements. You may want to take a fiber supplement every day. If you have not had a bowel movement after a couple of days, ask your doctor about taking a mild laxative. Medicines ? · Your doctor will tell you if and when you can restart your medicines. He or she will also give you instructions about taking any new medicines. ? · If you take blood thinners, such as warfarin (Coumadin), clopidogrel (Plavix), or aspirin, be sure to talk to your doctor. He or she will tell you if and when to start taking those medicines again. Make sure that you understand exactly what your doctor wants you to do.  
? · Your doctor may give you a blood-thinning medicine to prevent blood clots. If you take a blood thinner, be sure you get instructions about how to take your medicine safely.  Blood thinners can cause serious bleeding problems. This medicine could be in pill form or as a shot (injection). If a shot is necessary, your doctor will tell you how to do this. ? · Be safe with medicines. Take pain medicines exactly as directed. ¨ If the doctor gave you a prescription medicine for pain, take it as prescribed. ¨ If you are not taking a prescription pain medicine, ask your doctor if you can take an over-the-counter medicine. ¨ Plan to take your pain medicine 30 minutes before exercises. It is easier to prevent pain before it starts than to stop it once it has started. ? · If you think your pain medicine is making you sick to your stomach: 
¨ Take your medicine after meals (unless your doctor has told you not to). ¨ Ask your doctor for a different pain medicine. ? · If your doctor prescribed antibiotics, take them as directed. Do not stop taking them just because you feel better. You need to take the full course of antibiotics. Incision care ? · You will have a bandage over the cut (incision) and staples or stitches. Take the bandage off when your doctor says it is okay. ? · Your doctor will remove the staples or stitches 10 days to 3 weeks after the surgery and replace them with strips of tape. Leave the tape on for a week or until it falls off. Exercise ? · Your rehab program will give you a number of exercises to do to help you get back your knee's range of motion and strength. Always do them as your therapist tells you. Ice and elevation ? · For pain and swelling, put ice or a cold pack on the area for 10 to 20 minutes at a time. Put a thin cloth between the ice and your skin. Other instructions ? · Continue to wear your support stockings as your doctor says. These help to prevent blood clots. The length of time that you will have to wear them depends on your activity level and the amount of swelling. ? · You have metal pieces in your knee.  These may set off some airport metal detectors. Carry a medical alert card that says you have an artificial joint, just in case. Follow-up care is a key part of your treatment and safety. Be sure to make and go to all appointments, and call your doctor if you are having problems. It's also a good idea to know your test results and keep a list of the medicines you take. When should you call for help? Call 911 anytime you think you may need emergency care. For example, call if: 
? · You passed out (lost consciousness). ? · You have severe trouble breathing. ? · You have sudden chest pain and shortness of breath, or you cough up blood. ?Call your doctor now or seek immediate medical care if: 
? · You have signs of infection, such as: 
¨ Increased pain, swelling, warmth, or redness. ¨ Red streaks leading from the incision. ¨ Pus draining from the incision. ¨ A fever. ? · You have signs of a blood clot, such as: 
¨ Pain in your calf, back of the knee, thigh, or groin. ¨ Redness and swelling in your leg or groin. ? · Your incision comes open and begins to bleed, or the bleeding increases. ? · You have pain that does not get better after you take pain medicine. ? Watch closely for changes in your health, and be sure to contact your doctor if: 
? · You do not have a bowel movement after taking a laxative. Where can you learn more? Go to http://liza-shannan.info/. Enter S234 in the search box to learn more about \"Total Knee Replacement: What to Expect at Home. \" Current as of: March 21, 2017 Content Version: 11.4 © 7879-0576 [employer]. Care instructions adapted under license by [employer] (which disclaims liability or warranty for this information). If you have questions about a medical condition or this instruction, always ask your healthcare professional. [address] any warranty or liability for your use of this information. These are general instructions for a healthy lifestyle: No smoking/ No tobacco products/ Avoid exposure to second hand smoke Surgeon General's Warning:  Quitting smoking now greatly reduces serious risk to your health. Obesity, smoking, and sedentary lifestyle greatly increases your risk for illness A healthy diet, regular physical exercise & weight monitoring are important for maintaining a healthy lifestyle You may be retaining fluid if you have a history of heart failure or if you experience any of the following symptoms:  Weight gain of 3 pounds or more overnight or 5 pounds in a week, increased swelling in our hands or feet or shortness of breath while lying flat in bed. Please call your doctor as soon as you notice any of these symptoms; do not wait until your next office visit. Recognize signs and symptoms of STROKE: 
 
F-face looks uneven A-arms unable to move or move even S-speech slurred or non-existent T-time-call 911 as soon as signs and symptoms begin-DO NOT go Back to bed or wait to see if you get better-TIME IS BRAIN. The discharge information has been reviewed with the patient. The patient verbalized understanding. Information obtained by : 
I understand that if any problems occur once I am at home I am to contact my physician. I understand and acknowledge receipt of the instructions indicated above. Physician's or R.N.'s Signature                                                                  Date/Time Patient or Representative Signature                                                          Date/Time Introducing [facility]! Dear Imer Banda: Thank you for requesting a [employer] account. Our records indicate that you already have an active [employer] account. You can access your account anytime at [email or web address] Did you know that you can access your hospital and ER discharge instructions at any time in [employer]? You can also review all of your test results from your hospital stay or ER visit. Additional Information If you have questions, please visit the Frequently Asked Questions section of the Siminars website at [email or web address] Remember, Siminars is NOT to be used for urgent needs. For medical emergencies, dial 911. Now available from your iPhone and Android! Introducing Nelson Singh As a Pallavipetar Barrows patient, I wanted to make you aware of our electronic visit tool called Nelson Singh. HIGH MOBILITY 24/7 allows you to connect within minutes with a medical provider 24 hours a day, seven days a week via a mobile device or tablet or logging into a secure website from your computer. You can access Nelson Singh from anywhere in the [place]. A virtual visit might be right for you when you have a simple condition and feel like you just dont want to get out of bed, or cant get away from work for an appointment, when your regular Pallavipetar Santiagotts provider is not available (evenings, weekends or holidays), or when youre out of town and need minor care. Electronic visits cost only $49 and if the MyNinesriRoomiePics 24/7 provider determines a prescription is needed to treat your condition, one can be electronically transmitted to a nearby pharmacy*. Please take a moment to enroll today if you have not already done so. The enrollment process is free and takes just a few minutes. To enroll, please download the HIGH MOBILITY 24/Indelsul katerine to your tablet or phone, or visit [email or web address] to enroll on your computer. And, as an [address] [place] [address] patient with a [employer] account, the results of your visits will be scanned into your electronic medical record and your primary care provider will be able to view the scanned results. We urge you to continue to see your regular [facility] provider for your ongoing medical care. And while your primary care provider may not be the one available when you seek a Nelson Caseygiovannifin virtual visit, the peace of mind you get from getting a real diagnosis real time can be priceless. For more information on Nelson Caseygiovannifin, view our Frequently Asked Questions (FAQs) at [email or web address]. Sincerely, 
 
Flakito Velasco MD 
Chief Medical Officer Isa Melendez *:  certain medications cannot be prescribed via Nelson Caseydelilah Providers Seen During Your Hospitalization Provider Specialty Primary office phone Jacqueline Selby MD Orthopedic Surgery [phone] Your Primary Care Physician (PCP) Primary Care Physician Office Phone Office Fax Clara Rojasjosé luis 1521 [phone] You are allergic to the following No active allergies Recent Documentation OB Status Smoking Status Hysterectomy Former Smoker Emergency Contacts Name Discharge Info Relation Home Work Mobile Shaun Werner DISCHARGE CAREGIVER [3] Spouse [3] [phone] Leonie Morocho  Daughter [21] [phone] Ivette Acosta  Daughter [21] [phone] Patient Belongings The following personal items are in your possession at time of discharge: 
  Dental Appliances: None  Visual Aid: Glasses      Home Medications: None   Jewelry: Ring, Earrings       Other Valuables: Cell Phone (credit card and all valuables sent with [employer]) Please provide this summary of care documentation to your next provider. Signatures-by signing, you are acknowledging that this After Visit Summary has been reviewed with you and you have received a copy. Patient Signature:  ____________________________________________________________ Date:  ____________________________________________________________  
  
Kat Chowdhury Provider Signature:  ____________________________________________________________ Date:  ____________________________________________________________

## 2018-04-24 LAB
ANION GAP SERPL CALC-SCNC: 8 MMOL/L (ref 7–16)
BUN SERPL-MCNC: 15 MG/DL (ref 8–23)
CALCIUM SERPL-MCNC: 7.9 MG/DL (ref 8.3–10.4)
CHLORIDE SERPL-SCNC: 106 MMOL/L (ref 98–107)
CO2 SERPL-SCNC: 26 MMOL/L (ref 21–32)
CREAT SERPL-MCNC: 0.72 MG/DL (ref 0.6–1)
GLUCOSE SERPL-MCNC: 128 MG/DL (ref 65–100)
HGB BLD-MCNC: 11.7 G/DL (ref 11.7–15.4)
POTASSIUM SERPL-SCNC: 4.7 MMOL/L (ref 3.5–5.1)
SODIUM SERPL-SCNC: 140 MMOL/L (ref 136–145)

## 2018-04-24 PROCEDURE — 74011250636 HC RX REV CODE- 250/636: Performed by: PHYSICIAN ASSISTANT

## 2018-04-24 PROCEDURE — 97116 GAIT TRAINING THERAPY: CPT

## 2018-04-24 PROCEDURE — 97535 SELF CARE MNGMENT TRAINING: CPT

## 2018-04-24 PROCEDURE — 85018 HEMOGLOBIN: CPT | Performed by: PHYSICIAN ASSISTANT

## 2018-04-24 PROCEDURE — 94760 N-INVAS EAR/PLS OXIMETRY 1: CPT

## 2018-04-24 PROCEDURE — 74011250637 HC RX REV CODE- 250/637: Performed by: PHYSICIAN ASSISTANT

## 2018-04-24 PROCEDURE — 97110 THERAPEUTIC EXERCISES: CPT

## 2018-04-24 PROCEDURE — 36415 COLL VENOUS BLD VENIPUNCTURE: CPT | Performed by: PHYSICIAN ASSISTANT

## 2018-04-24 PROCEDURE — 97150 GROUP THERAPEUTIC PROCEDURES: CPT

## 2018-04-24 PROCEDURE — 65270000029 HC RM PRIVATE

## 2018-04-24 PROCEDURE — 94762 N-INVAS EAR/PLS OXIMTRY CONT: CPT

## 2018-04-24 PROCEDURE — 80048 BASIC METABOLIC PNL TOTAL CA: CPT | Performed by: PHYSICIAN ASSISTANT

## 2018-04-24 RX ADMIN — CELECOXIB 200 MG: 200 CAPSULE ORAL at 09:30

## 2018-04-24 RX ADMIN — CELECOXIB 200 MG: 200 CAPSULE ORAL at 20:12

## 2018-04-24 RX ADMIN — HYDROMORPHONE HYDROCHLORIDE: 2 INJECTION, SOLUTION INTRAMUSCULAR; INTRAVENOUS; SUBCUTANEOUS at 04:52

## 2018-04-24 RX ADMIN — SENNOSIDES AND DOCUSATE SODIUM 2 TABLET: 8.6; 5 TABLET ORAL at 09:30

## 2018-04-24 RX ADMIN — METOPROLOL SUCCINATE 25 MG: 25 TABLET, EXTENDED RELEASE ORAL at 09:00

## 2018-04-24 RX ADMIN — ASPIRIN 81 MG: 81 TABLET, COATED ORAL at 20:12

## 2018-04-24 RX ADMIN — LOSARTAN POTASSIUM 100 MG: 50 TABLET ORAL at 09:00

## 2018-04-24 RX ADMIN — Medication 2 G: at 01:12

## 2018-04-24 RX ADMIN — ACETAMINOPHEN 1000 MG: 500 TABLET, FILM COATED ORAL at 14:08

## 2018-04-24 RX ADMIN — DIPHENHYDRAMINE HYDROCHLORIDE 25 MG: 25 CAPSULE ORAL at 20:12

## 2018-04-24 RX ADMIN — ASPIRIN 81 MG: 81 TABLET, COATED ORAL at 09:30

## 2018-04-24 RX ADMIN — HYDROMORPHONE HYDROCHLORIDE 2 MG: 2 TABLET ORAL at 14:08

## 2018-04-24 RX ADMIN — LEVOTHYROXINE SODIUM 137 MCG: 50 TABLET ORAL at 04:52

## 2018-04-24 RX ADMIN — HYDROMORPHONE HYDROCHLORIDE 2 MG: 2 TABLET ORAL at 09:30

## 2018-04-24 RX ADMIN — DEXAMETHASONE SODIUM PHOSPHATE 10 MG: 10 INJECTION INTRAMUSCULAR; INTRAVENOUS at 13:00

## 2018-04-24 RX ADMIN — ONDANSETRON 4 MG: 2 INJECTION INTRAMUSCULAR; INTRAVENOUS at 04:52

## 2018-04-24 RX ADMIN — ACETAMINOPHEN 1000 MG: 500 TABLET, FILM COATED ORAL at 04:52

## 2018-04-24 RX ADMIN — Medication 5 ML: at 14:00

## 2018-04-24 RX ADMIN — HYDROMORPHONE HYDROCHLORIDE 2 MG: 2 TABLET ORAL at 22:04

## 2018-04-24 RX ADMIN — ACETAMINOPHEN 1000 MG: 500 TABLET, FILM COATED ORAL at 01:12

## 2018-04-24 RX ADMIN — HYDROMORPHONE HYDROCHLORIDE 2 MG: 2 TABLET ORAL at 17:50

## 2018-04-24 NOTE — PROGRESS NOTES
Problem: Mobility Impaired (Adult and Pediatric)  Goal: *Acute Goals and Plan of Care (Insert Text)  GOALS (1-4 days):  (1.)Ms. Sudeep Coleman will move from supine to sit and sit to supine  in bed with STAND BY ASSIST.  (2.)Ms. Sudeep Coleman will transfer from bed to chair and chair to bed with STAND BY ASSIST using the least restrictive device. (3.)Ms. Sudeep Coleman will ambulate with STAND BY ASSIST for 150 feet with the least restrictive device. (4.)Ms. Sudeep Coleman will ambulate up/down 2 steps with left railing with CONTACT GUARD ASSIST with device as needed  (5.)Ms. Sudeep Coleman will increase left knee ROM to 5°-80°.  ________________________________________________________________________________________________      PHYSICAL THERAPY Joint camp tKa: Daily Note, PM 4/24/2018  INPATIENT: Hospital Day: 2  Payor: [employer] / Plan: [address] / Product Type: Aff[employer]elgiorno Care Medicare /      NAME/AGE/GENDER: Yoselin Hill is a 77 y.o. female   PRIMARY DIAGNOSIS:  Primary localized osteoarthritis of left knee [M17.12]   Procedure(s) and Anesthesia Type:     * KNEE ARTHROPLASTY TOTAL LEFT/ SUE/ FNB - Spinal (Left)  ICD-10: Treatment Diagnosis:    · Pain in Left Knee (M25.562)  · Stiffness of Left Knee, Not elsewhere classified (M25.662)  · Difficulty in walking, Not elsewhere classified (R26.2)      ASSESSMENT:     Ms. Sudeep Coleman presents with limited rom and strength of left LE as well as decreased functional mobility and gait s/p left tka. She plans to go home with HHPT. Pt. Doing well. She ambulated in the room and did tka exercises with cues and assistance. Family present. No questions. This section established at most recent assessment   PROBLEM LIST (Impairments causing functional limitations):  1. Decreased Strength  2. Decreased ADL/Functional Activities  3. Decreased Transfer Abilities  4. Decreased Ambulation Ability/Technique  5. Decreased Balance  6. Increased Pain  7.  Decreased Activity Tolerance  8. Decreased Flexibility/Joint Mobility  9. Decreased [place] with Home Exercise Program   INTERVENTIONS PLANNED: (Benefits and precautions of physical therapy have been discussed with the patient.)  1. Bed Mobility  2. Gait Training  3. Home Exercise Program (HEP)  4. Therapeutic Exercise/Strengthening  5. Transfer Training  6. Range of Motion: active/assisted/passive  7. Therapeutic Activities  8. Group Therapy     TREATMENT PLAN: Frequency/Duration: Follow patient BID for duration of hospital stay to address above goals. Rehabilitation Potential For Stated Goals: Good     RECOMMENDED REHABILITATION/EQUIPMENT: (at time of discharge pending progress): Continue Skilled Therapy and Home Health: Physical Therapy. HISTORY:   History of Present Injury/Illness (Reason for Referral):  S/p left tka  Past Medical History/Comorbidities:   Ms. Nieves Tate  has a past medical history of Asthma with COPD ([facility] CAD (coronary artery disease); Environmental and seasonal allergies; H/O echocardiogram (11/14/2016); History of nuclear stress test (12/04/2017); Graves' disease; Hyperlipidemia (11/16/2012); Hypertension; Menopause; Obesity (BMI 30-39.9); Osteoarthritis; Routine health maintenance (11/16/2012); S/P PTCA (percutaneous transluminal coronary angioplasty) (2012); Shortness of breath (05/10/2016); Status post total left knee replacement (4/23/2018); Status post total right knee replacement (2/1/2016); Thyroid disease; Tremor (11/16/2012); and Ulcerative colitis ([facility] [address] (11/7/2013). She also has no past medical history of Adverse effect of anesthesia; Difficult intubation; Malignant hyperthermia due to anesthesia; Nausea & vomiting; Pseudocholinesterase deficiency; or Unspecified adverse effect of anesthesia.   Ms. Nieves Tate  has a past surgical history that includes hx lap cholecystectomy (2002); hx hysterectomy (1983); hx heent (1981); hx tonsil and adenoidectomy (1959); hx heent; hx ptca (2012); hx knee replacement (Right, 2016); hx cataract removal (Left, 08/15/2016); and hx ankle fracture tx (Right, ). Social History/Living Environment:   Home Environment: Private residence  # Steps to Enter: 1  Rails to Enter: Yes  One/Two Story Residence: One story  Living Alone: No  Support Systems: Spouse/Significant Other/Partner  Patient Expects to be Discharged to[de-identified] Private residence  Current DME Used/Available at Home: moy Miles, Walker, rolling, [address], [place], straight, Commode, bedside, Shower chair  Tub or Shower Type: Shower  Prior Level of Function/Work/Activity:  independent   Number of Personal Factors/Comorbidities that affect the Plan of Care: 1-2: MODERATE COMPLEXITY   EXAMINATION:   Most Recent Physical Functioning:                 LLE AROM  L Knee Flexion: 60  L Knee Extension: 10          Bed Mobility  Supine to Sit: Contact guard assistance  Sit to Supine: Contact guard assistance    Transfers  Sit to Stand: Contact guard assistance  Stand to Sit: Contact guard assistance  Bed to Chair: Contact guard assistance    Balance  Sitting: Intact  Standing: With support              Weight Bearing Status  Left Side Weight Bearing: As tolerated  Distance (ft): 25 Feet (ft)  Ambulation - Level of Assistance: Contact guard assistance  Assistive Device: Walker, rolling  Speed/Lupe: Delayed  Step Length: Left shortened;Right shortened  Stance: Left decreased  Gait Abnormalities: Antalgic  Interventions: Safety awareness training;Verbal cues     Braces/Orthotics:     Left Knee Cold  Type: Cryocuff      Body Structures Involved:  1. Bones  2. Joints  3. Muscles  4. Ligaments Body Functions Affected:  1. Movement Related Activities and Participation Affected:  1.  Mobility   Number of elements that affect the Plan of Care: 3: MODERATE COMPLEXITY   CLINICAL PRESENTATION:   Presentation: Stable and uncomplicated: LOW COMPLEXITY   CLINICAL DECISION MAKIN [facility] [address] [address] AM-PAC 6 Clicks   Basic Mobility Inpatient Short Form  How much difficulty does the patient currently have. .. Unable A Lot A Little None   1. Turning over in bed (including adjusting bedclothes, sheets and blankets)? [] 1   [] 2   [x] 3   [] 4   2. Sitting down on and standing up from a chair with arms ( e.g., wheelchair, bedside commode, etc.)   [] 1   [] 2   [x] 3   [] 4   3. Moving from lying on back to sitting on the side of the bed? [] 1   [] 2   [x] 3   [] 4   How much help from another person does the patient currently need. .. Total A Lot A Little None   4. Moving to and from a bed to a chair (including a wheelchair)? [] 1   [] 2   [x] 3   [] 4   5. Need to walk in hospital room? [] 1   [x] 2   [] 3   [] 4   6. Climbing 3-5 steps with a railing? [] 1   [x] 2   [] 3   [] 4   © 2007, Trustees of [address] [place] [address], under license to [employer]. All rights reserved        Score:  Initial: 16 Most Recent: X (Date: -- )    Interpretation of Tool:  Represents activities that are increasingly more difficult (i.e. Bed mobility, Transfers, Gait). Score 24 23 22-20 19-15 14-10 9-7 6     Modifier CH CI CJ CK CL CM CN      ? Mobility - Walking and Moving Around:     - CURRENT STATUS: CK - 40%-59% impaired, limited or restricted    - GOAL STATUS: CJ - 20%-39% impaired, limited or restricted    - D/C STATUS:  ---------------To be determined---------------  Payor: [employer] COMPLETE / Plan: [address] / Product Type: Managed Care Medicare /      Medical Necessity:     · Patient is expected to demonstrate progress in strength, range of motion and balance to decrease assistance required with theraputic exercises and functional mobility. Reason for Services/Other Comments:  · Patient continues to require present interventions due to patient's inability to perform theraputic exercises and functional mobility independently.    Use of outcome tool(s) and clinical judgement create a POC that gives a: Clear prediction of patient's progress: LOW COMPLEXITY            TREATMENT:   (In addition to Assessment/Re-Assessment sessions the following treatments were rendered)     Pre-treatment Symptoms/Complaints:  knee  Pain: Initial:      Post Session:  4     Gait Training (10 Minutes):  Gait training to improve and/or restore physical functioning as related to mobility, strength and balance. Ambulated 25 Feet (ft) with Contact guard assistance using a Walker, rolling and minimal Safety awareness training;Verbal cues related to their stance phase to promote proper body alignment, promote proper body posture and promote proper body mechanics. Therapeutic Exercise: (15 Minutes):  Exercises per grid below to improve mobility and strength. Required minimal visual, verbal and manual cues to promote proper body alignment, promote proper body posture and promote proper body mechanics. Progressed range and repetitions as indicated. Date:  4/24 Date:   Date:     ACTIVITY/EXERCISE AM PM AM PM AM PM   GROUP THERAPY  []  []  []  []  []  []   Ankle Pumps 15a        Quad Sets 10a        Gluteal Sets 10a        Hip ABd/ADduction 10a        Straight Leg Raises 10aa        Knee Slides 10aa        Short Arc Quads         Long Arc Quads         Chair Slides                  B = bilateral; AA = active assistive; A = active; P = passive      Treatment/Session Assessment:     Response to Treatment:  Pt. Making progress    Education:  [x] Home Exercises  [x] Fall Precautions  [] Hip Precautions [x] D/C Instruction Review  [x] Knee/Hip Prosthesis Review  [x] Walker Management/Safety [] Adaptive Equipment as Needed       Interdisciplinary Collaboration:   o Physical Therapist    After treatment position/precautions:   o Supine in bed  o Bed/Chair-wheels locked  o Bed in low position  o Caregiver at bedside  o Call light within reach  o Family at bedside    Compliance with Program/Exercises: Will assess as treatment progresses.   No questions. Recommendations/Intent for next treatment session:  Treatment next visit will focus on increasing Ms. Adriana Devine independence with bed mobility, transfers, gait training, strength/ROM exercises, modalities for pain, and patient education.       Total Treatment Duration:  PT Patient Time In/Time Out  Time In: 1921  Time Out: 1923 [place]

## 2018-04-24 NOTE — PROGRESS NOTES
04/24/18 0843   Oxygen Therapy   O2 Sat (%) 91 %   Pulse via Oximetry 73 beats per minute   O2 Device Room air   Patient achieved 1750 Ml/sec on IS. Patient encouraged to do 10 breaths every hour while awake-patient agreed and demonstrated. No shortness of breath or distress noted.

## 2018-04-24 NOTE — CONSULTS
Hospitalist Consult Note     Admit Date:  2018  2:01 AM   Name:  Jessica Strange   Age:  77 y.o.  :  1940   MRN:  [id number]   PCP:  Nik Sierra MD  Treatment Team: Attending Provider: Alicia Rowland MD; Utilization Review: Bhupinder Calixto RN; Care Manager: AMBER Calvert; Consulting Provider: Lenny Guerrero MD; Consulting Provider: Mariela Zamarripa MD    HPI:     The patient is a 76 y/o lady with HTN, Dyslipidemia, CAD s/p stent, Hypothyroidism, OA, is s/p Left TKA. A Hospitalist consultation was requested for management of her medical comorbidities. The patient was seen at the bedside. It seems like her surgery went well. The patient currently denies any significant complaints, except for some pain in the surgical area. She denies any SOB, chest pain or nausea. She anticipates discharge home in about 2 days. 10 systems reviewed and negative except as noted in HPI. Past Medical History:   Diagnosis Date    Asthma with COPD ([address]     Managed with daily and PRN inhaler     CAD (coronary artery disease)     2012: 3.0x 18 Xience MLAD, POBA D1, 2017: Normal stress MPI. Followed by [facility].  Environmental and seasonal allergies     H/O echocardiogram 2016    EF 71%    History of nuclear stress test 2017    EF: 70%    Hx of Graves' disease     Hyperlipidemia 2012    controlled with medication     Hypertension     controlled with medication     Menopause     Obesity (BMI 30-39. 9)     Osteoarthritis     Routine health maintenance 2012    S/P PTCA (percutaneous transluminal coronary angioplasty)     Patient takes daily 81 mg ASA-Followed by [facility]iology     Shortness of breath 05/10/2016    resolved-patient reports no recent SOB     Status post total left knee replacement 2018    Status post total right knee replacement 2016    Thyroid disease     Tremor 2012    hands     [facility] 11/7/2013      Past Surgical History:   Procedure Laterality Date    HX ANKLE FRACTURE TX Right 1956    HX CATARACT REMOVAL Left 08/15/2016    HX HEENT  1981    thyroid radioactive iodine tx    HX HEENT      Bridge in Mouth    HX HYSTERECTOMY  1983    HX KNEE REPLACEMENT Right 02/01/2016    HX LAP CHOLECYSTECTOMY  2002    HX PTCA  2012    3.0x1.8 Xience mLAD POBA D1    HX TONSIL AND ADENOIDECTOMY  1959      Current Facility-Administered Medications   Medication Dose Route Frequency    albuterol (PROVENTIL VENTOLIN) nebulizer solution 2.5 mg  2.5 mg Nebulization Q6H PRN    albuterol (PROVENTIL HFA, VENTOLIN HFA, PROAIR HFA) inhaler 2 Puff  2 Puff Inhalation Q4H PRN    fluticasone (FLONASE) 50 mcg/actuation nasal spray 2 Spray  2 Spray Both Nostrils QHS    [START ON 4/24/2018] levothyroxine (SYNTHROID) tablet 137 mcg  137 mcg Oral ACB    [START ON 4/24/2018] losartan (COZAAR) tablet 100 mg  100 mg Oral DAILY    [START ON 4/24/2018] metoprolol succinate (TOPROL-XL) XL tablet 25 mg  25 mg Oral DAILY    0.9% sodium chloride infusion  100 mL/hr IntraVENous CONTINUOUS    sodium chloride (NS) flush 5-10 mL  5-10 mL IntraVENous Q8H    sodium chloride (NS) flush 5-10 mL  5-10 mL IntraVENous PRN    ceFAZolin (ANCEF) 2 g/20 mL in sterile water IV syringe  2 g IntraVENous Q8H    [START ON 4/24/2018] acetaminophen (TYLENOL) tablet 1,000 mg  1,000 mg Oral Q6H    celecoxib (CELEBREX) capsule 200 mg  200 mg Oral Q12H    HYDROmorphone (DILAUDID) tablet 2 mg  2 mg Oral Q4H PRN    HYDROmorphone (PF) (DILAUDID) injection 1 mg  1 mg IntraVENous Q3H PRN    naloxone (NARCAN) injection 0.2-0.4 mg  0.2-0.4 mg IntraVENous Q10MIN PRN    [START ON 4/24/2018] dexamethasone (DECADRON) injection 10 mg  10 mg IntraVENous ONCE    ondansetron (ZOFRAN) injection 4 mg  4 mg IntraVENous Q4H PRN    diphenhydrAMINE (BENADRYL) capsule 25 mg  25 mg Oral Q4H PRN    [START ON 4/24/2018] senna-docusate (PERICOLACE) 8.6-50 mg per tablet 2 Tab  2 Tab Oral DAILY    aspirin delayed-release tablet 81 mg  81 mg Oral Q12H     No Known Allergies   Social History   Substance Use Topics    Smoking status: Former Smoker     Packs/day: 2.00     Years: 10.00     Types: Cigarettes     Quit date: 7/5/1970    Smokeless tobacco: Never Used      Comment: social smoker 1-2 qd    Alcohol use Yes      Comment: occ,.  social      Family History   Problem Relation Age of Onset    Heart Disease Mother     Arthritis-osteo Father     Hypertension Sister     Diabetes Sister     Hypertension Brother     Hypertension Brother     Hypertension Brother     Cancer Brother     Hypertension Brother     Breast Cancer Paternal Aunt 79      Immunization History   Administered Date(s) Administered    Influenza High Dose Vaccine PF 10/09/2015, 10/10/2016, 10/13/2017    Influenza Vaccine 10/04/2013, 09/11/2014    Influenza Vaccine Whole 09/25/2012    Pneumococcal Conjugate (PCV-13) 07/13/2015    Pneumococcal Polysaccharide (PPSV-23) 01/01/2008    Tdap 10/19/2016    Zoster Vaccine, Live 10/14/2013       Objective:   Patient Vitals for the past 24 hrs:   Temp Pulse Resp BP SpO2   04/23/18 1321 - - - - 95 %   04/23/18 1252 - - - - 98 %   04/23/18 1248 97 °F (36.1 °C) 66 16 148/76 96 %   04/23/18 1225 - 64 16 137/57 99 %   04/23/18 1215 - 77 16 138/65 98 %   04/23/18 1200 - 68 16 122/68 97 %   04/23/18 1142 - 67 16 125/61 97 %   04/23/18 1137 - 63 16 141/55 97 %   04/23/18 1132 - 68 16 125/57 97 %   04/23/18 1127 98.8 °F (37.1 °C) 70 18 139/63 97 %   04/23/18 0934 - (!) 57 16 149/65 98 %   04/23/18 0929 - 65 16 149/69 99 %   04/23/18 0924 - (!) 58 16 164/79 99 %   04/23/18 0920 - 64 16 176/77 99 %   04/23/18 0849 - 67 16 181/84 99 %   04/23/18 0726 98.1 °F (36.7 °C) 66 16 152/76 94 %     Oxygen Therapy  O2 Sat (%): 95 % (04/23/18 1321)  Pulse via Oximetry: 68 beats per minute (04/23/18 1321)  O2 Device: Room air (04/23/18 1321)  O2 Flow Rate (L/min): 2 l/min (04/23/18 1252)  FIO2 (%): 28 % (04/23/18 1252)    Intake/Output Summary (Last 24 hours) at 04/23/18 2007  Last data filed at 04/23/18 1225   Gross per 24 hour   Intake             1950 ml   Output              775 ml   Net             1175 ml       Physical Exam:  General:    Well nourished. Alert. Eyes:   Normal sclera. Extraocular movements intact. ENT:  Normocephalic, atraumatic. Moist mucous membranes  CV:   RRR. No murmur, rub, or gallop. Lungs:  CTAB. No wheezing, rhonchi, or rales. Abdomen: Soft, nontender, nondistended. Bowel sounds normal.   Extremities: Warm and dry. No cyanosis or edema. Mild tenderness over the surgical area on the left knee. Neurologic: CN II-XII grossly intact. Sensation intact. Skin:     No rashes or jaundice. Psych:  Normal mood and affect. I reviewed the labs, imaging, EKGs, telemetry, and other studies done this admission. Data Review:   Recent Results (from the past 24 hour(s))   TYPE & SCREEN    Collection Time: 04/23/18  7:45 AM   Result Value Ref Range    Crossmatch Expiration 04/26/2018     ABO/Rh(D) Kaya Killer POSITIVE     Antibody screen NEG    GLUCOSE, POC    Collection Time: 04/23/18  7:53 AM   Result Value Ref Range    Glucose (POC) 100 65 - 100 mg/dL       All Micro Results     None          Other Studies:  No results found.     Assessment and Plan:     Hospital Problems as of 4/23/2018  Date Reviewed: 4/18/2018          Codes Class Noted - Resolved POA    * (Principal)Status post total left knee replacement ICD-10-CM: J55.849  ICD-9-CM: V43.65  4/23/2018 - Present No            1 - OA s/p Left Total Knee Arthroplasty  2 - HTN  3 - Dyslipidemia  4 - CAD s/p stent  5 - Hypothyroidism    PLAN:  · Agree with current pain medication regimen  · Further post op care will be deferred to the Orthopedic Surgeon  · Resume home medications for all her chronic conditions which seem to be under control  · Discharge planning as per Orthopedics    Thank you very much, Dr Danielle Hayden, for allowing us to participate in the care of your patient. She seems to be clinically stable at this time. Will sign off. Please call us back as needed. Signed:   Marvin Skinner MD

## 2018-04-24 NOTE — PROGRESS NOTES
2018         Post Op day: 1 Day Post-OpProcedure(s) (LRB):  KNEE ARTHROPLASTY TOTAL LEFT/ SUE/ FNB (Left)      Admit Date: 2018  Admit Diagnosis: Primary localized osteoarthritis of left knee [M17.12]    LAB:    Recent Results (from the past 24 hour(s))   GLUCOSE, POC    Collection Time: 18  7:53 AM   Result Value Ref Range    Glucose (POC) 100 65 - 100 mg/dL   HEMOGLOBIN    Collection Time: 18  7:28 PM   Result Value Ref Range    HGB 12.5 11.7 - 15.4 g/dL   HEMOGLOBIN    Collection Time: 18  4:55 AM   Result Value Ref Range    HGB 11.7 11.7 - 08.9 g/dL   METABOLIC PANEL, BASIC    Collection Time: 18  4:55 AM   Result Value Ref Range    Sodium 140 136 - 145 mmol/L    Potassium 4.7 3.5 - 5.1 mmol/L    Chloride 106 98 - 107 mmol/L    CO2 26 21 - 32 mmol/L    Anion gap 8 7 - 16 mmol/L    Glucose 128 (H) 65 - 100 mg/dL    BUN 15 8 - 23 MG/DL    Creatinine 0.72 0.6 - 1.0 MG/DL    GFR est AA >60 >60 ml/min/1.73m2    GFR est non-AA >60 >60 ml/min/1.73m2    Calcium 7.9 (L) 8.3 - 10.4 MG/DL     Vital Signs:    Patient Vitals for the past 8 hrs:   BP Temp Pulse Resp SpO2   18 0651 125/76 98.4 °F (36.9 °C) 77 18 91 %   18 0420 128/70 97.8 °F (36.6 °C) 69 19 92 %   18 0045 131/75 98 °F (36.7 °C) 74 18 91 %     Temp (24hrs), Av.1 °F (36.7 °C), Min:97 °F (36.1 °C), Max:98.8 °F (37.1 °C)    There is no height or weight on file to calculate BMI.   Pain Control:   Pain Assessment  Pain Scale 1: Numeric (0 - 10)  Pain Intensity 1: 0  Pain Onset 1: several months  Pain Location 1: Knee  Pain Orientation 1: Left  Pain Description 1: Aching, Dull  Pain Intervention(s) 1: Medication (see MAR), Cold pack    Subjective: Doing well, No complaints, No SOB, No Chest Pain, No Nausea or Vomitting     Objective: Vital Signs are Stable, No Acute Distress, Alert and Oriented, Dressing is Dry,  Neurovascular exam is normal.       PT/OT:            Assistive Device: Walker (comment) LLE AROM  L Knee Flexion: 60  L Knee Extension: 15       Weight Bearing Status: WBAT    Meds:  [unfilled]  [unfilled]  [unfilled]    Assessment:   Patient Active Problem List   Diagnosis Code    Coronary atherosclerosis of native coronary artery I25.10    S/P PTCA (percutaneous transluminal coronary angioplasty) Z98.61    Dyslipidemia E78.5    Hypertensive cardiovascular disease I11.9    Obesity (BMI 30-39. 9) E66.9    Tremor R25.1    [facility] with [facility] ([facility] J44.9    Hypothyroid E03.9    Allergic rhinitis J30.9    Ulcerative colitis ([facility] K51.90    PPD positive R76.11    History of tobacco use Z87.891    Status post total right knee replacement Z96.651    Status post total left knee replacement Z96.652             Plan: Continue Physical Therapy, Monitor  Hbg, home tomorrow.         Signed By: Phong Mahmood MD

## 2018-04-24 NOTE — PROGRESS NOTES
Problem: Mobility Impaired (Adult and Pediatric)  Goal: *Acute Goals and Plan of Care (Insert Text)  GOALS (1-4 days):  (1.)Ms. Tatyana Edmonds will move from supine to sit and sit to supine  in bed with STAND BY ASSIST.  (2.)Ms. Tatyana Edmonds will transfer from bed to chair and chair to bed with STAND BY ASSIST using the least restrictive device. (3.)Ms. Tatyana Edmonds will ambulate with STAND BY ASSIST for 150 feet with the least restrictive device. (4.)Ms. Tatyana Edmonds will ambulate up/down 2 steps with left railing with CONTACT GUARD ASSIST with device as needed  (5.)Ms. Tatyana Edmonds will increase left knee ROM to 5°-80°.  ________________________________________________________________________________________________      PHYSICAL THERAPY [facility] tKa: Daily Note, PM 4/24/2018  INPATIENT: Hospital Day: 2  Payor: [address] [place] [address] / Plan: Ifeoma Worthington / Product Type: [employer] /      NAME/AGE/GENDER: María Sinha is a 77 y.o. female   PRIMARY DIAGNOSIS:  Primary localized osteoarthritis of left knee [M17.12]   Procedure(s) and Anesthesia Type:     * KNEE ARTHROPLASTY TOTAL LEFT/ SUE/ FNB - Spinal (Left)  ICD-10: Treatment Diagnosis:    · Pain in Left Knee (M25.562)  · Stiffness of Left Knee, Not elsewhere classified (M25.662)  · Difficulty in walking, Not elsewhere classified (R26.2)      ASSESSMENT:     Ms. Tatyana Edmonds presents with limited rom and strength of left LE as well as decreased functional mobility and gait s/p left tka. She plans to go home with HHPT. Pt. Making progress this afternoon increasing gait distance with walker and performing tka exercises with cues and assistance. Improved gait quality. rom limited by pain. Plans to go home tomorrow. This section established at most recent assessment   PROBLEM LIST (Impairments causing functional limitations):  1. Decreased Strength  2. Decreased ADL/Functional Activities  3. Decreased Transfer Abilities  4.  Decreased Ambulation Ability/Technique  5. Decreased Balance  6. Increased Pain  7. Decreased Activity Tolerance  8. Decreased Flexibility/Joint Mobility  9. Decreased [place] with Home Exercise Program   INTERVENTIONS PLANNED: (Benefits and precautions of physical therapy have been discussed with the patient.)  1. Bed Mobility  2. Gait Training  3. Home Exercise Program (HEP)  4. Therapeutic Exercise/Strengthening  5. Transfer Training  6. Range of Motion: active/assisted/passive  7. Therapeutic Activities  8. Group Therapy     TREATMENT PLAN: Frequency/Duration: Follow patient BID for duration of hospital stay to address above goals. Rehabilitation Potential For Stated Goals: Good     RECOMMENDED REHABILITATION/EQUIPMENT: (at time of discharge pending progress): Continue Skilled Therapy and Home Health: Physical Therapy. HISTORY:   History of Present Injury/Illness (Reason for Referral):  S/p left tka  Past Medical History/Comorbidities:   Ms. Stephanie Cage  has a past medical history of Asthma with COPD ([facility] CAD (coronary artery disease); Environmental and seasonal allergies; H/O echocardiogram (11/14/2016); History of nuclear stress test (12/04/2017); Graves' disease; Hyperlipidemia (11/16/2012); Hypertension; Menopause; Obesity (BMI 30-39.9); Osteoarthritis; Routine health maintenance (11/16/2012); S/P PTCA (percutaneous transluminal coronary angioplasty) (2012); Shortness of breath (05/10/2016); Status post total left knee replacement (4/23/2018); Status post total right knee replacement (2/1/2016); Thyroid disease; Tremor (11/16/2012); and Ulcerative colitis ([facility] (11/7/2013). She also has no past medical history of Adverse effect of anesthesia; Difficult intubation; Malignant hyperthermia due to anesthesia; Nausea & vomiting; Pseudocholinesterase deficiency; or Unspecified adverse effect of anesthesia.   Ms. Stephanie Cage  has a past surgical history that includes hx lap cholecystectomy (2002); hx hysterectomy (1983); hx heent (1981); hx tonsil and adenoidectomy (1959); hx heent; hx ptca (2012); hx knee replacement (Right, 02/01/2016); hx cataract removal (Left, 08/15/2016); and hx ankle fracture tx (Right, 1956). Social History/Living Environment:   Home Environment: Private residence  # Steps to Enter: 1  Rails to Enter: Yes  One/Two Story Residence: One story  Living Alone: No  Support Systems: Spouse/Significant Other/Partner  Patient Expects to be Discharged to[de-identified] Private residence  Current DME Used/Available at Home: Sue Carrillo, rollator, Walker, rolling, Alma Rosa Jointer, straight, Commode, bedside, Shower chair  Tub or Shower Type: Shower  Prior Level of Function/Work/Activity:  independent   Number of Personal Factors/Comorbidities that affect the Plan of Care: 1-2: MODERATE COMPLEXITY   EXAMINATION:   Most Recent Physical Functioning:                 LLE AROM  L Knee Flexion: 82  L Knee Extension: 5          Bed Mobility  Supine to Sit: Minimum assistance  Sit to Supine: Contact guard assistance    Transfers  Sit to Stand: Contact guard assistance  Stand to Sit: Contact guard assistance  Bed to Chair: Contact guard assistance    Balance  Sitting: Intact  Standing: With support              Weight Bearing Status  Left Side Weight Bearing: As tolerated  Distance (ft): 80 Feet (ft)  Ambulation - Level of Assistance: Contact guard assistance  Assistive Device: Walker, rolling  Speed/Lupe: Delayed  Step Length: Left shortened;Right shortened  Stance: Left decreased  Gait Abnormalities: Antalgic  Interventions: Safety awareness training;Verbal cues     Braces/Orthotics:     Left Knee Cold  Type: Cryocuff      Body Structures Involved:  1. Bones  2. Joints  3. Muscles  4. Ligaments Body Functions Affected:  1. Movement Related Activities and Participation Affected:  1.  Mobility   Number of elements that affect the Plan of Care: 3: MODERATE COMPLEXITY   CLINICAL PRESENTATION:   Presentation: Stable and uncomplicated: LOW COMPLEXITY   CLINICAL DECISION MAKIN[address] [place] [id number] AM-PAC 6 Clicks   Basic Mobility Inpatient Short Form  How much difficulty does the patient currently have. .. Unable A Lot A Little None   1. Turning over in bed (including adjusting bedclothes, sheets and blankets)? [] 1   [] 2   [x] 3   [] 4   2. Sitting down on and standing up from a chair with arms ( e.g., wheelchair, bedside commode, etc.)   [] 1   [] 2   [x] 3   [] 4   3. Moving from lying on back to sitting on the side of the bed? [] 1   [] 2   [x] 3   [] 4   How much help from another person does the patient currently need. .. Total A Lot A Little None   4. Moving to and from a bed to a chair (including a wheelchair)? [] 1   [] 2   [x] 3   [] 4   5. Need to walk in hospital room? [] 1   [x] 2   [] 3   [] 4   6. Climbing 3-5 steps with a railing? [] 1   [x] 2   [] 3   [] 4   © 2007, Trustees of [address] [place], [place] [id number], under license to [employer]. All rights reserved        Score:  Initial: 16 Most Recent: X (Date: -- )    Interpretation of Tool:  Represents activities that are increasingly more difficult (i.e. Bed mobility, Transfers, Gait). Score 24 23 22-20 19-15 14-10 9-7 6     Modifier CH CI CJ CK CL CM CN      ? Mobility - Walking and Moving Around:     - CURRENT STATUS: CK - 40%-59% impaired, limited or restricted    - GOAL STATUS: CJ - 20%-39% impaired, limited or restricted    - D/C STATUS:  ---------------To be determined---------------  Payor: [employer] MEDICARE COMPLETE / Plan: Liz Henriquez / Product Type: Managed Care Medicare /      Medical Necessity:     · Patient is expected to demonstrate progress in strength, range of motion and balance to decrease assistance required with theraputic exercises and functional mobility. Reason for Services/Other Comments:  · Patient continues to require present interventions due to patient's inability to perform theraputic exercises and functional mobility independently.    Use of outcome tool(s) and clinical judgement create a POC that gives a: Clear prediction of patient's progress: LOW COMPLEXITY            TREATMENT:   (In addition to Assessment/Re-Assessment sessions the following treatments were rendered)     Pre-treatment Symptoms/Complaints:  knee  Pain: Initial:      Post Session:  6     Gait Training (15 Minutes):  Gait training to improve and/or restore physical functioning as related to mobility, strength and balance. Ambulated 80 Feet (ft) with Contact guard assistance using a Walker, rolling and minimal Safety awareness training;Verbal cues related to their stance phase to promote proper body alignment, promote proper body posture and promote proper body mechanics. Therapeutic Exercise: (45 Minutes):  Exercises per grid below to improve mobility and strength. Required minimal visual, verbal and manual cues to promote proper body alignment, promote proper body posture and promote proper body mechanics. Progressed range and repetitions as indicated. Date:  4/24 Date:   Date:     ACTIVITY/EXERCISE AM PM AM PM AM PM   GROUP THERAPY  []  [x]  []  []  []  []   Ankle Pumps 15a 15a       Quad Sets 10a 15a       Gluteal Sets 10a 15a       Hip ABd/ADduction 10a 15a       Straight Leg Raises 10aa 15a       Knee Slides 10aa 15aa       Short Arc Quads  15a       Long Arc Quads         Chair Slides  15a                B = bilateral; AA = active assistive; A = active; P = passive      Treatment/Session Assessment:     Response to Treatment:  Pt. Doing well.     Education:  [x] Home Exercises  [x] Fall Precautions  [] Hip Precautions [x] D/C Instruction Review  [x] Knee/Hip Prosthesis Review  [x] Walker Management/Safety [] Adaptive Equipment as Needed       Interdisciplinary Collaboration:   o Physical Therapist  o Physical Therapy Assistant  o Rehabilitation Attendant    After treatment position/precautions:   o Up in chair  o Bed/Chair-wheels locked  o Bed in low position  o Caregiver at bedside  o Call light within reach  o Family at bedside    Compliance with Program/Exercises: Will assess as treatment progresses. No questions. Recommendations/Intent for next treatment session:  Treatment next visit will focus on increasing Ms. Ambika Barrier independence with bed mobility, transfers, gait training, strength/ROM exercises, modalities for pain, and patient education.       Total Treatment Duration:  PT Patient Time In/Time Out  Time In: 1300  Time Out: 2202 Mayra Mcgowan PT

## 2018-04-24 NOTE — PROGRESS NOTES
Pt is alert and oriented x 4. Spouse is at bedside. Rates pain 5/10 to left knee. PRN pain meds given with effective results. Physical assessment done. No areas of concern noted at this time. Oriented to room and call light. Bed alarm on and functioning. Call light within reach.

## 2018-04-24 NOTE — PROGRESS NOTES
Problem: Self Care Deficits Care Plan (Adult)  Goal: *Acute Goals and Plan of Care (Insert Text)  GOALS:   DISCHARGE GOALS (in preparation for going home/rehab):  3 days  1. Ms. Sheryn Boast will perform one lower body dressing activity with minimal assistance required to demonstrate improved functional mobility and safety. met  2. Ms. Sheryn Boast will perform one lower body bathing activity with minimal assistance required to demonstrate improved functional mobility and safety. met  3. Ms. Sheryn Boast will perform toileting/toilet transfer with contact guard assistance to demonstrate improved functional mobility and safety. met  4. Ms. Sheryn Boast will perform shower transfer with contact guard assistance to demonstrate improved functional mobility and safety. met    JOINT CAMP OCCUPATIONAL THERAPY TKA: Daily Note, Discharge and AM 4/24/2018  INPATIENT: Hospital Day: 2  Payor: [employer] / Plan: [address] / Product Type: Managed Care Medicare /      NAME/AGE/GENDER: Myron Perez is a 77 y.o. female   PRIMARY DIAGNOSIS:  Primary localized osteoarthritis of left knee [M17.12]   Procedure(s) and Anesthesia Type:     * KNEE ARTHROPLASTY TOTAL LEFT/ SUE/ FNB - Spinal (Left)  ICD-10: Treatment Diagnosis:    · Pain in Left Knee (M25.562)  · Stiffness of Left Knee, Not elsewhere classified (M25.662)  · Generalized Muscle Weakness (M62.81)  · Other lack of cordination (R27.8)      ASSESSMENT:     Ms. Sheryn Boast is s/p L TKA and presents with decreased weight bearing on L LE and decreased independence with functional mobility and activities of daily living. Patient completed shower and dressing as charter below in ADL grid and is ambulating with rolling walker and contact guard assist.  Patient has met 4/4 goals and plans to return home with good family support. Family able to provide patient with appropriate level of assistance at this time.   OT reviewed safety precautions throughout session and therapy schedule for the remainder of today. Patient instructed to call for assistance when needing to get up from recliner and all needs in reach. Patient verbalized understanding of call light. This section established at most recent assessment   PROBLEM LIST (Impairments causing functional limitations):  1. Decreased Strength  2. Decreased ADL/Functional Activities  3. Decreased Transfer Abilities  4. Increased Pain  5. Increased Fatigue  6. Decreased Flexibility/Joint Mobility  7. Decreased Knowledge of Precautions   INTERVENTIONS PLANNED: (Benefits and precautions of occupational therapy have been discussed with the patient.)  1. Activities of daily living training  2. Adaptive equipment training  3. Balance training  4. Clothing management  5. Donning&doffing training  6. Theraputic activity     TREATMENT PLAN: Frequency/Duration: Follow patient 1 time to address above goals. Rehabilitation Potential For Stated Goals: Excellent     RECOMMENDED REHABILITATION/EQUIPMENT: (at time of discharge pending progress): Continue Skilled Therapy and Home Health: Physical Therapy. OCCUPATIONAL PROFILE AND HISTORY:   History of Present Injury/Illness (Reason for Referral): Pt presents this date s/p (left) TKA. Past Medical History/Comorbidities:   Ms. 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  She also has no past medical history of Adverse effect of anesthesia; Difficult intubation; Malignant hyperthermia due to anesthesia; Nausea & vomiting; Pseudocholinesterase deficiency; or Unspecified adverse effect of anesthesia. Ms. Ann Zimmer  has a past surgical history that includes hx lap cholecystectomy (2002); hx hysterectomy (1983); hx heent (1981); hx tonsil and adenoidectomy (1959); hx heent; hx ptca (2012); hx knee replacement (Right, 02/01/2016); hx cataract removal (Left, 08/15/2016); and hx ankle fracture tx (Right, 1956). Social History/Living Environment:   Home Environment: Private residence  # Steps to Enter: 1  Rails to Enter: Yes  One/Two Story Residence: One story  Living Alone: No  Support Systems: Spouse/Significant Other/Partner  Patient Expects to be Discharged to[de-identified] Private residence  Current DME Used/Available at Home: [place] [employer], rollator, Walker, rolling, Talha Jovanni, straight, Commode, bedside, Shower chair  Tub or Shower Type: Shower  Prior Level of Function/Work/Activity:  Indep with self care and functional mobility     Number of Personal Factors/Comorbidities that affect the Plan of Care: Brief history (0):  LOW COMPLEXITY   ASSESSMENT OF OCCUPATIONAL PERFORMANCE[de-identified]   Most Recent Physical Functioning:   Balance  Sitting: Intact  Standing: With support                              Mental Status  Neurologic State: Alert  Orientation Level: Oriented X4  Cognition: Follows commands  Perception: Appears intact  Perseveration: No perseveration noted  Safety/Judgement: Awareness of environment                Basic ADLs (From Assessment) Complex ADLs (From Assessment)   Basic ADL  Feeding: Supervision  Oral Facial Hygiene/Grooming: Supervision  Bathing: Moderate assistance  Type of Bath: Chlorhexidine (CHG), Full, Shower  Upper Body Dressing: Minimum assistance  Lower Body Dressing: Moderate assistance  Toileting:  Total assistance     Grooming/Bathing/Dressing Activities of Daily Living   Grooming  Grooming Assistance: Supervision/set up  Brushing/Combing Hair: Supervision/set-up Cognitive Retraining  Safety/Judgement: Awareness of environment   Upper Body Bathing  Bathing Assistance: Supervision/set-up  Position Performed: Seated in chair;Standing  Cues: Verbal cues provided  Adaptive Equipment: Grab bar; Shower chair Feeding  Feeding Assistance: Supervision/set-up   Lower Body Bathing  Bathing Assistance: Supervision/set-up  Perineal  : Supervision/set-up  Position Performed: Seated in chair;Standing  Cues: Verbal cues provided  Adaptive Equipment: Grab bar  Lower Body : Supervision/set-up  Position Performed: Seated in chair;Standing  Cues: Verbal cues provided  Adaptive Equipment: Grab bar; Shower chair Toileting  Toileting Assistance: Supervision/set up   Upper Body Dressing Assistance  Dressing Assistance: Supervision/set-up  Bra: Supervision/set-up  Pullover Shirt: Supervision/set-up Functional Transfers  Bathroom Mobility: Contact guard assistance  Toilet Transfer : Contact guard assistance  Shower Transfer: Contact guard assistance  Cues: Verbal cues provided  Adaptive Equipment: Bedside commode;Grab bars; Shower chair with back; Walker (comment)   Lower Body Dressing Assistance  Dressing Assistance: Supervision/set up  Underpants: Supervision/set-up  Pants With Elastic Waist: Supervision/set-up  Socks: Compensatory technique training ( socks)  Adaptive Equipment Used: Grab bar;Walker Bed/Mat Mobility  Supine to Sit: Contact guard assistance  Sit to Supine: Contact guard assistance  Sit to Stand: Contact guard assistance  Bed to Chair: Contact guard assistance         Physical Skills Involved:  1. Range of Motion  2. Balance  3. Strength Cognitive Skills Affected (resulting in the inability to perform in a timely and safe manner):  1. None Psychosocial Skills Affected:  1.  None   Number of elements that affect the Plan of Care: 1-3:  LOW COMPLEXITY   CLINICAL DECISION MAKIN [facility] [address] [id number] AM-PAC 7 Clicks   Daily Activity Inpatient Short Form  How much help from another person does the patient currently need. .. Total A Lot A Little None   1. Putting on and taking off regular lower body clothing? [] 1   [x] 2   [] 3   [] 4   2. Bathing (including washing, rinsing, drying)? [] 1   [x] 2   [] 3   [] 4   3. Toileting, which includes using toilet, bedpan or urinal?   [] 1   [x] 2   [] 3   [] 4   4. Putting on and taking off regular upper body clothing? [] 1   [] 2   [x] 3   [] 4   5. Taking care of personal grooming such as brushing teeth? [] 1   [] 2   [x] 3   [] 4   6. Eating meals? [] 1   [] 2   [x] 3   [] 4   © 2007, Trustees of [address] [place] [address], under license to [employer]. All rights reserved     Score:  Initial: 15 Most Recent: X (Date: -- )    Interpretation of Tool:  Represents activities that are increasingly more difficult (i.e. Bed mobility, Transfers, Gait). Score 24 23 22-20 19-15 14-10 9-7 6     Modifier CH CI CJ CK CL CM CN      ? Self Care:     - CURRENT STATUS: CK - 40%-59% impaired, limited or restricted    - GOAL STATUS: CJ - 20%-39% impaired, limited or restricted    - D/C STATUS:  ---------------To be determined---------------  Payor: Lon Ghosh / Plan: [facility] MEDICARE COMPLETE / Product Type: Managed Care Medicare /      Medical Necessity:     · Patient is expected to demonstrate progress in balance, coordination and functional technique to decrease assistance required with self care and functional mobility. Reason for Services/Other Comments:  · Patient continues to require skilled intervention due to decreased self care and functional mobility.    Use of outcome tool(s) and clinical judgement create a POC that gives a: LOW COMPLEXITY            TREATMENT:   (In addition to Assessment/Re-Assessment sessions the following treatments were rendered)     Pre-treatment Symptoms/Complaints:  None  Pain: Initial:   Pain Intensity 1: 0  Post Session:  0     Self Care: (40): Procedure(s) (per grid) utilized to improve and/or restore self-care/home management as related to dressing, bathing, toileting and grooming. Required minimal verbal and   cueing to facilitate activities of daily living skills and compensatory activities. Treatment/Session Assessment:     Response to Treatment:  Tolerated well. Education:  [] Home Exercises  [x] Fall Precautions  [] Hip Precautions [] Going Home Video  [x] Knee/Hip Prosthesis Review  [x] Walker Management/Safety [x] Adaptive Equipment as Needed       Interdisciplinary Collaboration:   o Occupational Therapist  o Registered Nurse    After treatment position/precautions:   o Up in chair  o Bed/Chair-wheels locked  o Caregiver at bedside  o Call light within reach  o RN notified  o LEs reclined, needs in reach     Compliance with Program/Exercises: compliant all of the time. Recommendations:Pt doing well all goals met and will do well at home with support from family. Patient will be discharged home with home health PT. No further Occupational Therapy warranted, will discharge Occupational Therapy services.        Total Treatment Duration:  OT Patient Time In/Time Out  Time In: 0630  Time Out: [address], OT

## 2018-04-25 VITALS
SYSTOLIC BLOOD PRESSURE: 108 MMHG | TEMPERATURE: 98.1 F | DIASTOLIC BLOOD PRESSURE: 66 MMHG | OXYGEN SATURATION: 93 % | HEART RATE: 64 BPM | RESPIRATION RATE: 16 BRPM

## 2018-04-25 LAB — HGB BLD-MCNC: 10.6 G/DL (ref 11.7–15.4)

## 2018-04-25 PROCEDURE — 97150 GROUP THERAPEUTIC PROCEDURES: CPT

## 2018-04-25 PROCEDURE — 85018 HEMOGLOBIN: CPT | Performed by: PHYSICIAN ASSISTANT

## 2018-04-25 PROCEDURE — 36415 COLL VENOUS BLD VENIPUNCTURE: CPT | Performed by: PHYSICIAN ASSISTANT

## 2018-04-25 PROCEDURE — 97116 GAIT TRAINING THERAPY: CPT

## 2018-04-25 PROCEDURE — 74011250636 HC RX REV CODE- 250/636: Performed by: PHYSICIAN ASSISTANT

## 2018-04-25 PROCEDURE — 74011250637 HC RX REV CODE- 250/637: Performed by: PHYSICIAN ASSISTANT

## 2018-04-25 RX ADMIN — ACETAMINOPHEN 1000 MG: 500 TABLET, FILM COATED ORAL at 04:27

## 2018-04-25 RX ADMIN — HYDROMORPHONE HYDROCHLORIDE 2 MG: 2 TABLET ORAL at 09:12

## 2018-04-25 RX ADMIN — LEVOTHYROXINE SODIUM 137 MCG: 50 TABLET ORAL at 04:28

## 2018-04-25 RX ADMIN — SENNOSIDES AND DOCUSATE SODIUM 2 TABLET: 8.6; 5 TABLET ORAL at 09:13

## 2018-04-25 RX ADMIN — Medication 10 ML: at 04:29

## 2018-04-25 RX ADMIN — CELECOXIB 200 MG: 200 CAPSULE ORAL at 09:13

## 2018-04-25 RX ADMIN — ASPIRIN 81 MG: 81 TABLET, COATED ORAL at 09:13

## 2018-04-25 RX ADMIN — METOPROLOL SUCCINATE 25 MG: 25 TABLET, EXTENDED RELEASE ORAL at 09:13

## 2018-04-25 RX ADMIN — ONDANSETRON 4 MG: 2 INJECTION INTRAMUSCULAR; INTRAVENOUS at 04:27

## 2018-04-25 RX ADMIN — LOSARTAN POTASSIUM 100 MG: 50 TABLET ORAL at 09:13

## 2018-04-25 RX ADMIN — HYDROMORPHONE HYDROCHLORIDE 2 MG: 2 TABLET ORAL at 04:27

## 2018-04-25 NOTE — PROGRESS NOTES
Problem: Mobility Impaired (Adult and Pediatric)  Goal: *Acute Goals and Plan of Care (Insert Text)  GOALS (1-4 days):  (1.)Ms. Ijeoma Mario will move from supine to sit and sit to supine  in bed with STAND BY ASSIST.  (2.)Ms. Ijeoma Maroi will transfer from bed to chair and chair to bed with STAND BY ASSIST using the least restrictive device. goal met  (3.)Ms. Ijeoma Mario will ambulate with STAND BY ASSIST for 150 feet with the least restrictive device. goal met  (4.)Ms. Ijeoma Mario will ambulate up/down 2 steps with left railing with CONTACT GUARD ASSIST with device as needed  (5.)Ms. Ijeoma Mario will increase left knee ROM to 5°-80°.  ________________________________________________________________________________________________      PHYSICAL THERAPY [facility] camp tKa: Daily Note, AM 4/25/2018  INPATIENT: Hospital Day: 3  Payor: Sara Schwartz / Plan: [address] / Product Type: [employer] Medicare /      NAME/AGE/GENDER: Shola Ladd is a 77 y.o. female   PRIMARY DIAGNOSIS:  Primary localized osteoarthritis of left knee [M17.12]   Procedure(s) and Anesthesia Type:     * KNEE ARTHROPLASTY TOTAL LEFT/ SUE/ FNB - Spinal (Left)  ICD-10: Treatment Diagnosis:    · Pain in Left Knee (M25.562)  · Stiffness of Left Knee, Not elsewhere classified (M25.662)  · Difficulty in walking, Not elsewhere classified (R26.2)      ASSESSMENT:     Ms. Ijeoma Mario presents with limited rom and strength of left LE as well as decreased functional mobility and gait s/p left tka. She plans to go home with HHPT. Pt. Continues to make progress and is doing well. She plans to go home today. No questions or concerns from patient or family. She did well with gait with walker and did stairs without difficulty. She did tka exercises with cues. Stressed rom to patient. This section established at most recent assessment   PROBLEM LIST (Impairments causing functional limitations):  1. Decreased Strength  2.  Decreased ADL/Functional Activities  3. Decreased Transfer Abilities  4. Decreased Ambulation Ability/Technique  5. Decreased Balance  6. Increased Pain  7. Decreased Activity Tolerance  8. Decreased Flexibility/Joint Mobility  9. Decreased [place] with Home Exercise Program   INTERVENTIONS PLANNED: (Benefits and precautions of physical therapy have been discussed with the patient.)  1. Bed Mobility  2. Gait Training  3. Home Exercise Program (HEP)  4. Therapeutic Exercise/Strengthening  5. Transfer Training  6. Range of Motion: active/assisted/passive  7. Therapeutic Activities  8. Group Therapy     TREATMENT PLAN: Frequency/Duration: Follow patient BID for duration of hospital stay to address above goals. Rehabilitation Potential For Stated Goals: Good     RECOMMENDED REHABILITATION/EQUIPMENT: (at time of discharge pending progress): Continue Skilled Therapy and Home Health: Physical Therapy. HISTORY:   History of Present Injury/Illness (Reason for Referral):  S/p left tka  Past Medical History/Comorbidities:   Ms. Tatyana Edmonds  has a past medical history of Asthma with COPD ([facility] [address] CAD (coronary artery disease); Environmental and seasonal allergies; H/O echocardiogram (11/14/2016); History of nuclear stress test (12/04/2017); Graves' disease; Hyperlipidemia (11/16/2012); Hypertension; Menopause; Obesity (BMI 30-39.9); Osteoarthritis; Routine health maintenance (11/16/2012); S/P PTCA (percutaneous transluminal coronary angioplasty) (2012); Shortness of breath (05/10/2016); Status post total left knee replacement (4/23/2018); Status post total right knee replacement (2/1/2016); Thyroid disease; Tremor (11/16/2012); and Ulcerative colitis ([facility] [address] (11/7/2013). She also has no past medical history of Adverse effect of anesthesia; Difficult intubation; Malignant hyperthermia due to anesthesia; Nausea & vomiting; Pseudocholinesterase deficiency; or Unspecified adverse effect of anesthesia.   Ms. Tatyana Edmonds  has a past surgical history that includes hx lap cholecystectomy (2002); hx hysterectomy (1983); hx heent (1981); hx tonsil and adenoidectomy (1493); hx heent; hx ptca (2012); hx knee replacement (Right, 02/01/2016); hx cataract removal (Left, 08/15/2016); and hx ankle fracture tx (Right, 1956). Social History/Living Environment:   Home Environment: Private residence  # Steps to Enter: 1  Rails to Enter: Yes  One/Two Story Residence: One story  Living Alone: No  Support Systems: Family member(s), Assisted living  Patient Expects to be Discharged to[de-identified] Private residence  Current DME Used/Available at Home: None  Tub or Shower Type: Shower  Prior Level of Function/Work/Activity:  independent   Number of Personal Factors/Comorbidities that affect the Plan of Care: 1-2: MODERATE COMPLEXITY   EXAMINATION:   Most Recent Physical Functioning:                 LLE AROM  L Knee Flexion: 70  L Knee Extension: 5     LLE Strength  L Knee Flexion: 3  L Knee Extension: 3         Transfers  Sit to Stand: Stand-by assistance  Stand to Sit: Stand-by assistance    Balance  Sitting: Intact  Standing: With support              Weight Bearing Status  Left Side Weight Bearing: As tolerated  Distance (ft): 164 Feet (ft)  Ambulation - Level of Assistance: Stand-by assistance  Assistive Device: Walker, rolling  Speed/Lupe: Delayed  Step Length: Left shortened;Right shortened  Stance: Left decreased  Gait Abnormalities: Antalgic  Number of Stairs Trained: 3 (also one step with walker)  Stairs - Level of Assistance: Contact guard assistance  Rail Use: Both  Interventions: Safety awareness training;Verbal cues     Braces/Orthotics:     Left Knee Cold  Type: Cryocuff      Body Structures Involved:  1. Bones  2. Joints  3. Muscles  4. Ligaments Body Functions Affected:  1. Movement Related Activities and Participation Affected:  1.  Mobility   Number of elements that affect the Plan of Care: 3: MODERATE COMPLEXITY   CLINICAL PRESENTATION:   Presentation: Stable and uncomplicated: LOW COMPLEXITY   CLINICAL DECISION MAKIN[address] [place] [id number] AM-PAC 6 Clicks   Basic Mobility Inpatient Short Form  How much difficulty does the patient currently have. .. Unable A Lot A Little None   1. Turning over in bed (including adjusting bedclothes, sheets and blankets)? [] 1   [] 2   [x] 3   [] 4   2. Sitting down on and standing up from a chair with arms ( e.g., wheelchair, bedside commode, etc.)   [] 1   [] 2   [x] 3   [] 4   3. Moving from lying on back to sitting on the side of the bed? [] 1   [] 2   [x] 3   [] 4   How much help from another person does the patient currently need. .. Total A Lot A Little None   4. Moving to and from a bed to a chair (including a wheelchair)? [] 1   [] 2   [x] 3   [] 4   5. Need to walk in hospital room? [] 1   [x] 2   [] 3   [] 4   6. Climbing 3-5 steps with a railing? [] 1   [x] 2   [] 3   [] 4   © 2007, Trustees of [address] [place] [address] [address] [address], under license to [employer]. All rights reserved        Score:  Initial: 16 Most Recent: X (Date: -- )    Interpretation of Tool:  Represents activities that are increasingly more difficult (i.e. Bed mobility, Transfers, Gait). Score 24 23 22-20 19-15 14-10 9-7 6     Modifier CH CI CJ CK CL CM CN      ? Mobility - Walking and Moving Around:     - CURRENT STATUS: CK - 40%-59% impaired, limited or restricted    - GOAL STATUS: CJ - 20%-39% impaired, limited or restricted    - D/C STATUS:  ---------------To be determined---------------  Payor: [employer]IC[employer] COMPLETE / Plan: [address] / Product Type: Managed Care Medicare /      Medical Necessity:     · Patient is expected to demonstrate progress in strength, range of motion and balance to decrease assistance required with theraputic exercises and functional mobility.   Reason for Services/Other Comments:  · Patient continues to require present interventions due to patient's inability to perform theraputic exercises and functional mobility independently. Use of outcome tool(s) and clinical judgement create a POC that gives a: Clear prediction of patient's progress: LOW COMPLEXITY            TREATMENT:   (In addition to Assessment/Re-Assessment sessions the following treatments were rendered)     Pre-treatment Symptoms/Complaints:  Knee pain  Pain: Initial:      Post Session:  Did not rate     Gait Training (15 Minutes):  Gait training to improve and/or restore physical functioning as related to mobility, strength and balance. Ambulated 164 Feet (ft) with Stand-by assistance using a Walker, rolling and minimal Safety awareness training;Verbal cues related to their stance phase to promote proper body alignment, promote proper body posture and promote proper body mechanics. Therapeutic Exercise: (45 Minutes):  Exercises per grid below to improve mobility and strength. Required minimal visual, verbal and manual cues to promote proper body alignment, promote proper body posture and promote proper body mechanics. Progressed range and repetitions as indicated. Date:  4/24 Date:  4/25 Date:     ACTIVITY/EXERCISE AM PM AM PM AM PM   GROUP THERAPY  []  [x]  [x]  []  []  []   Ankle Pumps 15a 15a 20a      Quad Sets 10a 15a 20a      Gluteal Sets 10a 15a 20a      Hip ABd/ADduction 10a 15a 20a      Straight Leg Raises 10aa 15a 20a      Knee Slides 10aa 15aa 20aa      Short Arc Quads  15a 20a      Long Arc Quads         Chair Slides  15a 20a               B = bilateral; AA = active assistive; A = active; P = passive      Treatment/Session Assessment:     Response to Treatment:  Pt.making progress.     Education:  [x] Home Exercises  [x] Fall Precautions  [] Hip Precautions [x] D/C Instruction Review  [x] Knee/Hip Prosthesis Review  [x] Walker Management/Safety [] Adaptive Equipment as Needed       Interdisciplinary Collaboration:   o Physical Therapist  o Physical Therapy Assistant  o Rehabilitation Attendant    After treatment position/precautions:   o Up in chair  o Bed/Chair-wheels locked  o Bed in low position  o Caregiver at bedside  o Call light within reach  o Family at bedside    Compliance with Program/Exercises: yes. No questions. Recommendations/Intent for next treatment session:  Treatment next visit will focus on increasing Ms. Maria G Kappa independence with bed mobility, transfers, gait training, strength/ROM exercises, modalities for pain, and patient education.       Total Treatment Duration:  PT Patient Time In/Time Out  Time In: 1030  Time Out: [facility],

## 2018-04-25 NOTE — PROGRESS NOTES
Care Management Interventions  Mode of Transport at Discharge: Self  Transition of Care Consult (CM Consult): [address]: Yes  Discharge Durable Medical Equipment: No  Physical Therapy Consult: Yes  Occupational Therapy Consult: Yes  Current Support Network: Lives with Spouse  Confirm Follow Up Transport: Family  Plan discussed with Pt/Family/Caregiver: Yes  Freedom of Choice Offered: Yes  Discharge Location  Discharge Placement: Home with home health    Patient is a 77y.o. year old female admitted for Left TKA . Patient lives with Her spouse and plans to return home on discharge. Order received to arrange home health. Patient without preference towards agency. Referral sent to [facility]. Patient denies any equipment needs as she has a walker and raised toilet seat. Will follow until discharge.   Angelica Foster

## 2018-04-25 NOTE — DISCHARGE INSTRUCTIONS
[id number] [facility]   Patient Discharge Instructions    Anju Jurado / [id number] : 1940    Admitted 2018 Discharged: 2018     IF YOU HAVE ANY PROBLEMS ONCE YOU ARE AT HOME CALL THE FOLLOWING NUMBERS:   Main office number: [phone]      Medications    · The medications you are to continue on are listed on the medication reconciliation sheet. · Narcotic pain medications as well as supplemental iron can cause constipation. If this occurs try stopping the narcotic pain medication and/or the iron. · It is important that you take the medication exactly as they are prescribed. · Medications which increase your risk of blood clots are listed to stop for 5 weeks after surgery as well as medications or supplements which increase your risk of bleeding complications. · Keep your medication in the bottles provided by the pharmacist and keep a list of the medication names, dosages, and times to be taken in your wallet. · Do not take other medications without consulting your doctor. Important Information    Do NOT smoke as this will greatly increase your risk of infection! Resume your prehospital diet. If you have excessive nausea or vomitting call your doctor's office     Leg swelling and warmth is normal for 6 months after surgery. If you experience swelling in your leg elevate you leg while laying down with your toes above your heart. If you have sudden onset severe swelling with leg pain call our office. The stitches deep inside take approximately 6 months to dissolve. There will be sharp shooting, stinging and burning pain. This is normal and will resolve between 3-6 months after surgery. Difficulty sleeping is normal following total Knee and Hip replacement. You may try melatonin, an over-the-counter sleep aid or benadryl to help with sleep. Most patients will resume sleeping through the night 8 weeks after surgery. Home Physical Therapy is arranged.  Home [facility] will contact you within 48 hrs of discharge that you have chosen. If you have not received a call within this time frame please contact that provider you chose. You should be given this information before you leave the hospital.     You are at a risk for falls. Use the rolling walker when walking. Patients who have had a joint replacement should not drive if they are still taking narcotic pain mediation during the daytime hours. Most patients wean themselves off of pain medication within 2-5 weeks after surgery. When to Call the office    - If you have a temperature greater then 101  - Uncontrolled vomiting   - Loose control of your bladder or bowel function  - Are unable to bear any weight   - Need a pain medication refill       DISCHARGE SUMMARY from Nurse    The following personal items collected during your admission are returned to you:   Dental Appliance: Dental Appliances: None  Vision: Visual Aid: Glasses  Hearing Aid:   na  Jewelry: Jewelry: Ring, Earrings  Clothing:   self  Other Valuables: Other Valuables: Cell Phone (credit card and all valuables sent with [employer])  Valuables sent to safe:   na    PATIENT INSTRUCTIONS:    After general anesthesia or intravenous sedation, for 24 hours or while taking prescription Narcotics:  · Limit your activities  · Do not drive and operate hazardous machinery  · Do not make important personal or business decisions  · Do  not drink alcoholic beverages  · If you have not urinated within 8 hours after discharge, please contact your surgeon on call.     Report the following to your surgeon:  · Excessive pain, swelling, redness or odor of or around the surgical area  · Temperature over 101  · Nausea and vomiting lasting longer than 4 hours or if unable to take medications  · Any signs of decreased circulation or nerve impairment to extremity: change in color, persistent  numbness, tingling, coldness or increase pain  · Any questions, call office @ [phone]      Keep scheduled follow up appointment. If need to change, call office @ [phone]. *  Please give a list of your current medications to your Primary Care Provider. *  Please update this list whenever your medications are discontinued, doses are      changed, or new medications (including over-the-counter products) are added. *  Please carry medication information at all times in case of emergency situations. Total Knee Replacement: What to Expect at [address] [place] [address]    When you leave the hospital, you should be able to move around with a walker or crutches. But you will need someone to help you at home for the next few weeks or until you have more energy and can move around better. If there is no one to help you at home, you may go to a rehabilitation center. You will go home with a bandage and stitches or staples. Change the bandage as your doctor tells you to. Your doctor will remove your stitches or staples 10 to 21 days after your surgery. You may still have some mild pain, and the area may be swollen for 3 to 6 months after surgery. Your knee will continue to improve for 6 to 12 months. You will probably use a walker for 1 to 3 weeks and then use crutches. When you are ready, you can use a cane. You will probably be able to walk on your own in 4 to 8 weeks. You will need to do months of physical rehabilitation (rehab) after a knee replacement. Rehab will help you strengthen the muscles of the knee and help you regain movement. After you recover, your artificial knee will allow you to do normal daily activities with less pain or no pain at all. You may be able to hike, dance, ride a bike, and play golf. Talk to your doctor about whether you can do more strenuous activities. Always tell your caregivers that you have an artificial knee.   How long it will take to walk on your own, return to normal activities, and go back to work depends on your health and how well your rehabilitation (rehab) program goes. The better you do with your rehab exercises, the quicker you will get your strength and movement back. This care sheet gives you a general idea about how long it will take for you to recover. But each person recovers at a different pace. Follow the steps below to get better as quickly as possible. How can you care for yourself at home? Activity  ? · Rest when you feel tired. You may take a nap, but do not stay in bed all day. When you sit, use a chair with arms. You can use the arms to help you stand up. ? · Work with your physical therapist to find the best way to exercise. You may be able to take frequent, short walks using crutches or a walker. What you can do as your knee heals will depend on whether your new knee is cemented or uncemented. You may not be able to do certain things for a while if your new knee is uncemented. ? · After your knee has healed enough, you can do more strenuous activities with caution. ¨ You can golf, but use a golf cart, and do not wear shoes with spikes. ¨ You can bike on a flat road or on a stationary bike. Avoid biking up hills. ¨ Your doctor may suggest that you stay away from activities that put stress on your knee. These include tennis or badminton, squash or racquetball, contact sports like football, jumping (such as in basketball), jogging, or running. ¨ Avoid activities where you might fall. These include horseback riding, skiing, and mountain biking. ? · Do not sit for more than 1 hour at a time. Get up and walk around for a while before you sit again. If you must sit for a long time, prop up your leg with a chair or footstool. This will help you avoid swelling. ? · Ask your doctor when you can shower. You may need to take sponge baths until your stitches or staples have been removed. ? · Ask your doctor when you can drive again. It may take up to 8 weeks after knee replacement surgery before it is safe for you to drive. ? · When you get into a car, sit on the edge of the seat. Then pull in your legs, and turn to face the front. ? · You should be able to do many everyday activities 3 to 6 weeks after your surgery. You will probably need to take 4 to 16 weeks off from work. When you can go back to work depends on the type of work you do and how you feel. ? · Ask your doctor when it is okay for you to have sex. ? · Do not lift anything heavier than 10 pounds and do not lift weights for 12 weeks. Diet  ? · By the time you leave the hospital, you should be eating your normal diet. If your stomach is upset, try bland, low-fat foods like plain rice, broiled chicken, toast, and yogurt. Your doctor may suggest that you take iron and vitamin supplements. ? · Drink plenty of fluids (unless your doctor tells you not to). ? · Eat healthy foods, and watch your portion sizes. Try to stay at your ideal weight. Too much weight puts more stress on your new knee. ? · You may notice that your bowel movements are not regular right after your surgery. This is common. Try to avoid constipation and straining with bowel movements. You may want to take a fiber supplement every day. If you have not had a bowel movement after a couple of days, ask your doctor about taking a mild laxative. Medicines  ? · Your doctor will tell you if and when you can restart your medicines. He or she will also give you instructions about taking any new medicines. ? · If you take blood thinners, such as warfarin (Coumadin), clopidogrel (Plavix), or aspirin, be sure to talk to your doctor. He or she will tell you if and when to start taking those medicines again. Make sure that you understand exactly what your doctor wants you to do.   ? · Your doctor may give you a blood-thinning medicine to prevent blood clots. If you take a blood thinner, be sure you get instructions about how to take your medicine safely. Blood thinners can cause serious bleeding problems. This medicine could be in pill form or as a shot (injection). If a shot is necessary, your doctor will tell you how to do this. ? · Be safe with medicines. Take pain medicines exactly as directed. ¨ If the doctor gave you a prescription medicine for pain, take it as prescribed. ¨ If you are not taking a prescription pain medicine, ask your doctor if you can take an over-the-counter medicine. ¨ Plan to take your pain medicine 30 minutes before exercises. It is easier to prevent pain before it starts than to stop it once it has started. ? · If you think your pain medicine is making you sick to your stomach:  ¨ Take your medicine after meals (unless your doctor has told you not to). ¨ Ask your doctor for a different pain medicine. ? · If your doctor prescribed antibiotics, take them as directed. Do not stop taking them just because you feel better. You need to take the full course of antibiotics. Incision care  ? · You will have a bandage over the cut (incision) and staples or stitches. Take the bandage off when your doctor says it is okay. ? · Your doctor will remove the staples or stitches 10 days to 3 weeks after the surgery and replace them with strips of tape. Leave the tape on for a week or until it falls off. Exercise  ? · Your rehab program will give you a number of exercises to do to help you get back your knee's range of motion and strength. Always do them as your therapist tells you. Ice and elevation  ? · For pain and swelling, put ice or a cold pack on the area for 10 to 20 minutes at a time. Put a thin cloth between the ice and your skin. Other instructions  ? · Continue to wear your support stockings as your doctor says. These help to prevent blood clots. The length of time that you will have to wear them depends on your activity level and the amount of swelling. ? · You have metal pieces in your knee. These may set off some airport metal detectors.  Carry a medical alert card that says you have an artificial joint, just in case. Follow-up care is a key part of your treatment and safety. Be sure to make and go to all appointments, and call your doctor if you are having problems. It's also a good idea to know your test results and keep a list of the medicines you take. When should you call for help? Call 911 anytime you think you may need emergency care. For example, call if:  ? · You passed out (lost consciousness). ? · You have severe trouble breathing. ? · You have sudden chest pain and shortness of breath, or you cough up blood. ?Call your doctor now or seek immediate medical care if:  ? · You have signs of infection, such as:  ¨ Increased pain, swelling, warmth, or redness. ¨ Red streaks leading from the incision. ¨ Pus draining from the incision. ¨ A fever. ? · You have signs of a blood clot, such as:  ¨ Pain in your calf, back of the knee, thigh, or groin. ¨ Redness and swelling in your leg or groin. ? · Your incision comes open and begins to bleed, or the bleeding increases. ? · You have pain that does not get better after you take pain medicine. ? Watch closely for changes in your health, and be sure to contact your doctor if:  ? · You do not have a bowel movement after taking a laxative. Where can you learn more? Go to http://liza-shannan.info/. Enter D335 in the search box to learn more about \"Total Knee Replacement: What to Expect at Home. \"  Current as of: March 21, 2017  Content Version: 11.4  © 0618-1088 [employer]. Care instructions adapted under license by [employer] (which disclaims liability or warranty for this information). If you have questions about a medical condition or this instruction, always ask your healthcare professional. [address] any warranty or liability for your use of this information.         These are general instructions for a healthy lifestyle:    No smoking/ No tobacco products/ Avoid exposure to second hand smoke    Surgeon General's Warning:  Quitting smoking now greatly reduces serious risk to your health. Obesity, smoking, and sedentary lifestyle greatly increases your risk for illness    A healthy diet, regular physical exercise & weight monitoring are important for maintaining a healthy lifestyle    You may be retaining fluid if you have a history of heart failure or if you experience any of the following symptoms:  Weight gain of 3 pounds or more overnight or 5 pounds in a week, increased swelling in our hands or feet or shortness of breath while lying flat in bed. Please call your doctor as soon as you notice any of these symptoms; do not wait until your next office visit. Recognize signs and symptoms of STROKE:    F-face looks uneven    A-arms unable to move or move even    S-speech slurred or non-existent    T-time-call 911 as soon as signs and symptoms begin-DO NOT go       Back to bed or wait to see if you get better-TIME IS BRAIN. The discharge information has been reviewed with the patient. The patient verbalized understanding. Information obtained by :  I understand that if any problems occur once I am at home I am to contact my physician. I understand and acknowledge receipt of the instructions indicated above.                                                                                                                                            Physician's or R.N.'s Signature                                                                  Date/Time                                                                                                                                              Patient or Representative Signature                                                          Date/Time

## 2018-04-25 NOTE — PROGRESS NOTES
[address]  Face to Face Encounter    Patients Name: Star Valadez    YOB: 1940    Ordering Physician: Quita Moctezuma    Primary Diagnosis: Primary localized osteoarthritis of left knee [M17.12]  S/p left TKA    Date of Face to Face:   4-23-18                                Face to Face Encounter findings are related to primary reason for home care:   yes. 1. I certify that the patient needs intermittent care as follows: physical therapy: gait/stair training    2. I certify that this patient is homebound, that is: 1) patient requires the use of a walker device, special transportation, or assistance of another to leave the home; or 2) patient's condition makes leaving the home medically contraindicated; and 3) patient has a normal inability to leave the home and leaving the home requires considerable and taxing effort. Patient may leave the home for infrequent and short duration for medical reasons, and occasional absences for non-medical reasons. Homebound status is due to the following functional limitations: Patient's ambulation limited secondary to severe pain and requires the use of an assistive device and the assistance of a caregiver for safe completion. Patient with strength and ROM deficits limiting ambulation endurance requiring the use of an assistive device and the assistance of a caregiver. Patient deemed temporarily homebound secondary to increased risk for infection when leaving home and going out into the community. 3. I certify that this patient is under my care and that I, or a nurse practitioner or  [id number], or clinical nurse specialist, or certified nurse midwife, working with me, had a Face-to-Face Encounter that meets the physician Face-to-Face Encounter requirements.   The following are the clinical findings from the [address] [place] [address] encounter that support the need for skilled services and is a summary of the encounter: see hospital chart    Davy Bhavna Greyson Plan, [address]  4/24/2018      THE FOLLOWING TO BE COMPLETED BY THE COMMUNITY PHYSICIAN:    I concur with the findings described above from the F2F encounter that this patient is homebound and in need of a skilled service.     Certifying Physician: _____________________________________      Printed Certifying Physician Name: _____________________________________    Date: _________________

## 2018-04-25 NOTE — PROGRESS NOTES
zofran 4 mg iv for c/o nausea. Dilaudid 2 mg po for c/o pain at 10. Has just been to bathroom. Has  Small spot of serosang. Drainage on lt. Knee dressing. neuro vas. cks good to both feet.

## 2018-04-26 ENCOUNTER — HOME CARE VISIT (OUTPATIENT)
Dept: SCHEDULING | Facility: HOME HEALTH | Age: 78
End: 2018-04-26
Payer: MEDICARE

## 2018-04-26 VITALS
SYSTOLIC BLOOD PRESSURE: 113 MMHG | TEMPERATURE: 98.3 F | HEART RATE: 65 BPM | DIASTOLIC BLOOD PRESSURE: 69 MMHG | RESPIRATION RATE: 18 BRPM

## 2018-04-26 PROCEDURE — G0151 HHCP-SERV OF PT,EA 15 MIN: HCPCS

## 2018-04-26 PROCEDURE — 400013 HH SOC

## 2018-04-30 ENCOUNTER — HOME CARE VISIT (OUTPATIENT)
Dept: SCHEDULING | Facility: HOME HEALTH | Age: 78
End: 2018-04-30
Payer: MEDICARE

## 2018-04-30 VITALS
TEMPERATURE: 98.7 F | SYSTOLIC BLOOD PRESSURE: 148 MMHG | DIASTOLIC BLOOD PRESSURE: 72 MMHG | HEART RATE: 80 BPM | RESPIRATION RATE: 18 BRPM

## 2018-04-30 PROCEDURE — G0151 HHCP-SERV OF PT,EA 15 MIN: HCPCS

## 2018-05-02 ENCOUNTER — HOME CARE VISIT (OUTPATIENT)
Dept: SCHEDULING | Facility: HOME HEALTH | Age: 78
End: 2018-05-02
Payer: MEDICARE

## 2018-05-02 VITALS
TEMPERATURE: 98 F | SYSTOLIC BLOOD PRESSURE: 140 MMHG | HEART RATE: 65 BPM | RESPIRATION RATE: 16 BRPM | DIASTOLIC BLOOD PRESSURE: 66 MMHG

## 2018-05-02 PROCEDURE — G0151 HHCP-SERV OF PT,EA 15 MIN: HCPCS

## 2018-05-04 ENCOUNTER — HOME CARE VISIT (OUTPATIENT)
Dept: SCHEDULING | Facility: HOME HEALTH | Age: 78
End: 2018-05-04
Payer: MEDICARE

## 2018-05-04 VITALS
RESPIRATION RATE: 16 BRPM | HEART RATE: 66 BPM | SYSTOLIC BLOOD PRESSURE: 147 MMHG | DIASTOLIC BLOOD PRESSURE: 70 MMHG | TEMPERATURE: 98.6 F

## 2018-05-04 PROCEDURE — G0151 HHCP-SERV OF PT,EA 15 MIN: HCPCS

## 2018-05-07 ENCOUNTER — HOME CARE VISIT (OUTPATIENT)
Dept: SCHEDULING | Facility: HOME HEALTH | Age: 78
End: 2018-05-07
Payer: MEDICARE

## 2018-05-07 PROCEDURE — A4649 SURGICAL SUPPLIES: HCPCS

## 2018-05-07 PROCEDURE — G0157 HHC PT ASSISTANT EA 15: HCPCS

## 2018-05-08 VITALS
SYSTOLIC BLOOD PRESSURE: 138 MMHG | RESPIRATION RATE: 18 BRPM | HEART RATE: 76 BPM | DIASTOLIC BLOOD PRESSURE: 78 MMHG | TEMPERATURE: 96.1 F

## 2018-05-10 ENCOUNTER — HOME CARE VISIT (OUTPATIENT)
Dept: SCHEDULING | Facility: HOME HEALTH | Age: 78
End: 2018-05-10
Payer: MEDICARE

## 2018-05-10 PROCEDURE — G0157 HHC PT ASSISTANT EA 15: HCPCS

## 2018-05-11 VITALS
RESPIRATION RATE: 16 BRPM | HEART RATE: 72 BPM | TEMPERATURE: 97.1 F | SYSTOLIC BLOOD PRESSURE: 132 MMHG | DIASTOLIC BLOOD PRESSURE: 78 MMHG

## 2018-05-14 ENCOUNTER — HOME CARE VISIT (OUTPATIENT)
Dept: SCHEDULING | Facility: HOME HEALTH | Age: 78
End: 2018-05-14
Payer: MEDICARE

## 2018-05-14 VITALS
TEMPERATURE: 96.8 F | HEART RATE: 76 BPM | SYSTOLIC BLOOD PRESSURE: 124 MMHG | DIASTOLIC BLOOD PRESSURE: 60 MMHG | RESPIRATION RATE: 16 BRPM

## 2018-05-14 PROCEDURE — G0157 HHC PT ASSISTANT EA 15: HCPCS

## 2018-05-16 ENCOUNTER — HOME CARE VISIT (OUTPATIENT)
Dept: SCHEDULING | Facility: HOME HEALTH | Age: 78
End: 2018-05-16
Payer: MEDICARE

## 2018-05-16 VITALS
HEART RATE: 68 BPM | SYSTOLIC BLOOD PRESSURE: 142 MMHG | DIASTOLIC BLOOD PRESSURE: 76 MMHG | RESPIRATION RATE: 16 BRPM | TEMPERATURE: 98.5 F

## 2018-05-16 PROCEDURE — G0151 HHCP-SERV OF PT,EA 15 MIN: HCPCS

## 2018-05-23 ENCOUNTER — HOSPITAL ENCOUNTER (OUTPATIENT)
Dept: PHYSICAL THERAPY | Age: 78
Discharge: HOME | End: 2018-05-23
Payer: MEDICARE

## 2018-05-23 PROCEDURE — G8978 MOBILITY CURRENT STATUS: HCPCS

## 2018-05-23 PROCEDURE — 97110 THERAPEUTIC EXERCISES: CPT

## 2018-05-23 PROCEDURE — G8979 MOBILITY GOAL STATUS: HCPCS

## 2018-05-23 PROCEDURE — 97162 PT EVAL MOD COMPLEX 30 MIN: CPT

## 2018-05-23 NOTE — PROGRESS NOTES
Ambulatory/Rehab Services H2 Model Falls Risk Assessment    Risk Factor Pts. ·   Confusion/Disorientation/Impulsivity  []    4 ·   Symptomatic Depression  []   2 ·   Altered Elimination  []   1 ·   Dizziness/Vertigo  []   1 ·   Gender (Male)  []   1 ·   Any administered antiepileptics (anticonvulsants):  []   2 ·   Any administered benzodiazepines:  []   1 ·   Visual Impairment (specify):  []   1 ·   Portable Oxygen Use  []   1 ·   Orthostatic ? BP  []   1 ·   History of Recent Falls (within 3 mos.)  []   5     Ability to Rise from Chair (choose one) Pts. ·   Ability to rise in a single movement  []   0 ·   Pushes up, successful in one attempt  []   1 ·   Multiple attempts, but successful  [x]   3 ·   Unable to rise without assistance  []   4   Total: (5 or greater = High Risk) 3     Falls Prevention Plan:   []                Physical Limitations to Exercise (specify):   []                Mobility Assistance Device (type):   []                Exercise/Equipment Adaptation (specify):    ©2010 [facility] [address] Patent #[id number].  Federal Law prohibits the replication, distribution or use without written permission from [facility] [place]

## 2018-05-23 NOTE — THERAPY EVALUATION
Mil Schwab  : 1940  Payor: Devon Olvera / Plan: [employer] MEDICARE COMPLETE / Product Type: Managed Care Medicare /  [address]  at [facility]  [address], [address]  [phone]   [phone]      OUTPATIENT PHYSICAL THERAPY:Initial Assessment 2018    ICD-10: Treatment Diagnosis:   R26.2 Difficulty in walking, not elsewhere classified     M25.662 Stiffness of left knee, not elsewhere classified     M25.562 Pain in left knee   Precautions/Allergies:   Review of patient's allergies indicates no known allergies. Fall Risk Score: 3 (? 5 = High Risk)  MD Orders: Evaluate and treat MEDICAL/REFERRING DIAGNOSIS:  Presence of left artificial knee joint [Z96.652]   DATE OF ONSET: 2018  REFERRING PHYSICIAN: Cindy Red., *  RETURN PHYSICIAN APPOINTMENT: TBD     INITIAL ASSESSMENT:  Ms. Kushal Dickinson reported a L TKA on 2018 and has been receiving home physical therapy. She has been referred to outpatient physical therapy for continued rehabilitation. She ambualtes with a cane a L LE limp. PROBLEM LIST (Impacting functional limitations):  1. Decreased Strength  2. Decreased ADL/Functional Activities  3. Increased Pain  4. Decreased Activity Tolerance INTERVENTIONS PLANNED:  1. Home Exercise Program (HEP)  2. Manual Therapy  3. Neuromuscular Re-education/Strengthening  4. Range of Motion (ROM)  5. Therapeutic Exercise/Strengthening   TREATMENT PLAN:  Effective Dates: 2018 TO 2018. Frequency/Duration: 2 times a week for 90 days  GOALS: (Goals have been discussed and agreed upon with patient.)  SHORT-TERM FUNCTIONAL GOALS: Time Frame: 30 days  1. Increase LEFS 4 points to decrease pain 1-2 levels. 2. Increase L knee AROM 10 degrees to decrease pain 1-2 levels. 3. Increase L LE strength to allow initiation of stairs. DISCHARGE GOALS: Time Frame: 90 days  1.  Increase LEFS 9 points to decrease pain +2 levels. 2. Increase l LE strength to allow reciprocal stairs. 3. Demonstrate independence in home exercises to allow DC from physical therapy. Rehabilitation Potential For Stated Goals: Good  Regarding [de-identified] M Declanandree's therapy, I certify that the treatment plan above will be carried out by a therapist or under their direction. Thank you for this referral,  Sayda Amato., PT     Referring Physician Signature: Mt Wong., *              Date                    The information in this section was collected on 5/23/2018 (except where otherwise noted). HISTORY:   History of Present Injury/Illness (Reason for Referral): The patient reported a L TKA on April 23, 2018 and has been receiving home physical therapy. She has been referred to outpatient physical therapy for continued rehabilitation. She ambualtes with a cane a L LE limp. Past Medical History/Comorbidities:   Ms. Arlene Zarate  has a past medical history of Asthma with COPD ([facility] [address] CAD (coronary artery disease); Environmental and seasonal allergies; H/O echocardiogram (11/14/2016); History of nuclear stress test (12/04/2017); Graves' disease; Hyperlipidemia (11/16/2012); Hypertension; Menopause; Obesity (BMI 30-39.9); Osteoarthritis; Routine health maintenance (11/16/2012); S/P PTCA (percutaneous transluminal coronary angioplasty) (2012); Shortness of breath (05/10/2016); Status post total left knee replacement (4/23/2018); Status post total right knee replacement (2/1/2016); Thyroid disease; Tremor (11/16/2012); and Ulcerative colitis ([facility] [address] (11/7/2013). She also has no past medical history of Adverse effect of anesthesia; Difficult intubation; Malignant hyperthermia due to anesthesia; Nausea & vomiting; Pseudocholinesterase deficiency; or Unspecified adverse effect of anesthesia.   Ms. Arlene Zarate  has a past surgical history that includes hx lap cholecystectomy (2002); hx hysterectomy (1983); hx heent (1981); hx tonsil and adenoidectomy (1959); hx heent; hx ptca (2012); hx knee replacement (Right, 02/01/2016); hx cataract removal (Left, 08/15/2016); and hx ankle fracture tx (Right, 1956). Social History/Living Environment:      Prior Level of Function/Work/Activity:  Retired  Dominant Side:         RIGHT  Current Medications:       Current Outpatient Prescriptions:     docusate sodium (COLACE) 100 mg capsule, Take 100 mg by mouth three (3) times daily as needed for Constipation. , Disp: , Rfl:     DULCOLAX, BISACODYL, PO, Take 100 mg by mouth as needed (constipation). , Disp: , Rfl:     aspirin delayed-release 81 mg tablet, Take 1 Tab by mouth every twelve (12) hours every twelve (12) hours for 35 days. , Disp: 70 Tab, Rfl: 0    HYDROmorphone (DILAUDID) 2 mg tablet, Take 1 Tab by mouth every four (4) hours as needed. Max Daily Amount: 12 mg., Disp: 40 Tab, Rfl: 0    acetaminophen (TYLENOL EXTRA STRENGTH) 500 mg tablet, Take  by mouth every six (6) hours as needed for Pain., Disp: , Rfl:     cholecalciferol, vitamin D3, (VITAMIN D3) 2,000 unit tab, Take  by mouth daily. , Disp: , Rfl:     losartan (COZAAR) 100 mg tablet, TAKE 1 TABLET BY MOUTH  DAILY FOR HYPERTENSION, Disp: 90 Tab, Rfl: 3    levothyroxine (SYNTHROID) 137 mcg tablet, Take 137 mcg by mouth Daily (before breakfast). , Disp: 90 Tab, Rfl: 3    metoprolol succinate (TOPROL-XL) 25 mg XL tablet, Take 1 Tab by mouth daily. , Disp: 90 Tab, Rfl: 3    simvastatin (ZOCOR) 40 mg tablet, Take 1 Tab by mouth nightly. (Patient taking differently: Take 40 mg by mouth daily.), Disp: 90 Tab, Rfl: 3    albuterol (PROVENTIL HFA, VENTOLIN HFA, PROAIR HFA) 90 mcg/actuation inhaler, Take 2 Puffs by inhalation every four (4) hours as needed for Wheezing., Disp: 3 Inhaler, Rfl: 3    fluticasone (FLONASE) 50 mcg/actuation nasal spray, 2 Sprays by Both Nostrils route daily. , Disp: 3 Bottle, Rfl: 3    multivitamin (DAILY MULTIPLE) tablet, Take 1 Tab by mouth daily.  Hold 7 days prior to surgery per anesthesia protocol, Disp: , Rfl:    Date Last Reviewed:  5/23/2018   Number of Personal Factors/Comorbidities that affect the Plan of Care: 1-2: MODERATE COMPLEXITY   EXAMINATION:     Observation/Orthostatic Postural Assessment:   Ambulation with a cane and L LE limp. Palpation:   Tenderness around her L knee incision. ROM: AROM : KNEE    R        L   Extension          0        0   Flexion         +125   +100                    Strength:    +3/5 L LE Strength   +4/5 R LR Strength           Special Tests: N/A  Neurological Screen: N/A  Functional Mobility: Independent with a  cane and L LE limp. Balance:  Good. Body Structures Involved:  1. Joints  2. Muscles Body Functions Affected:  1. Sensory/Pain  2. Neuromusculoskeletal  3. Movement Related Activities and Participation Affected:  1. General Tasks and Demands  2. Mobility  3. Community, Social and [place]   Number of elements (examined above) that affect the Plan of Care: 3: MODERATE COMPLEXITY   CLINICAL PRESENTATION:   Presentation: Evolving clinical presentation with changing clinical characteristics: MODERATE COMPLEXITY   CLINICAL DECISION MAKING:   Outcome Measure: Tool Used: Lower Extremity Functional Scale (LEFS)  Score:  Initial: 45/80 Most Recent: X/80 (Date: -- )   Interpretation of Score: 20 questions each scored on a 5 point scale with 0 representing \"extreme difficulty or unable to perform\" and 4 representing \"no difficulty\". The lower the score, the greater the functional disability. 80/80 represents no disability. Minimal detectable change is 9 points. Score 80 79-63 62-48 47-32 31-16 15-1 0   Modifier CH CI CJ CK CL CM CN     ?  Mobility - Walking and Moving Around:     - CURRENT STATUS: CK - 40%-59% impaired, limited or restricted    - GOAL STATUS: CJ - 20%-39% impaired, limited or restricted    - D/C STATUS:  ---------------To be determined---------------    Medical Necessity:   · Patient demonstrates good rehab potential due to higher previous functional level. Reason for Services/Other Comments:  · Patient continues to require skilled intervention due to her limited ability to ambulate with a normal gait pattern and perform ADLs in weight bearing. .   Use of outcome tool(s) and clinical judgement create a POC that gives a: Questionable prediction of patient's progress: MODERATE COMPLEXITY            TREATMENT:   (In addition to Assessment/Re-Assessment sessions the following treatments were rendered)  Pre-treatment Symptoms/Complaints:  The patient reported a L TKA on April 23, 2018 and has been receiving home physical therapy. She has been referred to outpatient physical therapy for continued rehabilitation. She ambualtes with a cane a L LE limp. Pain: Initial:   Pain Intensity 1: 5  Post Session:  5   Therapeutic Exercise: ( 30): The patient received treatment as below to improve mobility, strength and balance. Required moderate verbal and manual cues to promote proper body alignment, promote proper body posture and promote proper body mechanics. Progressed resistance, range and repetitions as indicated. The patient received exercise instruction followed by patient demonstration of the exercises below along with hamstring and heel cord stretching to stabilize the patient's LE and improve AROM to decrease spasms, pain, and improve function.    Date:  5/23/2018 Date:   Date:     Activity/Exercise Parameters Parameters Parameters   MAT  EXERCISES:      Hip ER / Abduction      Hip Adduction (Ball Squeeze)      Bridging      Straight Leg Raise      SAQ      Hip Abduction (Sidelying)      Gluteus Medius (Clam Shell)      Sit To Stand            STANDING HIP MACHINE:      Hip Abduction [email or web address] x 10(B)     Hip Flexion [email or web address] x 10(B)     Hip Extension [email or web address] x 10(B)     Hip Adduction      Standing Knee Extension      4 Way Cable Walkout      Gluteus Medius (Crab Walk)      Knee Extension      Knee Flexion [email or web address] x 10(B)     Darrin Castro Stationary Bike 10 min     Treadmill      Stepper 10 min           FLEXIBILITY:      Heel Cord 3 min     Hamstring 3 min     Hip Flexors              [facility] Portal  Evaluation: ( X )  Manual Therapy (     ):   Therapeutic Modalities:                                                                                               HEP: As above; handouts given to patient for all exercises. Treatment/Session Assessment:    · Response to Treatment:  The patient tolerated today's treatment without symptom exacerbation. The patient demonstrated good exercise tolerance with the initial exercises and required manual and verbal cuing for proper technique. .  The patient should progress to an independent home exercise program within a few weeks. · Compliance with Program/Exercises: Will assess as treatment progresses. · Recommendations/Intent for next treatment session: \"Next visit will focus on advancements to more challenging activities\".    Total Treatment Duration:  PT Patient Time In/Time Out  Time In: 1300  Time Out: [address] [facility] [address] PT

## 2018-05-25 ENCOUNTER — HOSPITAL ENCOUNTER (OUTPATIENT)
Dept: PHYSICAL THERAPY | Age: 78
Discharge: HOME | End: 2018-05-25
Payer: MEDICARE

## 2018-05-25 PROCEDURE — 97110 THERAPEUTIC EXERCISES: CPT

## 2018-05-25 NOTE — PROGRESS NOTES
James Smithdulce  : 1940  Payor: Noel Acosta / Plan: [employer] [place] MEDICARE COMPLETE / Product Type: Managed Care Medicare /  [address] [place] [address]  at [address]  [address] [place] [address], [address]  [phone]   [phone]      OUTPATIENT PHYSICAL THERAPY:Daily Note 2018    ICD-10: Treatment Diagnosis:   R26.2 Difficulty in walking, not elsewhere classified     M25.662 Stiffness of left knee, not elsewhere classified     M25.562 Pain in left knee   Precautions/Allergies:   Review of patient's allergies indicates no known allergies. Fall Risk Score: 3 (? 5 = High Risk)  MD Orders: Evaluate and treat MEDICAL/REFERRING DIAGNOSIS:  Presence of left artificial knee joint [Z96.652]   DATE OF ONSET: 2018  REFERRING PHYSICIAN: Nikhil Nolan., *  RETURN PHYSICIAN APPOINTMENT: TBD     INITIAL ASSESSMENT:  Ms. Janet Rosales reported a L TKA on 2018 and has been receiving home physical therapy. She has been referred to outpatient physical therapy for continued rehabilitation. She ambualtes with a cane a L LE limp. PROBLEM LIST (Impacting functional limitations):  1. Decreased Strength  2. Decreased ADL/Functional Activities  3. Increased Pain  4. Decreased Activity Tolerance INTERVENTIONS PLANNED:  1. Home Exercise Program (HEP)  2. Manual Therapy  3. Neuromuscular Re-education/Strengthening  4. Range of Motion (ROM)  5. Therapeutic Exercise/Strengthening   TREATMENT PLAN:  Effective Dates: 2018 TO 2018. Frequency/Duration: 2 times a week for 90 days  GOALS: (Goals have been discussed and agreed upon with patient.)  SHORT-TERM FUNCTIONAL GOALS: Time Frame: 30 days  1. Increase LEFS 4 points to decrease pain 1-2 levels. 2. Increase L knee AROM 10 degrees to decrease pain 1-2 levels. 3. Increase L LE strength to allow initiation of stairs. DISCHARGE GOALS: Time Frame: 90 days  1.  Increase LEFS 9 points to decrease pain +2 levels. 2. Increase l LE strength to allow reciprocal stairs. 3. Demonstrate independence in home exercises to allow DC from physical therapy. Rehabilitation Potential For Stated Goals: Good  Regarding [de-identified] M Alphonso's therapy, I certify that the treatment plan above will be carried out by a therapist or under their direction. Thank you for this referral,  Magnolia Ojeda., PT                 The information in this section was collected on 5/23/2018 (except where otherwise noted). HISTORY:   History of Present Injury/Illness (Reason for Referral): The patient reported a L TKA on April 23, 2018 and has been receiving home physical therapy. She has been referred to outpatient physical therapy for continued rehabilitation. She ambualtes with a cane a L LE limp. Past Medical History/Comorbidities:   Ms. Tiara Benz  has a past medical history of Asthma with COPD ([facility] [address] CAD (coronary artery disease); Environmental and seasonal allergies; H/O echocardiogram (11/14/2016); History of nuclear stress test (12/04/2017); Graves' disease; Hyperlipidemia (11/16/2012); Hypertension; Menopause; Obesity (BMI 30-39.9); Osteoarthritis; Routine health maintenance (11/16/2012); S/P PTCA (percutaneous transluminal coronary angioplasty) (2012); Shortness of breath (05/10/2016); Status post total left knee replacement (4/23/2018); Status post total right knee replacement (2/1/2016); Thyroid disease; Tremor (11/16/2012); and Ulcerative colitis ([facility] [address] (11/7/2013). She also has no past medical history of Adverse effect of anesthesia; Difficult intubation; Malignant hyperthermia due to anesthesia; Nausea & vomiting; Pseudocholinesterase deficiency; or Unspecified adverse effect of anesthesia.   Ms. Tiara Benz  has a past surgical history that includes hx lap cholecystectomy (2002); hx hysterectomy (1983); hx heent (1981); hx tonsil and adenoidectomy (1959); hx heent; hx ptca (2012); hx knee replacement (Right, 02/01/2016); hx cataract removal (Left, 08/15/2016); and hx ankle fracture tx (Right, 1956). Social History/Living Environment:      Prior Level of Function/Work/Activity:  Retired  Dominant Side:         RIGHT  Current Medications:       Current Outpatient Prescriptions:     docusate sodium (COLACE) 100 mg capsule, Take 100 mg by mouth three (3) times daily as needed for Constipation. , Disp: , Rfl:     DULCOLAX, BISACODYL, PO, Take 100 mg by mouth as needed (constipation). , Disp: , Rfl:     aspirin delayed-release 81 mg tablet, Take 1 Tab by mouth every twelve (12) hours every twelve (12) hours for 35 days. , Disp: 70 Tab, Rfl: 0    HYDROmorphone (DILAUDID) 2 mg tablet, Take 1 Tab by mouth every four (4) hours as needed. Max Daily Amount: 12 mg., Disp: 40 Tab, Rfl: 0    acetaminophen (TYLENOL EXTRA STRENGTH) 500 mg tablet, Take  by mouth every six (6) hours as needed for Pain., Disp: , Rfl:     cholecalciferol, vitamin D3, (VITAMIN D3) 2,000 unit tab, Take  by mouth daily. , Disp: , Rfl:     losartan (COZAAR) 100 mg tablet, TAKE 1 TABLET BY MOUTH  DAILY FOR HYPERTENSION, Disp: 90 Tab, Rfl: 3    levothyroxine (SYNTHROID) 137 mcg tablet, Take 137 mcg by mouth Daily (before breakfast). , Disp: 90 Tab, Rfl: 3    metoprolol succinate (TOPROL-XL) 25 mg XL tablet, Take 1 Tab by mouth daily. , Disp: 90 Tab, Rfl: 3    simvastatin (ZOCOR) 40 mg tablet, Take 1 Tab by mouth nightly. (Patient taking differently: Take 40 mg by mouth daily.), Disp: 90 Tab, Rfl: 3    albuterol (PROVENTIL HFA, VENTOLIN HFA, PROAIR HFA) 90 mcg/actuation inhaler, Take 2 Puffs by inhalation every four (4) hours as needed for Wheezing., Disp: 3 Inhaler, Rfl: 3    fluticasone (FLONASE) 50 mcg/actuation nasal spray, 2 Sprays by Both Nostrils route daily. , Disp: 3 Bottle, Rfl: 3    multivitamin (DAILY MULTIPLE) tablet, Take 1 Tab by mouth daily.  Hold 7 days prior to surgery per anesthesia protocol, Disp: , Rfl:    Date Last Reviewed:  5/25/2018   Number of Personal Factors/Comorbidities that affect the Plan of Care: 1-2: MODERATE COMPLEXITY   EXAMINATION:     Observation/Orthostatic Postural Assessment:   Ambulation with a cane and L LE limp. Palpation:   Tenderness around her L knee incision. ROM: AROM : KNEE    R        L   Extension          0        0   Flexion         +125   +100                    Strength:    +3/5 L LE Strength   +4/5 R LR Strength           Special Tests: N/A  Neurological Screen: N/A  Functional Mobility: Independent with a  cane and L LE limp. Balance:  Good. Body Structures Involved:  1. Joints  2. Muscles Body Functions Affected:  1. Sensory/Pain  2. Neuromusculoskeletal  3. Movement Related Activities and Participation Affected:  1. General Tasks and Demands  2. Mobility  3. Community, Social and [place]   Number of elements (examined above) that affect the Plan of Care: 3: MODERATE COMPLEXITY   CLINICAL PRESENTATION:   Presentation: Evolving clinical presentation with changing clinical characteristics: MODERATE COMPLEXITY   CLINICAL DECISION MAKING:   Outcome Measure: Tool Used: Lower Extremity Functional Scale (LEFS)  Score:  Initial: 45/80 Most Recent: X/80 (Date: -- )   Interpretation of Score: 20 questions each scored on a 5 point scale with 0 representing \"extreme difficulty or unable to perform\" and 4 representing \"no difficulty\". The lower the score, the greater the functional disability. 80/80 represents no disability. Minimal detectable change is 9 points. Score 80 79-63 62-48 47-32 31-16 15-1 0   Modifier CH CI CJ CK CL CM CN     ? Mobility - Walking and Moving Around:     - CURRENT STATUS: CK - 40%-59% impaired, limited or restricted    - GOAL STATUS: CJ - 20%-39% impaired, limited or restricted    - D/C STATUS:  ---------------To be determined---------------    Medical Necessity:   · Patient demonstrates good rehab potential due to higher previous functional level.   Reason for Services/Other Comments:  · Patient continues to require skilled intervention due to her limited ability to ambulate with a normal gait pattern and perform ADLs in weight bearing. .   Use of outcome tool(s) and clinical judgement create a POC that gives a: Questionable prediction of patient's progress: MODERATE COMPLEXITY            TREATMENT:   (In addition to Assessment/Re-Assessment sessions the following treatments were rendered)  Pre-treatment Symptoms/Complaints:  The patient reported feeling stiff this morning which was not unexpected following a recent TKA. Pain: Initial:   Pain Intensity 1: 5  Post Session:  3   Therapeutic Exercise: ( 60): The patient received treatment as below to improve mobility, strength and balance. Required moderate verbal and manual cues to promote proper body alignment, promote proper body posture and promote proper body mechanics. Progressed resistance, range and repetitions as indicated. The patient received exercise instruction followed by patient demonstration of the exercises below along with hamstring and heel cord stretching to stabilize the patient's LE and improve AROM to decrease spasms, pain, and improve function.    Date:  5/23/2018 Date:  5/25/2018 Date:     Activity/Exercise Parameters Parameters Parameters   MAT  EXERCISES:      Hip ER / Abduction      Hip Adduction (Ball Squeeze)      Bridging      Straight Leg Raise      SAQ      Hip Abduction (Sidelying)      Gluteus Medius (Clam Shell)      Sit To Stand            STANDING HIP MACHINE:      Hip Abduction [email or web address] x 10(B) [email or web address] x 10(B)    Hip Flexion [email or web address] x 10(B) [email or web address] x 10(B)    Hip Extension [email or web address] x 10(B) [email or web address] x 10(B)    Hip Adduction      Standing Knee Extension      4 Way Cable Walkout      Gluteus Medius (Crab Walk)      Knee Extension      Knee Flexion [email or web address] x 10(B) [email or web address] x 10(B)    Step Downs            Stationary Bike 10 min 10 min    Treadmill      Stepper 10 min 10 min          FLEXIBILITY:      Heel Cord 3 min 3 min Hamstring 3 min 3 min    Hip Flexors              [facility] Portal  Evaluation: (  )  Manual Therapy (     ):   Therapeutic Modalities:                                                                                               HEP: As above; handouts given to patient for all exercises. Treatment/Session Assessment:    · Response to Treatment:  The patient tolerated today's treatment without symptom exacerbation. The patient demonstrated good exercise tolerance with an increase in weights for most of the exercises and required manual and verbal cuing for proper technique. .  The patient should progress to an independent home exercise program within a few weeks. · Compliance with Program/Exercises: Will assess as treatment progresses. · Recommendations/Intent for next treatment session: \"Next visit will focus on advancements to more challenging activities\".    Total Treatment Duration:  PT Patient Time In/Time Out  Time In: 1000  Time Out: [address] PT

## 2018-05-29 ENCOUNTER — HOSPITAL ENCOUNTER (OUTPATIENT)
Dept: PHYSICAL THERAPY | Age: 78
Discharge: HOME | End: 2018-05-29
Payer: MEDICARE

## 2018-05-29 PROCEDURE — 97110 THERAPEUTIC EXERCISES: CPT

## 2018-05-29 NOTE — PROGRESS NOTES
Rekha Villela  : 1940  Payor: Kelly Mihir / Plan: Eliz Shoemaker MEDICARE COMPLETE / Product Type: Managed Care Medicare /  [address] [place] [address]  at [facility]  [address], [address]  [phone]   [phone]      OUTPATIENT PHYSICAL THERAPY:Daily Note 2018    ICD-10: Treatment Diagnosis:   R26.2 Difficulty in walking, not elsewhere classified     M25.662 Stiffness of left knee, not elsewhere classified     M25.562 Pain in left knee   Precautions/Allergies:   Review of patient's allergies indicates no known allergies. Fall Risk Score: 3 (? 5 = High Risk)  MD Orders: Evaluate and treat MEDICAL/REFERRING DIAGNOSIS:  Presence of left artificial knee joint [Z96.652]   DATE OF ONSET: 2018  REFERRING PHYSICIAN: Cierra Song., *  RETURN PHYSICIAN APPOINTMENT: TBD     INITIAL ASSESSMENT:  Ms. Ann Zimmer reported a L TKA on 2018 and has been receiving home physical therapy. She has been referred to outpatient physical therapy for continued rehabilitation. She ambualtes with a cane a L LE limp. PROBLEM LIST (Impacting functional limitations):  1. Decreased Strength  2. Decreased ADL/Functional Activities  3. Increased Pain  4. Decreased Activity Tolerance INTERVENTIONS PLANNED:  1. Home Exercise Program (HEP)  2. Manual Therapy  3. Neuromuscular Re-education/Strengthening  4. Range of Motion (ROM)  5. Therapeutic Exercise/Strengthening   TREATMENT PLAN:  Effective Dates: 2018 TO 2018. Frequency/Duration: 2 times a week for 90 days  GOALS: (Goals have been discussed and agreed upon with patient.)  SHORT-TERM FUNCTIONAL GOALS: Time Frame: 30 days  1. Increase LEFS 4 points to decrease pain 1-2 levels. 2. Increase L knee AROM 10 degrees to decrease pain 1-2 levels. 3. Increase L LE strength to allow initiation of stairs. DISCHARGE GOALS: Time Frame: 90 days  1.  Increase LEFS 9 points to decrease pain +2 levels. 2. Increase l LE strength to allow reciprocal stairs. 3. Demonstrate independence in home exercises to allow DC from physical therapy. Rehabilitation Potential For Stated Goals: Good  Regarding [de-identified] M Alphonso's therapy, I certify that the treatment plan above will be carried out by a therapist or under their direction. Thank you for this referral,  Rose Hernadnez., PT                 The information in this section was collected on 5/23/2018 (except where otherwise noted). HISTORY:   History of Present Injury/Illness (Reason for Referral): The patient reported a L TKA on April 23, 2018 and has been receiving home physical therapy. She has been referred to outpatient physical therapy for continued rehabilitation. She ambualtes with a cane a L LE limp. Past Medical History/Comorbidities:   Ms. Elisa Novak  has a past medical history of Asthma with COPD ([facility] [address] CAD (coronary artery disease); Environmental and seasonal allergies; H/O echocardiogram (11/14/2016); History of nuclear stress test (12/04/2017); Graves' disease; Hyperlipidemia (11/16/2012); Hypertension; Menopause; Obesity (BMI 30-39.9); Osteoarthritis; Routine health maintenance (11/16/2012); S/P PTCA (percutaneous transluminal coronary angioplasty) (2012); Shortness of breath (05/10/2016); Status post total left knee replacement (4/23/2018); Status post total right knee replacement (2/1/2016); Thyroid disease; Tremor (11/16/2012); and Ulcerative colitis ([facility] [address] (11/7/2013). She also has no past medical history of Adverse effect of anesthesia; Difficult intubation; Malignant hyperthermia due to anesthesia; Nausea & vomiting; Pseudocholinesterase deficiency; or Unspecified adverse effect of anesthesia.   Ms. Elisa Novak  has a past surgical history that includes hx lap cholecystectomy (2002); hx hysterectomy (1983); hx heent (1981); hx tonsil and adenoidectomy (1959); hx heent; hx ptca (2012); hx knee replacement (Right, 02/01/2016); hx cataract removal (Left, 08/15/2016); and hx ankle fracture tx (Right, 1956). Social History/Living Environment:      Prior Level of Function/Work/Activity:  Retired  Dominant Side:         RIGHT  Current Medications:       Current Outpatient Prescriptions:     docusate sodium (COLACE) 100 mg capsule, Take 100 mg by mouth three (3) times daily as needed for Constipation. , Disp: , Rfl:     DULCOLAX, BISACODYL, PO, Take 100 mg by mouth as needed (constipation). , Disp: , Rfl:     HYDROmorphone (DILAUDID) 2 mg tablet, Take 1 Tab by mouth every four (4) hours as needed. Max Daily Amount: 12 mg., Disp: 40 Tab, Rfl: 0    acetaminophen (TYLENOL EXTRA STRENGTH) 500 mg tablet, Take  by mouth every six (6) hours as needed for Pain., Disp: , Rfl:     cholecalciferol, vitamin D3, (VITAMIN D3) 2,000 unit tab, Take  by mouth daily. , Disp: , Rfl:     losartan (COZAAR) 100 mg tablet, TAKE 1 TABLET BY MOUTH  DAILY FOR HYPERTENSION, Disp: 90 Tab, Rfl: 3    levothyroxine (SYNTHROID) 137 mcg tablet, Take 137 mcg by mouth Daily (before breakfast). , Disp: 90 Tab, Rfl: 3    metoprolol succinate (TOPROL-XL) 25 mg XL tablet, Take 1 Tab by mouth daily. , Disp: 90 Tab, Rfl: 3    simvastatin (ZOCOR) 40 mg tablet, Take 1 Tab by mouth nightly. (Patient taking differently: Take 40 mg by mouth daily.), Disp: 90 Tab, Rfl: 3    albuterol (PROVENTIL HFA, VENTOLIN HFA, PROAIR HFA) 90 mcg/actuation inhaler, Take 2 Puffs by inhalation every four (4) hours as needed for Wheezing., Disp: 3 Inhaler, Rfl: 3    fluticasone (FLONASE) 50 mcg/actuation nasal spray, 2 Sprays by Both Nostrils route daily. , Disp: 3 Bottle, Rfl: 3    multivitamin (DAILY MULTIPLE) tablet, Take 1 Tab by mouth daily.  Hold 7 days prior to surgery per anesthesia protocol, Disp: , Rfl:    Date Last Reviewed:  5/29/2018   Number of Personal Factors/Comorbidities that affect the Plan of Care: 1-2: MODERATE COMPLEXITY   EXAMINATION:     Observation/Orthostatic Postural Assessment: Ambulation with a cane and L LE limp. Palpation:   Tenderness around her L knee incision. ROM: AROM : KNEE    R        L   Extension          0        0   Flexion         +125   +100                    Strength:    +3/5 L LE Strength   +4/5 R LR Strength           Special Tests: N/A  Neurological Screen: N/A  Functional Mobility: Independent with a  cane and L LE limp. Balance:  Good. Body Structures Involved:  1. Joints  2. Muscles Body Functions Affected:  1. Sensory/Pain  2. Neuromusculoskeletal  3. Movement Related Activities and Participation Affected:  1. General Tasks and Demands  2. Mobility  3. Community, Social and [place]   Number of elements (examined above) that affect the Plan of Care: 3: MODERATE COMPLEXITY   CLINICAL PRESENTATION:   Presentation: Evolving clinical presentation with changing clinical characteristics: MODERATE COMPLEXITY   CLINICAL DECISION MAKING:   Outcome Measure: Tool Used: Lower Extremity Functional Scale (LEFS)  Score:  Initial: 45/80 Most Recent: X/80 (Date: -- )   Interpretation of Score: 20 questions each scored on a 5 point scale with 0 representing \"extreme difficulty or unable to perform\" and 4 representing \"no difficulty\". The lower the score, the greater the functional disability. 80/80 represents no disability. Minimal detectable change is 9 points. Score 80 79-63 62-48 47-32 31-16 15-1 0   Modifier CH CI CJ CK CL CM CN     ? Mobility - Walking and Moving Around:     - CURRENT STATUS: CK - 40%-59% impaired, limited or restricted    - GOAL STATUS: CJ - 20%-39% impaired, limited or restricted    - D/C STATUS:  ---------------To be determined---------------    Medical Necessity:   · Patient demonstrates good rehab potential due to higher previous functional level.   Reason for Services/Other Comments:  · Patient continues to require skilled intervention due to her limited ability to ambulate with a normal gait pattern and perform ADLs in weight bearing. .   Use of outcome tool(s) and clinical judgement create a POC that gives a: Questionable prediction of patient's progress: MODERATE COMPLEXITY            TREATMENT:   (In addition to Assessment/Re-Assessment sessions the following treatments were rendered)  Pre-treatment Symptoms/Complaints:  The patient reported being consistent with her exercises. Pain: Initial:   Pain Intensity 1: 4  Post Session:  3   Therapeutic Exercise: ( 60): The patient received treatment as below to improve mobility, strength and balance. Required moderate verbal and manual cues to promote proper body alignment, promote proper body posture and promote proper body mechanics. Progressed resistance, range and repetitions as indicated. The patient received exercise instruction followed by patient demonstration of the exercises below along with hamstring and heel cord stretching to stabilize the patient's LE and improve AROM to decrease spasms, pain, and improve function.    Date:  5/23/2018 Date:  5/25/2018 Date:  5/29/2018       Activity/Exercise Parameters Parameters Parameters       MAT  EXERCISES:          Hip ER / Abduction          Hip Adduction (Ball Squeeze)          Bridging          Straight Leg Raise          SAQ          Hip Abduction (Sidelying)          Gluteus Medius (Clam Shell)          Sit To Stand                    STANDING HIP MACHINE:          Hip Abduction [email or web address] x 10(B) [email or web address] x 10(B) [email or web address] x 15(B)       Hip Flexion [email or web address] x 10(B)     [email or web address] x 10(B)     [email or web address] x 15(B)       Hip Extension [email or web address] x 10(B) [email or web address] x 10(B) [email or web address] x 15(B)       Hip Adduction          Standing Knee Extension          4 Way Cable Walkout          Gluteus Medius (Crab Walk)          Knee Extension          Knee Flexion [email or web address] x 10(B) [email or web address] x 10(B) [email or web address] x 15(B)       Step Downs   [email or web address] x 15(B)                 Stationary Bike 10 min 10 min 10 min       Treadmill          Stepper 10 min 10 min 10 min                 FLEXIBILITY:          Heel Cord 3 min 3 min 3 min       Hamstring 3 min 3 min 3 min       Hip Flexors                      [facility] Portal  Evaluation: (  )  Manual Therapy (     ):   Therapeutic Modalities:                                                                                               HEP: As above; handouts given to patient for all exercises. Treatment/Session Assessment:    · Response to Treatment:  The patient tolerated today's treatment without symptom exacerbation. The patient demonstrated good exercise tolerance with an increase in reps for most of the exercises and continued to require manual and verbal cuing for proper technique. .  The patient should progress to an independent home exercise program within a few weeks. · Compliance with Program/Exercises: Will assess as treatment progresses. · Recommendations/Intent for next treatment session: \"Next visit will focus on advancements to more challenging activities\".    Total Treatment Duration:  PT Patient Time In/Time Out  Time In: 1000  Time Out: [address] [facility]., PT

## 2018-06-01 ENCOUNTER — HOSPITAL ENCOUNTER (OUTPATIENT)
Dept: PHYSICAL THERAPY | Age: 78
Discharge: HOME | End: 2018-06-01
Payer: MEDICARE

## 2018-06-01 PROCEDURE — 97110 THERAPEUTIC EXERCISES: CPT

## 2018-06-01 NOTE — PROGRESS NOTES
Aki Antonio  : 1940  Payor: Twila Legacy / Plan: Brandon Richchen MEDICARE COMPLETE / Product Type: Managed Care Medicare /  [address]  at [facility] Therapy  [address] [place] [address], [address]  [phone]   [phone]      OUTPATIENT PHYSICAL THERAPY:Daily Note 2018    ICD-10: Treatment Diagnosis:   R26.2 Difficulty in walking, not elsewhere classified     M25.662 Stiffness of left knee, not elsewhere classified     M25.562 Pain in left knee   Precautions/Allergies:   Review of patient's allergies indicates no known allergies. Fall Risk Score: 3 (? 5 = High Risk)  MD Orders: Evaluate and treat MEDICAL/REFERRING DIAGNOSIS:  Presence of left artificial knee joint [Z96.652]   DATE OF ONSET: 2018  REFERRING PHYSICIAN: Mt Wong., *  RETURN PHYSICIAN APPOINTMENT: TBD     INITIAL ASSESSMENT:  Ms. Arlene Zarate reported a L TKA on 2018 and has been receiving home physical therapy. She has been referred to outpatient physical therapy for continued rehabilitation. She ambualtes with a cane a L LE limp. PROBLEM LIST (Impacting functional limitations):  1. Decreased Strength  2. Decreased ADL/Functional Activities  3. Increased Pain  4. Decreased Activity Tolerance INTERVENTIONS PLANNED:  1. Home Exercise Program (HEP)  2. Manual Therapy  3. Neuromuscular Re-education/Strengthening  4. Range of Motion (ROM)  5. Therapeutic Exercise/Strengthening   TREATMENT PLAN:  Effective Dates: 2018 TO 2018. Frequency/Duration: 2 times a week for 90 days  GOALS: (Goals have been discussed and agreed upon with patient.)  SHORT-TERM FUNCTIONAL GOALS: Time Frame: 30 days  1. Increase LEFS 4 points to decrease pain 1-2 levels. 2. Increase L knee AROM 10 degrees to decrease pain 1-2 levels. 3. Increase L LE strength to allow initiation of stairs. DISCHARGE GOALS: Time Frame: 90 days  1.  Increase LEFS 9 points to decrease pain +2 levels. 2. Increase l LE strength to allow reciprocal stairs. 3. Demonstrate independence in home exercises to allow DC from physical therapy. Rehabilitation Potential For Stated Goals: Good  Regarding [de-identified] M Alphonso's therapy, I certify that the treatment plan above will be carried out by a therapist or under their direction. Thank you for this referral,  Sayda Dillon, PT                 The information in this section was collected on 5/23/2018 (except where otherwise noted). HISTORY:   History of Present Injury/Illness (Reason for Referral): The patient reported a L TKA on April 23, 2018 and has been receiving home physical therapy. She has been referred to outpatient physical therapy for continued rehabilitation. She ambualtes with a cane a L LE limp. Past Medical History/Comorbidities:   Ms. Arlene Zarate  has a past medical history of Asthma with COPD ([facility] [address] CAD (coronary artery disease); Environmental and seasonal allergies; H/O echocardiogram (11/14/2016); History of nuclear stress test (12/04/2017); Graves' disease; Hyperlipidemia (11/16/2012); Hypertension; Menopause; Obesity (BMI 30-39.9); Osteoarthritis; Routine health maintenance (11/16/2012); S/P PTCA (percutaneous transluminal coronary angioplasty) (2012); Shortness of breath (05/10/2016); Status post total left knee replacement (4/23/2018); Status post total right knee replacement (2/1/2016); Thyroid disease; Tremor (11/16/2012); and Ulcerative colitis ([facility] [address] (11/7/2013). She also has no past medical history of Adverse effect of anesthesia; Difficult intubation; Malignant hyperthermia due to anesthesia; Nausea & vomiting; Pseudocholinesterase deficiency; or Unspecified adverse effect of anesthesia.   Ms. Arlene Zarate  has a past surgical history that includes hx lap cholecystectomy (2002); hx hysterectomy (1983); hx heent (1981); hx tonsil and adenoidectomy (1959); hx heent; hx ptca (2012); hx knee replacement (Right, 02/01/2016); hx cataract removal (Left, 08/15/2016); and hx ankle fracture tx (Right, 1956). Social History/Living Environment:      Prior Level of Function/Work/Activity:  Retired  Dominant Side:         RIGHT  Current Medications:       Current Outpatient Prescriptions:     docusate sodium (COLACE) 100 mg capsule, Take 100 mg by mouth three (3) times daily as needed for Constipation. , Disp: , Rfl:     DULCOLAX, BISACODYL, PO, Take 100 mg by mouth as needed (constipation). , Disp: , Rfl:     HYDROmorphone (DILAUDID) 2 mg tablet, Take 1 Tab by mouth every four (4) hours as needed. Max Daily Amount: 12 mg., Disp: 40 Tab, Rfl: 0    acetaminophen (TYLENOL EXTRA STRENGTH) 500 mg tablet, Take  by mouth every six (6) hours as needed for Pain., Disp: , Rfl:     cholecalciferol, vitamin D3, (VITAMIN D3) 2,000 unit tab, Take  by mouth daily. , Disp: , Rfl:     losartan (COZAAR) 100 mg tablet, TAKE 1 TABLET BY MOUTH  DAILY FOR HYPERTENSION, Disp: 90 Tab, Rfl: 3    levothyroxine (SYNTHROID) 137 mcg tablet, Take 137 mcg by mouth Daily (before breakfast). , Disp: 90 Tab, Rfl: 3    metoprolol succinate (TOPROL-XL) 25 mg XL tablet, Take 1 Tab by mouth daily. , Disp: 90 Tab, Rfl: 3    simvastatin (ZOCOR) 40 mg tablet, Take 1 Tab by mouth nightly. (Patient taking differently: Take 40 mg by mouth daily.), Disp: 90 Tab, Rfl: 3    albuterol (PROVENTIL HFA, VENTOLIN HFA, PROAIR HFA) 90 mcg/actuation inhaler, Take 2 Puffs by inhalation every four (4) hours as needed for Wheezing., Disp: 3 Inhaler, Rfl: 3    fluticasone (FLONASE) 50 mcg/actuation nasal spray, 2 Sprays by Both Nostrils route daily. , Disp: 3 Bottle, Rfl: 3    multivitamin (DAILY MULTIPLE) tablet, Take 1 Tab by mouth daily.  Hold 7 days prior to surgery per anesthesia protocol, Disp: , Rfl:    Date Last Reviewed:  6/1/2018   Number of Personal Factors/Comorbidities that affect the Plan of Care: 1-2: MODERATE COMPLEXITY   EXAMINATION:     Observation/Orthostatic Postural Assessment: Ambulation with a cane and L LE limp. Palpation:   Tenderness around her L knee incision. ROM: AROM : KNEE    R        L   Extension          0        0   Flexion         +125   +100                    Strength:    +3/5 L LE Strength   +4/5 R LR Strength           Special Tests: N/A  Neurological Screen: N/A  Functional Mobility: Independent with a  cane and L LE limp. Balance:  Good. Body Structures Involved:  1. Joints  2. Muscles Body Functions Affected:  1. Sensory/Pain  2. Neuromusculoskeletal  3. Movement Related Activities and Participation Affected:  1. General Tasks and Demands  2. Mobility  3. Community, Social and [place]   Number of elements (examined above) that affect the Plan of Care: 3: MODERATE COMPLEXITY   CLINICAL PRESENTATION:   Presentation: Evolving clinical presentation with changing clinical characteristics: MODERATE COMPLEXITY   CLINICAL DECISION MAKING:   Outcome Measure: Tool Used: Lower Extremity Functional Scale (LEFS)  Score:  Initial: 45/80 Most Recent: X/80 (Date: -- )   Interpretation of Score: 20 questions each scored on a 5 point scale with 0 representing \"extreme difficulty or unable to perform\" and 4 representing \"no difficulty\". The lower the score, the greater the functional disability. 80/80 represents no disability. Minimal detectable change is 9 points. Score 80 79-63 62-48 47-32 31-16 15-1 0   Modifier CH CI CJ CK CL CM CN     ? Mobility - Walking and Moving Around:     - CURRENT STATUS: CK - 40%-59% impaired, limited or restricted    - GOAL STATUS: CJ - 20%-39% impaired, limited or restricted    - D/C STATUS:  ---------------To be determined---------------    Medical Necessity:   · Patient demonstrates good rehab potential due to higher previous functional level.   Reason for Services/Other Comments:  · Patient continues to require skilled intervention due to her limited ability to ambulate with a normal gait pattern and perform ADLs in weight bearing. .   Use of outcome tool(s) and clinical judgement create a POC that gives a: Questionable prediction of patient's progress: MODERATE COMPLEXITY            TREATMENT:   (In addition to Assessment/Re-Assessment sessions the following treatments were rendered)  Pre-treatment Symptoms/Complaints:  The patient reported seeing the PA at her surgeons office and he was pleased with her progress. She also drove herself to therapy for the first time since surgery today. Pain: Initial:   Pain Intensity 1: 4  Post Session:  3   Therapeutic Exercise: ( 60): The patient received treatment as below to improve mobility, strength and balance. Required moderate verbal and manual cues to promote proper body alignment, promote proper body posture and promote proper body mechanics. Progressed resistance, range and repetitions as indicated. The patient received exercise instruction followed by patient demonstration of the exercises below along with hamstring and heel cord stretching to stabilize the patient's LE and improve AROM to decrease spasms, pain, and improve function.    Date:  5/23/2018 Date:  5/25/2018 Date:  5/29/2018 Date:  6/1/2018      Activity/Exercise Parameters Parameters Parameters       MAT  EXERCISES:          Hip ER / Abduction          Hip Adduction (Ball Squeeze)          Bridging          Straight Leg Raise          SAQ          Hip Abduction (Sidelying)          Gluteus Medius (Clam Shell)          Sit To Stand                    STANDING HIP MACHINE:          Hip Abduction [email or web address] x 10(B) [email or web address] x 10(B) [email or web address] x 15(B) [email or web address] x 15(B)      Hip Flexion [email or web address] x 10(B)     [email or web address] x 10(B)     [email or web address] x 15(B) [email or web address] x 15(B)      Hip Extension [email or web address] x 10(B) [email or web address] x 10(B) [email or web address] x 15(B) [email or web address] x 15(B)      Hip Adduction          Standing Knee Extension          4 Way Cable Walkout          Gluteus Medius (Crab Walk)          Knee Extension    [email or web address] x 15(B)      Knee Flexion [email or web address] x 10(B) [email or web address] x 10(B) [email or web address] x 15(B) [email or web address] x 15(B) [email or web address] x 15(B)                 Stationary Bike 10 min 10 min 10 min 10 min      Treadmill          Stepper 10 min 10 min 10 min 10 min                FLEXIBILITY:          Heel Cord 3 min 3 min 3 min 3 min      Hamstring 3 min 3 min 3 min 3 min      Hip Flexors                      [facility] Portal  Evaluation: (  )  Manual Therapy (     ):   Therapeutic Modalities:                                                                                               HEP: As above; handouts given to patient for all exercises. Treatment/Session Assessment:    · Response to Treatment:  The patient tolerated today's treatment without symptom exacerbation. The patient demonstrated good exercise tolerance with and should be able to tolerate and increase in weight soon on the exercises. Most of the exercises continue to require manual and verbal cuing for proper technique. .  The patient should progress to an independent home exercise program within a few weeks. · Compliance with Program/Exercises: Will assess as treatment progresses. · Recommendations/Intent for next treatment session: \"Next visit will focus on advancements to more challenging activities\".    Total Treatment Duration:  PT Patient Time In/Time Out  Time In: 1000  Time Out: [address] PT

## 2018-06-04 ENCOUNTER — HOSPITAL ENCOUNTER (OUTPATIENT)
Dept: PHYSICAL THERAPY | Age: 78
Discharge: HOME | End: 2018-06-04
Payer: MEDICARE

## 2018-06-04 PROCEDURE — 97110 THERAPEUTIC EXERCISES: CPT

## 2018-06-04 NOTE — PROGRESS NOTES
María Sinha  : 1940  Payor: Shilo Guevara / Plan: Renae Boswell MEDICARE COMPLETE / Product Type: Managed Care Medicare /  [facility] [place]  at [facility]  [address], [address]  [phone]   [phone]      OUTPATIENT PHYSICAL THERAPY:Daily Note 2018    ICD-10: Treatment Diagnosis:   R26.2 Difficulty in walking, not elsewhere classified     M25.662 Stiffness of left knee, not elsewhere classified     M25.562 Pain in left knee   Precautions/Allergies:   Review of patient's allergies indicates no known allergies. Fall Risk Score: 3 (? 5 = High Risk)  MD Orders: Evaluate and treat MEDICAL/REFERRING DIAGNOSIS:  Presence of left artificial knee joint [Z96.652]   DATE OF ONSET: 2018  REFERRING PHYSICIAN: Rosalva Tam., *  RETURN PHYSICIAN APPOINTMENT: TBD     INITIAL ASSESSMENT:  Ms. Tatyana Edmonds reported a L TKA on 2018 and has been receiving home physical therapy. She has been referred to outpatient physical therapy for continued rehabilitation. She ambualtes with a cane a L LE limp. PROBLEM LIST (Impacting functional limitations):  1. Decreased Strength  2. Decreased ADL/Functional Activities  3. Increased Pain  4. Decreased Activity Tolerance INTERVENTIONS PLANNED:  1. Home Exercise Program (HEP)  2. Manual Therapy  3. Neuromuscular Re-education/Strengthening  4. Range of Motion (ROM)  5. Therapeutic Exercise/Strengthening   TREATMENT PLAN:  Effective Dates: 2018 TO 2018. Frequency/Duration: 2 times a week for 90 days  GOALS: (Goals have been discussed and agreed upon with patient.)  SHORT-TERM FUNCTIONAL GOALS: Time Frame: 30 days  1. Increase LEFS 4 points to decrease pain 1-2 levels. 2. Increase L knee AROM 10 degrees to decrease pain 1-2 levels. 3. Increase L LE strength to allow initiation of stairs. DISCHARGE GOALS: Time Frame: 90 days  1.  Increase LEFS 9 points to decrease pain +2 levels. 2. Increase l LE strength to allow reciprocal stairs. 3. Demonstrate independence in home exercises to allow DC from physical therapy. Rehabilitation Potential For Stated Goals: Good  Regarding [de-identified] M Alphonso's therapy, I certify that the treatment plan above will be carried out by a therapist or under their direction. Thank you for this referral,  Stas Faith, PT                 The information in this section was collected on 5/23/2018 (except where otherwise noted). HISTORY:   History of Present Injury/Illness (Reason for Referral): The patient reported a L TKA on April 23, 2018 and has been receiving home physical therapy. She has been referred to outpatient physical therapy for continued rehabilitation. She ambualtes with a cane a L LE limp. Past Medical History/Comorbidities:   Ms. Bebe Yao  has a past medical history of Asthma with COPD ([facility] [address] CAD (coronary artery disease); Environmental and seasonal allergies; H/O echocardiogram (11/14/2016); History of nuclear stress test (12/04/2017); Graves' disease; Hyperlipidemia (11/16/2012); Hypertension; Menopause; Obesity (BMI 30-39.9); Osteoarthritis; Routine health maintenance (11/16/2012); S/P PTCA (percutaneous transluminal coronary angioplasty) (2012); Shortness of breath (05/10/2016); Status post total left knee replacement (4/23/2018); Status post total right knee replacement (2/1/2016); Thyroid disease; Tremor (11/16/2012); and Ulcerative colitis ([facility] [address] (11/7/2013). She also has no past medical history of Adverse effect of anesthesia; Difficult intubation; Malignant hyperthermia due to anesthesia; Nausea & vomiting; Pseudocholinesterase deficiency; or Unspecified adverse effect of anesthesia.   Ms. Bebe Yao  has a past surgical history that includes hx lap cholecystectomy (2002); hx hysterectomy (1983); hx heent (1981); hx tonsil and adenoidectomy (1959); hx heent; hx ptca (2012); hx knee replacement (Right, 02/01/2016); hx cataract removal (Left, 08/15/2016); and hx ankle fracture tx (Right, 1956). Social History/Living Environment:      Prior Level of Function/Work/Activity:  Retired  Dominant Side:         RIGHT  Current Medications:       Current Outpatient Prescriptions:     docusate sodium (COLACE) 100 mg capsule, Take 100 mg by mouth three (3) times daily as needed for Constipation. , Disp: , Rfl:     DULCOLAX, BISACODYL, PO, Take 100 mg by mouth as needed (constipation). , Disp: , Rfl:     HYDROmorphone (DILAUDID) 2 mg tablet, Take 1 Tab by mouth every four (4) hours as needed. Max Daily Amount: 12 mg., Disp: 40 Tab, Rfl: 0    acetaminophen (TYLENOL EXTRA STRENGTH) 500 mg tablet, Take  by mouth every six (6) hours as needed for Pain., Disp: , Rfl:     cholecalciferol, vitamin D3, (VITAMIN D3) 2,000 unit tab, Take  by mouth daily. , Disp: , Rfl:     losartan (COZAAR) 100 mg tablet, TAKE 1 TABLET BY MOUTH  DAILY FOR HYPERTENSION, Disp: 90 Tab, Rfl: 3    levothyroxine (SYNTHROID) 137 mcg tablet, Take 137 mcg by mouth Daily (before breakfast). , Disp: 90 Tab, Rfl: 3    metoprolol succinate (TOPROL-XL) 25 mg XL tablet, Take 1 Tab by mouth daily. , Disp: 90 Tab, Rfl: 3    simvastatin (ZOCOR) 40 mg tablet, Take 1 Tab by mouth nightly. (Patient taking differently: Take 40 mg by mouth daily.), Disp: 90 Tab, Rfl: 3    albuterol (PROVENTIL HFA, VENTOLIN HFA, PROAIR HFA) 90 mcg/actuation inhaler, Take 2 Puffs by inhalation every four (4) hours as needed for Wheezing., Disp: 3 Inhaler, Rfl: 3    fluticasone (FLONASE) 50 mcg/actuation nasal spray, 2 Sprays by Both Nostrils route daily. , Disp: 3 Bottle, Rfl: 3    multivitamin (DAILY MULTIPLE) tablet, Take 1 Tab by mouth daily.  Hold 7 days prior to surgery per anesthesia protocol, Disp: , Rfl:    Date Last Reviewed:  6/4/2018   Number of Personal Factors/Comorbidities that affect the Plan of Care: 1-2: MODERATE COMPLEXITY   EXAMINATION:     Observation/Orthostatic Postural Assessment: Ambulation with a cane and L LE limp. Palpation:   Tenderness around her L knee incision. ROM: AROM : KNEE    R        L   Extension          0        0   Flexion         +125   +100                    Strength:    +3/5 L LE Strength   +4/5 R LR Strength           Special Tests: N/A  Neurological Screen: N/A  Functional Mobility: Independent with a  cane and L LE limp. Balance:  Good. Body Structures Involved:  1. Joints  2. Muscles Body Functions Affected:  1. Sensory/Pain  2. Neuromusculoskeletal  3. Movement Related Activities and Participation Affected:  1. General Tasks and Demands  2. Mobility  3. Community, Social and [place]   Number of elements (examined above) that affect the Plan of Care: 3: MODERATE COMPLEXITY   CLINICAL PRESENTATION:   Presentation: Evolving clinical presentation with changing clinical characteristics: MODERATE COMPLEXITY   CLINICAL DECISION MAKING:   Outcome Measure: Tool Used: Lower Extremity Functional Scale (LEFS)  Score:  Initial: 45/80 Most Recent: X/80 (Date: -- )   Interpretation of Score: 20 questions each scored on a 5 point scale with 0 representing \"extreme difficulty or unable to perform\" and 4 representing \"no difficulty\". The lower the score, the greater the functional disability. 80/80 represents no disability. Minimal detectable change is 9 points. Score 80 79-63 62-48 47-32 31-16 15-1 0   Modifier CH CI CJ CK CL CM CN     ? Mobility - Walking and Moving Around:     - CURRENT STATUS: CK - 40%-59% impaired, limited or restricted    - GOAL STATUS: CJ - 20%-39% impaired, limited or restricted    - D/C STATUS:  ---------------To be determined---------------    Medical Necessity:   · Patient demonstrates good rehab potential due to higher previous functional level.   Reason for Services/Other Comments:  · Patient continues to require skilled intervention due to her limited ability to ambulate with a normal gait pattern and perform ADLs in weight bearing. .   Use of outcome tool(s) and clinical judgement create a POC that gives a: Questionable prediction of patient's progress: MODERATE COMPLEXITY            TREATMENT:   (In addition to Assessment/Re-Assessment sessions the following treatments were rendered)  Pre-treatment Symptoms/Complaints:  The patient reported she continues to experience stiffness in morning but decreases as the day progresses. Pain: Initial:   Pain Intensity 1: 4  Post Session:  3   Therapeutic Exercise: ( 60): The patient received treatment as below to improve mobility, strength and balance. Required moderate verbal and manual cues to promote proper body alignment, promote proper body posture and promote proper body mechanics. Progressed resistance, range and repetitions as indicated. The patient received exercise instruction followed by patient demonstration of the exercises below along with hamstring and heel cord stretching to stabilize the patient's LE and improve AROM to decrease spasms, pain, and improve function.    Date:  5/23/2018 Date:  5/25/2018 Date:  5/29/2018 Date:  6/1/2018 Date:  6/4/2018     Activity/Exercise Parameters Parameters Parameters       MAT  EXERCISES:          Hip ER / Abduction          Hip Adduction (Ball Squeeze)          Bridging          Straight Leg Raise          SAQ          Hip Abduction (Sidelying)          Gluteus Medius (Clam Shell)          Sit To Stand                    STANDING HIP MACHINE:          Hip Abduction [email or web address] x 10(B) [email or web address] x 10(B) [email or web address] x 15(B) [email or web address] x 15(B) [email or web address] x 15(B)     Hip Flexion [email or web address] x 10(B)     [email or web address] x 10(B)     [email or web address] x 15(B) [email or web address] x 15(B) [email or web address] x 15(B)     Hip Extension [email or web address] x 10(B) [email or web address] x 10(B) [email or web address] x 15(B) [email or web address] x 15(B) [email or web address] x 15(B)     Hip Adduction          Standing Knee Extension     [email or web address] x 15(B)     4 Way Cable Walkout          Gluteus Medius (Crab Walk)          Knee Extension    [email or web address] x 15(B) [email or web address] x 15(B)     Knee Flexion [email or web address] x 10(B) [email or web address] x 10(B) [email or web address] x 15(B) [email or web address] x 15(B) [email or web address] x 15(B)     Step Downs   [email or web address] x 15(B)                 Stationary Bike 10 min 10 min 10 min 10 min 10 min     Treadmill     5 min     Stepper 10 min 10 min 10 min 10 min 10 min               FLEXIBILITY:          Heel Cord 3 min 3 min 3 min 3 min 3 min     Hamstring 3 min 3 min 3 min 3 min 3 min     Hip Flexors                      MedBridge Portal  Evaluation: (  )  Manual Therapy (     ):   Therapeutic Modalities:                                                                                               HEP: As above; handouts given to patient for all exercises. Treatment/Session Assessment:    · Response to Treatment:  The patient tolerated today's treatment without symptom exacerbation. The patient demonstrated good exercise tolerance and was able to increase  Weights with most of the exercises, and continued to require manual and verbal cuing for proper technique. .  The patient should progress to an independent home exercise program within a few weeks. · Compliance with Program/Exercises: Will assess as treatment progresses. · Recommendations/Intent for next treatment session: \"Next visit will focus on advancements to more challenging activities\".    Total Treatment Duration:  PT Patient Time In/Time Out  Time In: 1000  Time Out: [address] PT

## 2018-06-07 ENCOUNTER — HOSPITAL ENCOUNTER (OUTPATIENT)
Dept: PHYSICAL THERAPY | Age: 78
Discharge: HOME | End: 2018-06-07
Payer: MEDICARE

## 2018-06-07 PROCEDURE — 97110 THERAPEUTIC EXERCISES: CPT

## 2018-06-07 NOTE — PROGRESS NOTES
Micaela Lui  : 1940  Payor: [address] [place] [address] / Plan: Joseline Monroe MEDICARE COMPLETE / Product Type: Managed Care Medicare /  [facility] [place]  at [facility]  [address], [address]  [phone]   [phone]      OUTPATIENT PHYSICAL THERAPY:Daily Note 2018    ICD-10: Treatment Diagnosis:   R26.2 Difficulty in walking, not elsewhere classified     M25.662 Stiffness of left knee, not elsewhere classified     M25.562 Pain in left knee   Precautions/Allergies:   Review of patient's allergies indicates no known allergies. Fall Risk Score: 3 (? 5 = High Risk)  MD Orders: Evaluate and treat MEDICAL/REFERRING DIAGNOSIS:  Presence of left artificial knee joint [Z96.652]   DATE OF ONSET: 2018  REFERRING PHYSICIAN: Matti Pathak, *  RETURN PHYSICIAN APPOINTMENT: TBD     INITIAL ASSESSMENT:  Ms. Tenisha Johnson reported a L TKA on 2018 and has been receiving home physical therapy. She has been referred to outpatient physical therapy for continued rehabilitation. She ambualtes with a cane a L LE limp. PROBLEM LIST (Impacting functional limitations):  1. Decreased Strength  2. Decreased ADL/Functional Activities  3. Increased Pain  4. Decreased Activity Tolerance INTERVENTIONS PLANNED:  1. Home Exercise Program (HEP)  2. Manual Therapy  3. Neuromuscular Re-education/Strengthening  4. Range of Motion (ROM)  5. Therapeutic Exercise/Strengthening   TREATMENT PLAN:  Effective Dates: 2018 TO 2018. Frequency/Duration: 2 times a week for 90 days  GOALS: (Goals have been discussed and agreed upon with patient.)  SHORT-TERM FUNCTIONAL GOALS: Time Frame: 30 days  1. Increase LEFS 4 points to decrease pain 1-2 levels. 2. Increase L knee AROM 10 degrees to decrease pain 1-2 levels. 3. Increase L LE strength to allow initiation of stairs. DISCHARGE GOALS: Time Frame: 90 days  1.  Increase LEFS 9 points to decrease pain +2 levels. 2. Increase l LE strength to allow reciprocal stairs. 3. Demonstrate independence in home exercises to allow DC from physical therapy. Rehabilitation Potential For Stated Goals: Good  Regarding [de-identified] M Alphonso's therapy, I certify that the treatment plan above will be carried out by a therapist or under their direction. Thank you for this referral,  Timmy Conway., PT                 The information in this section was collected on 5/23/2018 (except where otherwise noted). HISTORY:   History of Present Injury/Illness (Reason for Referral): The patient reported a L TKA on April 23, 2018 and has been receiving home physical therapy. She has been referred to outpatient physical therapy for continued rehabilitation. She ambualtes with a cane a L LE limp. Past Medical History/Comorbidities:   Ms. Rochelle Martin  has a past medical history of Asthma with COPD ([facility] [address] CAD (coronary artery disease); Environmental and seasonal allergies; H/O echocardiogram (11/14/2016); History of nuclear stress test (12/04/2017); Graves' disease; Hyperlipidemia (11/16/2012); Hypertension; Menopause; Obesity (BMI 30-39.9); Osteoarthritis; Routine health maintenance (11/16/2012); S/P PTCA (percutaneous transluminal coronary angioplasty) (2012); Shortness of breath (05/10/2016); Status post total left knee replacement (4/23/2018); Status post total right knee replacement (2/1/2016); Thyroid disease; Tremor (11/16/2012); and Ulcerative colitis ([facility] [address] (11/7/2013). She also has no past medical history of Adverse effect of anesthesia; Difficult intubation; Malignant hyperthermia due to anesthesia; Nausea & vomiting; Pseudocholinesterase deficiency; or Unspecified adverse effect of anesthesia.   Ms. Rochelle Martin  has a past surgical history that includes hx lap cholecystectomy (2002); hx hysterectomy (1983); hx heent (1981); hx tonsil and adenoidectomy (1959); hx heent; hx ptca (2012); hx knee replacement (Right, 02/01/2016); hx cataract removal (Left, 08/15/2016); and hx ankle fracture tx (Right, 1956). Social History/Living Environment:      Prior Level of Function/Work/Activity:  Retired  Dominant Side:         RIGHT  Current Medications:       Current Outpatient Prescriptions:     docusate sodium (COLACE) 100 mg capsule, Take 100 mg by mouth three (3) times daily as needed for Constipation. , Disp: , Rfl:     DULCOLAX, BISACODYL, PO, Take 100 mg by mouth as needed (constipation). , Disp: , Rfl:     HYDROmorphone (DILAUDID) 2 mg tablet, Take 1 Tab by mouth every four (4) hours as needed. Max Daily Amount: 12 mg., Disp: 40 Tab, Rfl: 0    acetaminophen (TYLENOL EXTRA STRENGTH) 500 mg tablet, Take  by mouth every six (6) hours as needed for Pain., Disp: , Rfl:     cholecalciferol, vitamin D3, (VITAMIN D3) 2,000 unit tab, Take  by mouth daily. , Disp: , Rfl:     losartan (COZAAR) 100 mg tablet, TAKE 1 TABLET BY MOUTH  DAILY FOR HYPERTENSION, Disp: 90 Tab, Rfl: 3    levothyroxine (SYNTHROID) 137 mcg tablet, Take 137 mcg by mouth Daily (before breakfast). , Disp: 90 Tab, Rfl: 3    metoprolol succinate (TOPROL-XL) 25 mg XL tablet, Take 1 Tab by mouth daily. , Disp: 90 Tab, Rfl: 3    simvastatin (ZOCOR) 40 mg tablet, Take 1 Tab by mouth nightly. (Patient taking differently: Take 40 mg by mouth daily.), Disp: 90 Tab, Rfl: 3    albuterol (PROVENTIL HFA, VENTOLIN HFA, PROAIR HFA) 90 mcg/actuation inhaler, Take 2 Puffs by inhalation every four (4) hours as needed for Wheezing., Disp: 3 Inhaler, Rfl: 3    fluticasone (FLONASE) 50 mcg/actuation nasal spray, 2 Sprays by Both Nostrils route daily. , Disp: 3 Bottle, Rfl: 3    multivitamin (DAILY MULTIPLE) tablet, Take 1 Tab by mouth daily.  Hold 7 days prior to surgery per anesthesia protocol, Disp: , Rfl:    Date Last Reviewed:  6/7/2018   Number of Personal Factors/Comorbidities that affect the Plan of Care: 1-2: MODERATE COMPLEXITY   EXAMINATION:     Observation/Orthostatic Postural Assessment: Ambulation with a cane and L LE limp. Palpation:   Tenderness around her L knee incision. ROM: AROM : KNEE    R        L   Extension          0        0   Flexion         +125   +100                    Strength:    +3/5 L LE Strength   +4/5 R LR Strength           Special Tests: N/A  Neurological Screen: N/A  Functional Mobility: Independent with a  cane and L LE limp. Balance:  Good. Body Structures Involved:  1. Joints  2. Muscles Body Functions Affected:  1. Sensory/Pain  2. Neuromusculoskeletal  3. Movement Related Activities and Participation Affected:  1. General Tasks and Demands  2. Mobility  3. Community, Social and [place]   Number of elements (examined above) that affect the Plan of Care: 3: MODERATE COMPLEXITY   CLINICAL PRESENTATION:   Presentation: Evolving clinical presentation with changing clinical characteristics: MODERATE COMPLEXITY   CLINICAL DECISION MAKING:   Outcome Measure: Tool Used: Lower Extremity Functional Scale (LEFS)  Score:  Initial: 45/80 Most Recent: X/80 (Date: -- )   Interpretation of Score: 20 questions each scored on a 5 point scale with 0 representing \"extreme difficulty or unable to perform\" and 4 representing \"no difficulty\". The lower the score, the greater the functional disability. 80/80 represents no disability. Minimal detectable change is 9 points. Score 80 79-63 62-48 47-32 31-16 15-1 0   Modifier CH CI CJ CK CL CM CN     ? Mobility - Walking and Moving Around:     - CURRENT STATUS: CK - 40%-59% impaired, limited or restricted    - GOAL STATUS: CJ - 20%-39% impaired, limited or restricted    - D/C STATUS:  ---------------To be determined---------------    Medical Necessity:   · Patient demonstrates good rehab potential due to higher previous functional level.   Reason for Services/Other Comments:  · Patient continues to require skilled intervention due to her limited ability to ambulate with a normal gait pattern and perform ADLs in weight bearing. .   Use of outcome tool(s) and clinical judgement create a POC that gives a: Questionable prediction of patient's progress: MODERATE COMPLEXITY            TREATMENT:   (In addition to Assessment/Re-Assessment sessions the following treatments were rendered)  Pre-treatment Symptoms/Complaints:  The patient reported being able to attend her grandson's high school graduation, but was fatigued following the activities. Pain: Initial:   Pain Intensity 1: 4  Post Session:  3   Therapeutic Exercise: ( 60): The patient received treatment as below to improve mobility, strength and balance. Required moderate verbal and manual cues to promote proper body alignment, promote proper body posture and promote proper body mechanics. Progressed resistance, range and repetitions as indicated. The patient received exercise instruction followed by patient demonstration of the exercises below along with hamstring and heel cord stretching to stabilize the patient's LE and improve AROM to decrease spasms, pain, and improve function.    Date:  5/23/2018 Date:  5/25/2018 Date:  5/29/2018 Date:  6/1/2018 Date:  6/4/2018 Date:  6/7/2018    Activity/Exercise Parameters Parameters Parameters       MAT  EXERCISES:          Hip ER / Abduction          Hip Adduction (Ball Squeeze)          Bridging          Straight Leg Raise          SAQ          Hip Abduction (Sidelying)          Gluteus Medius (Clam Shell)          Sit To Stand                    STANDING HIP MACHINE:          Hip Abduction [email or web address] x 10(B) [email or web address] x 10(B) [email or web address] x 15(B) [email or web address] x 15(B) [email or web address] x 15(B) [email or web address] x 15(B)    Hip Flexion [email or web address] x 10(B)     [email or web address] x 10(B)     [email or web address] x 15(B) [email or web address] x 15(B) [email or web address] x 15(B) [email or web address] x 15(B)    Hip Extension [email or web address] x 10(B) [email or web address] x 10(B) [email or web address] x 15(B) [email or web address] x 15(B) [email or web address] x 15(B) [email or web address] x 15(B)    Hip Adduction          Standing Knee Extension     [email or web address] x 15(B) [email or web address] x 15(B)    4 Way Cable Walkout          Gluteus Medius (Crab Walk)          Knee Extension    [email or web address] x 15(B) [email or web address] x 15(B) [email or web address] x 15(B)    Knee Flexion [email or web address] x 10(B) [email or web address] x 10(B) [email or web address] x 15(B) [email or web address] x 15(B) [email or web address] x 15(B) [email or web address] x 15(B)    Step Downs   [email or web address] x 15(B)                 Stationary Bike 10 min 10 min 10 min 10 min 10 min 10 min    Treadmill     5 min     Stepper 10 min 10 min 10 min 10 min 10 min 15 min              FLEXIBILITY:          Heel Cord 3 min 3 min 3 min 3 min 3 min 3 min    Hamstring 3 min 3 min 3 min 3 min 3 min 3 min    Hip Flexors                      MedBridge Portal  Evaluation: (  )  Manual Therapy (     ):   Therapeutic Modalities:                                                                                               HEP: As above; handouts given to patient for all exercises. Treatment/Session Assessment:    · Response to Treatment:  The patient tolerated today's treatment without symptom exacerbation. The patient demonstrated good exercise tolerance and continued to require manual and verbal cuing for proper technique. .  The patient should progress to an independent home exercise program within a few weeks. · Compliance with Program/Exercises: Will assess as treatment progresses. · Recommendations/Intent for next treatment session: \"Next visit will focus on advancements to more challenging activities\".    Total Treatment Duration:  PT Patient Time In/Time Out  Time In: 1000  Time Out: [address] PT

## 2018-06-11 ENCOUNTER — HOSPITAL ENCOUNTER (OUTPATIENT)
Dept: PHYSICAL THERAPY | Age: 78
Discharge: HOME | End: 2018-06-11
Payer: MEDICARE

## 2018-06-11 PROCEDURE — 97110 THERAPEUTIC EXERCISES: CPT

## 2018-06-11 NOTE — PROGRESS NOTES
Tamika Herndon  : 1940  Payor: Marcos Rodriguez / Plan: Winifred Sheldon MEDICARE COMPLETE / Product Type: Managed Care Medicare /  [facility] [place]  at [facility]  [address], [address]  [phone]   [phone]      OUTPATIENT PHYSICAL THERAPY:Daily Note 2018    ICD-10: Treatment Diagnosis:   R26.2 Difficulty in walking, not elsewhere classified     M25.662 Stiffness of left knee, not elsewhere classified     M25.562 Pain in left knee   Precautions/Allergies:   Review of patient's allergies indicates no known allergies. Fall Risk Score: 3 (? 5 = High Risk)  MD Orders: Evaluate and treat MEDICAL/REFERRING DIAGNOSIS:  Presence of left artificial knee joint [Z96.652]   DATE OF ONSET: 2018  REFERRING PHYSICIAN: Michelle Eli., *  RETURN PHYSICIAN APPOINTMENT: TBD     INITIAL ASSESSMENT:  Ms. Saud Pabon reported a L TKA on 2018 and has been receiving home physical therapy. She has been referred to outpatient physical therapy for continued rehabilitation. She ambualtes with a cane a L LE limp. PROBLEM LIST (Impacting functional limitations):  1. Decreased Strength  2. Decreased ADL/Functional Activities  3. Increased Pain  4. Decreased Activity Tolerance INTERVENTIONS PLANNED:  1. Home Exercise Program (HEP)  2. Manual Therapy  3. Neuromuscular Re-education/Strengthening  4. Range of Motion (ROM)  5. Therapeutic Exercise/Strengthening   TREATMENT PLAN:  Effective Dates: 2018 TO 2018. Frequency/Duration: 2 times a week for 90 days  GOALS: (Goals have been discussed and agreed upon with patient.)  SHORT-TERM FUNCTIONAL GOALS: Time Frame: 30 days  1. Increase LEFS 4 points to decrease pain 1-2 levels. 2. Increase L knee AROM 10 degrees to decrease pain 1-2 levels. 3. Increase L LE strength to allow initiation of stairs. DISCHARGE GOALS: Time Frame: 90 days  1.  Increase LEFS 9 points to decrease pain +2 levels. 2. Increase l LE strength to allow reciprocal stairs. 3. Demonstrate independence in home exercises to allow DC from physical therapy. Rehabilitation Potential For Stated Goals: Good  Regarding [de-identified] M Alphonso's therapy, I certify that the treatment plan above will be carried out by a therapist or under their direction. Thank you for this referral,                   The information in this section was collected on 5/23/2018 (except where otherwise noted). HISTORY:   History of Present Injury/Illness (Reason for Referral): The patient reported a L TKA on April 23, 2018 and has been receiving home physical therapy. She has been referred to outpatient physical therapy for continued rehabilitation. She ambualtes with a cane a L LE limp. Past Medical History/Comorbidities:   Ms. Sammy Koenig  has a past medical history of Asthma with COPD ([facility] [address] CAD (coronary artery disease); Environmental and seasonal allergies; H/O echocardiogram (11/14/2016); History of nuclear stress test (12/04/2017); Graves' disease; Hyperlipidemia (11/16/2012); Hypertension; Menopause; Obesity (BMI 30-39.9); Osteoarthritis; Routine health maintenance (11/16/2012); S/P PTCA (percutaneous transluminal coronary angioplasty) (2012); Shortness of breath (05/10/2016); Status post total left knee replacement (4/23/2018); Status post total right knee replacement (2/1/2016); Thyroid disease; Tremor (11/16/2012); and Ulcerative colitis ([facility] [address] (11/7/2013). She also has no past medical history of Adverse effect of anesthesia; Difficult intubation; Malignant hyperthermia due to anesthesia; Nausea & vomiting; Pseudocholinesterase deficiency; or Unspecified adverse effect of anesthesia.   Ms. Sammy Koenig  has a past surgical history that includes hx lap cholecystectomy (2002); hx hysterectomy (1983); hx heent (1981); hx tonsil and adenoidectomy (1959); hx heent; hx ptca (2012); hx knee replacement (Right, 02/01/2016); hx cataract removal (Left, 08/15/2016); and hx ankle fracture tx (Right, 1956). Social History/Living Environment:      Prior Level of Function/Work/Activity:  Retired  Dominant Side:         RIGHT  Current Medications:       Current Outpatient Prescriptions:     docusate sodium (COLACE) 100 mg capsule, Take 100 mg by mouth three (3) times daily as needed for Constipation. , Disp: , Rfl:     DULCOLAX, BISACODYL, PO, Take 100 mg by mouth as needed (constipation). , Disp: , Rfl:     HYDROmorphone (DILAUDID) 2 mg tablet, Take 1 Tab by mouth every four (4) hours as needed. Max Daily Amount: 12 mg., Disp: 40 Tab, Rfl: 0    acetaminophen (TYLENOL EXTRA STRENGTH) 500 mg tablet, Take  by mouth every six (6) hours as needed for Pain., Disp: , Rfl:     cholecalciferol, vitamin D3, (VITAMIN D3) 2,000 unit tab, Take  by mouth daily. , Disp: , Rfl:     losartan (COZAAR) 100 mg tablet, TAKE 1 TABLET BY MOUTH  DAILY FOR HYPERTENSION, Disp: 90 Tab, Rfl: 3    levothyroxine (SYNTHROID) 137 mcg tablet, Take 137 mcg by mouth Daily (before breakfast). , Disp: 90 Tab, Rfl: 3    metoprolol succinate (TOPROL-XL) 25 mg XL tablet, Take 1 Tab by mouth daily. , Disp: 90 Tab, Rfl: 3    simvastatin (ZOCOR) 40 mg tablet, Take 1 Tab by mouth nightly. (Patient taking differently: Take 40 mg by mouth daily.), Disp: 90 Tab, Rfl: 3    albuterol (PROVENTIL HFA, VENTOLIN HFA, PROAIR HFA) 90 mcg/actuation inhaler, Take 2 Puffs by inhalation every four (4) hours as needed for Wheezing., Disp: 3 Inhaler, Rfl: 3    fluticasone (FLONASE) 50 mcg/actuation nasal spray, 2 Sprays by Both Nostrils route daily. , Disp: 3 Bottle, Rfl: 3    multivitamin (DAILY MULTIPLE) tablet, Take 1 Tab by mouth daily.  Hold 7 days prior to surgery per anesthesia protocol, Disp: , Rfl:    Date Last Reviewed:  6/11/2018   Number of Personal Factors/Comorbidities that affect the Plan of Care: 1-2: MODERATE COMPLEXITY   EXAMINATION:     Observation/Orthostatic Postural Assessment:   Ambulation with a cane and L LE limp.  Palpation:   Tenderness around her L knee incision. ROM: AROM : KNEE    R        L   Extension          0        0   Flexion         +125   +100                    Strength:    +3/5 L LE Strength   +4/5 R LR Strength           Special Tests: N/A  Neurological Screen: N/A  Functional Mobility: Independent with a  cane and L LE limp. Balance:  Good. Body Structures Involved:  1. Joints  2. Muscles Body Functions Affected:  1. Sensory/Pain  2. Neuromusculoskeletal  3. Movement Related Activities and Participation Affected:  1. General Tasks and Demands  2. Mobility  3. Community, Social and [place]   Number of elements (examined above) that affect the Plan of Care: 3: MODERATE COMPLEXITY   CLINICAL PRESENTATION:   Presentation: Evolving clinical presentation with changing clinical characteristics: MODERATE COMPLEXITY   CLINICAL DECISION MAKING:   Outcome Measure: Tool Used: Lower Extremity Functional Scale (LEFS)  Score:  Initial: 45/80 Most Recent: X/80 (Date: -- )   Interpretation of Score: 20 questions each scored on a 5 point scale with 0 representing \"extreme difficulty or unable to perform\" and 4 representing \"no difficulty\". The lower the score, the greater the functional disability. 80/80 represents no disability. Minimal detectable change is 9 points. Score 80 79-63 62-48 47-32 31-16 15-1 0   Modifier CH CI CJ CK CL CM CN     ? Mobility - Walking and Moving Around:     - CURRENT STATUS: CK - 40%-59% impaired, limited or restricted    - GOAL STATUS: CJ - 20%-39% impaired, limited or restricted    - D/C STATUS:  ---------------To be determined---------------    Medical Necessity:   · Patient demonstrates good rehab potential due to higher previous functional level. Reason for Services/Other Comments:  · Patient continues to require skilled intervention due to her limited ability to ambulate with a normal gait pattern and perform ADLs in weight bearing. .   Use of outcome tool(s) and clinical judgement create a POC that gives a: Questionable prediction of patient's progress: MODERATE COMPLEXITY            TREATMENT:   (In addition to Assessment/Re-Assessment sessions the following treatments were rendered)  Pre-treatment Symptoms/Complaints: Patient reports knee feels stiff and sore today. Pain: Initial:   Pain Intensity 1: 4  Post Session:  3   Therapeutic Exercise: ( 60): The patient received treatment as below to improve mobility, strength and balance. Required moderate verbal and manual cues to promote proper body alignment, promote proper body posture and promote proper body mechanics. Progressed resistance, range and repetitions as indicated. The patient received exercise instruction followed by patient demonstration of the exercises below along with hamstring and heel cord stretching to stabilize the patient's LE and improve AROM to decrease spasms, pain, and improve function.    Date:  6/4/2018 Date:  6/7/2018 Date  6-11-18   Activity/Exercise      MAT  EXERCISES:      Hip ER / Abduction      Hip Adduction (Ball Squeeze)      Bridging      Straight Leg Raise      SAQ      Hip Abduction (Sidelying)      Gluteus Medius (Clam Shell)      Sit To Stand   2 x 10         STANDING HIP MACHINE:      Hip Abduction [email or web address] x 15(B) [email or web address] x 15(B) [email or web address] x 15(B)   Hip Flexion [email or web address] x 15(B) [email or web address] x 15(B) [email or web address] x 15(B)   Hip Extension [email or web address] x 15(B) [email or web address] x 15(B) [email or web address] x 15(B)   Hip Adduction      Standing Knee Extension [email or web address] x 15(B) [email or web address] x 15(B) [email or web address] x 15(B)   4 Way Cable Walkout      Gluteus Medius (Crab Walk)      Knee Extension [email or web address] x 15(B) [email or web address] x 15(B) [email or web address] x 15(B)   Knee Flexion [email or web address] x 15(B) [email or web address] x 15(B) [email or web address] x 15(B)   Step Downs            Stationary Bike 10 min 10 min 10 min   Treadmill 5 min     Stepper 10 min 15 min 15 min         FLEXIBILITY:      Heel Cord 3 min 3 min 3 min   Hamstring 3 min 3 min 3 min   Hip Flexors              [facility] Portal  Evaluation: (  )  Manual Therapy (     ): Therapeutic Modalities:                                                                                               HEP: As directed. Treatment/Session Assessment:    · Response to Treatment:  The patient tolerated today's treatment with mild discomfort. The patient demonstrated good effort with all exercise, but needs to continue strength and endurance. Patient seems pleased with her progress thus far and very complaint with home exercises. · Compliance with Program/Exercises: Will assess as treatment progresses. · Recommendations/Intent for next treatment session: \"Next visit will focus on advancements to more challenging activities\".    Total Treatment Duration:  PT Patient Time In/Time Out  Time In: 0200  Time Out: 205 [facility], [facility]

## 2018-06-14 ENCOUNTER — HOSPITAL ENCOUNTER (OUTPATIENT)
Dept: PHYSICAL THERAPY | Age: 78
Discharge: HOME | End: 2018-06-14
Payer: MEDICARE

## 2018-06-14 PROCEDURE — 97110 THERAPEUTIC EXERCISES: CPT

## 2018-06-18 ENCOUNTER — HOSPITAL ENCOUNTER (OUTPATIENT)
Dept: PHYSICAL THERAPY | Age: 78
Discharge: HOME | End: 2018-06-18
Payer: MEDICARE

## 2018-06-18 PROCEDURE — 97110 THERAPEUTIC EXERCISES: CPT

## 2018-06-18 NOTE — PROGRESS NOTES
Jasbir Canales  : 1940  Payor: Lon Ghosh / Plan: Michell Bermudez MEDICARE COMPLETE / Product Type: Managed Care Medicare /  [address] [place] [address]  at [place] [address] Therapy  [address], [address]  [phone]   [phone]      OUTPATIENT PHYSICAL THERAPY:Daily Note 2018    ICD-10: Treatment Diagnosis:   R26.2 Difficulty in walking, not elsewhere classified     M25.662 Stiffness of left knee, not elsewhere classified     M25.562 Pain in left knee   Precautions/Allergies:   Review of patient's allergies indicates no known allergies. Fall Risk Score: 3 (? 5 = High Risk)  MD Orders: Evaluate and treat MEDICAL/REFERRING DIAGNOSIS:  Presence of left artificial knee joint [Z96.652]   DATE OF ONSET: 2018  REFERRING PHYSICIAN: Omkar Em., *  RETURN PHYSICIAN APPOINTMENT: TBD     INITIAL ASSESSMENT:  Ms. Malaika Shah reported a L TKA on 2018 and has been receiving home physical therapy. She has been referred to outpatient physical therapy for continued rehabilitation. She ambualtes with a cane a L LE limp. PROBLEM LIST (Impacting functional limitations):  1. Decreased Strength  2. Decreased ADL/Functional Activities  3. Increased Pain  4. Decreased Activity Tolerance INTERVENTIONS PLANNED:  1. Home Exercise Program (HEP)  2. Manual Therapy  3. Neuromuscular Re-education/Strengthening  4. Range of Motion (ROM)  5. Therapeutic Exercise/Strengthening   TREATMENT PLAN:  Effective Dates: 2018 TO 2018. Frequency/Duration: 2 times a week for 90 days  GOALS: (Goals have been discussed and agreed upon with patient.)  SHORT-TERM FUNCTIONAL GOALS: Time Frame: 30 days  1. Increase LEFS 4 points to decrease pain 1-2 levels. 2. Increase L knee AROM 10 degrees to decrease pain 1-2 levels. 3. Increase L LE strength to allow initiation of stairs. DISCHARGE GOALS: Time Frame: 90 days  1.  Increase LEFS 9 points to decrease pain +2 levels. 2. Increase l LE strength to allow reciprocal stairs. 3. Demonstrate independence in home exercises to allow DC from physical therapy. Rehabilitation Potential For Stated Goals: Good  Regarding [de-identified] M Alphonso's therapy, I certify that the treatment plan above will be carried out by a therapist or under their direction. Thank you for this referral,                   The information in this section was collected on 5/23/2018 (except where otherwise noted). HISTORY:   History of Present Injury/Illness (Reason for Referral): The patient reported a L TKA on April 23, 2018 and has been receiving home physical therapy. She has been referred to outpatient physical therapy for continued rehabilitation. She ambualtes with a cane a L LE limp. Past Medical History/Comorbidities:   Ms. Tatyana Edmonds  has a past medical history of Asthma with COPD ([facility] [address] CAD (coronary artery disease); Environmental and seasonal allergies; H/O echocardiogram (11/14/2016); History of nuclear stress test (12/04/2017); Graves' disease; Hyperlipidemia (11/16/2012); Hypertension; Menopause; Obesity (BMI 30-39.9); Osteoarthritis; Routine health maintenance (11/16/2012); S/P PTCA (percutaneous transluminal coronary angioplasty) (2012); Shortness of breath (05/10/2016); Status post total left knee replacement (4/23/2018); Status post total right knee replacement (2/1/2016); Thyroid disease; Tremor (11/16/2012); and Ulcerative colitis ([facility] [address] (11/7/2013). She also has no past medical history of Adverse effect of anesthesia; Difficult intubation; Malignant hyperthermia due to anesthesia; Nausea & vomiting; Pseudocholinesterase deficiency; or Unspecified adverse effect of anesthesia.   Ms. Tatyana Edmonds  has a past surgical history that includes hx lap cholecystectomy (2002); hx hysterectomy (1983); hx heent (1981); hx tonsil and adenoidectomy (1959); hx heent; hx ptca (2012); hx knee replacement (Right, 02/01/2016); hx cataract removal (Left, 08/15/2016); and hx ankle fracture tx (Right, 1956). Social History/Living Environment:      Prior Level of Function/Work/Activity:  Retired  Dominant Side:         RIGHT  Current Medications:       Current Outpatient Prescriptions:     docusate sodium (COLACE) 100 mg capsule, Take 100 mg by mouth three (3) times daily as needed for Constipation. , Disp: , Rfl:     DULCOLAX, BISACODYL, PO, Take 100 mg by mouth as needed (constipation). , Disp: , Rfl:     HYDROmorphone (DILAUDID) 2 mg tablet, Take 1 Tab by mouth every four (4) hours as needed. Max Daily Amount: 12 mg., Disp: 40 Tab, Rfl: 0    acetaminophen (TYLENOL EXTRA STRENGTH) 500 mg tablet, Take  by mouth every six (6) hours as needed for Pain., Disp: , Rfl:     cholecalciferol, vitamin D3, (VITAMIN D3) 2,000 unit tab, Take  by mouth daily. , Disp: , Rfl:     losartan (COZAAR) 100 mg tablet, TAKE 1 TABLET BY MOUTH  DAILY FOR HYPERTENSION, Disp: 90 Tab, Rfl: 3    levothyroxine (SYNTHROID) 137 mcg tablet, Take 137 mcg by mouth Daily (before breakfast). , Disp: 90 Tab, Rfl: 3    metoprolol succinate (TOPROL-XL) 25 mg XL tablet, Take 1 Tab by mouth daily. , Disp: 90 Tab, Rfl: 3    simvastatin (ZOCOR) 40 mg tablet, Take 1 Tab by mouth nightly. (Patient taking differently: Take 40 mg by mouth daily.), Disp: 90 Tab, Rfl: 3    albuterol (PROVENTIL HFA, VENTOLIN HFA, PROAIR HFA) 90 mcg/actuation inhaler, Take 2 Puffs by inhalation every four (4) hours as needed for Wheezing., Disp: 3 Inhaler, Rfl: 3    fluticasone (FLONASE) 50 mcg/actuation nasal spray, 2 Sprays by Both Nostrils route daily. , Disp: 3 Bottle, Rfl: 3    multivitamin (DAILY MULTIPLE) tablet, Take 1 Tab by mouth daily.  Hold 7 days prior to surgery per anesthesia protocol, Disp: , Rfl:    Date Last Reviewed:  6/18/2018   Number of Personal Factors/Comorbidities that affect the Plan of Care: 1-2: MODERATE COMPLEXITY   EXAMINATION:     Observation/Orthostatic Postural Assessment:   Ambulation with a cane and L LE limp.  Palpation:   Tenderness around her L knee incision. ROM: AROM : KNEE    R        L   Extension          0        0   Flexion         +125   +100                    Strength:    +3/5 L LE Strength   +4/5 R LR Strength           Special Tests: N/A  Neurological Screen: N/A  Functional Mobility: Independent with a  cane and L LE limp. Balance:  Good. Body Structures Involved:  1. Joints  2. Muscles Body Functions Affected:  1. Sensory/Pain  2. Neuromusculoskeletal  3. Movement Related Activities and Participation Affected:  1. General Tasks and Demands  2. Mobility  3. Community, Social and [place]   Number of elements (examined above) that affect the Plan of Care: 3: MODERATE COMPLEXITY   CLINICAL PRESENTATION:   Presentation: Evolving clinical presentation with changing clinical characteristics: MODERATE COMPLEXITY   CLINICAL DECISION MAKING:   Outcome Measure: Tool Used: Lower Extremity Functional Scale (LEFS)  Score:  Initial: 45/80 Most Recent: X/80 (Date: -- )   Interpretation of Score: 20 questions each scored on a 5 point scale with 0 representing \"extreme difficulty or unable to perform\" and 4 representing \"no difficulty\". The lower the score, the greater the functional disability. 80/80 represents no disability. Minimal detectable change is 9 points. Score 80 79-63 62-48 47-32 31-16 15-1 0   Modifier CH CI CJ CK CL CM CN     ? Mobility - Walking and Moving Around:     - CURRENT STATUS: CK - 40%-59% impaired, limited or restricted    - GOAL STATUS: CJ - 20%-39% impaired, limited or restricted    - D/C STATUS:  ---------------To be determined---------------    Medical Necessity:   · Patient demonstrates good rehab potential due to higher previous functional level. Reason for Services/Other Comments:  · Patient continues to require skilled intervention due to her limited ability to ambulate with a normal gait pattern and perform ADLs in weight bearing. .   Use of outcome tool(s) and clinical judgement create a POC that gives a: Questionable prediction of patient's progress: MODERATE COMPLEXITY            TREATMENT:   (In addition to Assessment/Re-Assessment sessions the following treatments were rendered)  Pre-treatment Symptoms/Complaints: Patient reports knee feel tight, but not sore. Pain: Initial:   Pain Intensity 1: 3  Post Session:  2   Therapeutic Exercise: ( 60): The patient received treatment as below to improve mobility, strength and balance. Required moderate verbal and manual cues to promote proper body alignment, promote proper body posture and promote proper body mechanics. Progressed resistance, range and repetitions as indicated. The patient received exercise instruction followed by patient demonstration of the exercises below along with hamstring and heel cord stretching to stabilize the patient's LE and improve AROM to decrease spasms, pain, and improve function.    Date:  6/4/2018 Date:  6/7/2018 Date  6-11-18 Date  6-14-18 Date  6-18-18   Activity/Exercise        MAT  EXERCISES:        Hip ER / Abduction        Hip Adduction (Ball Squeeze)        Bridging        Straight Leg Raise        SAQ        Hip Abduction (Sidelying)        Gluteus Medius (Clam Shell)        Sit To Stand   2 x 10 2 x 10 2 x 10           STANDING HIP MACHINE:        Hip Abduction [email or web address] x 15(B) [email or web address] x 15(B) [email or web address] x 15(B) [email or web address] x 15(B) [email or web address] x 15(B)   Hip Flexion [email or web address] x 15(B) [email or web address] x 15(B) [email or web address] x 15(B) [email or web address] x 15(B) [email or web address] x 15(B)   Hip Extension [email or web address] x 15(B) [email or web address] x 15(B) [email or web address] x 15(B) [email or web address] x 15(B) [email or web address] x 15(B)   Hip Adduction        Standing Knee Extension [email or web address] x 15(B) [email or web address] x 15(B) [email or web address] x 15(B) [email or web address] x 15(B) [email or web address] x 15(B)   4 Way Cable Walkout        Gluteus Medius (Crab Walk)        Knee Extension [email or web address] x 15(B) [email or web address] x 15(B) [email or web address] x 15(B) [email or web address] x 15(B) [email or web address] x 15(B)   Knee Flexion [email or web address] x 15(B) [email or web address] x 15(B) [email or web address] x 15(B) [email or web address] x 15(B) [email or web address] x 15(B)   Step Downs        Step ups 6 inch    X 20 X 20   Stationary Bike 10 min 10 min 10 min     Treadmill 5 min       Stepper 10 min 15 min 15 min 10min 10 min           FLEXIBILITY:        Heel Cord 3 min 3 min 3 min 3 min 3 min   Hamstring 3 min 3 min 3 min 3 min 3 min   Hip Flexors                  [facility] Portal  Evaluation: (  )  Manual Therapy (     ):   Therapeutic Modalities:                                                                                               HEP: As directed. Treatment/Session Assessment:    · Response to Treatment:  The patient tolerated today's treatment with mild discomfort. Patient demonstrated good effort with all exercises. Patient continues to be pleased with progress and the fact that she was able to do some light yard work over the weekend. Instructed patient to continue home exercises as directed. · Compliance with Program/Exercises: Will assess as treatment progresses. · Recommendations/Intent for next treatment session: \"Next visit will focus on advancements to more challenging activities\".    Total Treatment Duration:  PT Patient Time In/Time Out  Time In: 1000  Time Out: [address] [place], [place]

## 2018-06-21 ENCOUNTER — HOSPITAL ENCOUNTER (OUTPATIENT)
Dept: PHYSICAL THERAPY | Age: 78
Discharge: HOME | End: 2018-06-21
Payer: MEDICARE

## 2018-06-21 PROCEDURE — G8979 MOBILITY GOAL STATUS: HCPCS

## 2018-06-21 PROCEDURE — G8978 MOBILITY CURRENT STATUS: HCPCS

## 2018-06-21 PROCEDURE — 97110 THERAPEUTIC EXERCISES: CPT

## 2018-06-22 NOTE — PROGRESS NOTES
Star Valadez  : 1940  Payor: Wing Davis / Plan: uN C[place] MEDICARE COMPLETE / Product Type: Managed Care Medicare /  [address] [place] [address]  at [place] [address] [facility]  [address], [address]  [phone]   [phone]      OUTPATIENT PHYSICAL THERAPY:Daily Note and Progress Report 2018    ICD-10: Treatment Diagnosis:   R26.2 Difficulty in walking, not elsewhere classified     M25.662 Stiffness of left knee, not elsewhere classified     M25.562 Pain in left knee   Precautions/Allergies:   Review of patient's allergies indicates no known allergies. Fall Risk Score: 3 (? 5 = High Risk)  MD Orders: Evaluate and treat MEDICAL/REFERRING DIAGNOSIS:  Presence of left artificial knee joint [Z96.652]   DATE OF ONSET: 2018  REFERRING PHYSICIAN: Nora Mercado., *  RETURN PHYSICIAN APPOINTMENT: TBD     INITIAL ASSESSMENT:  Ms. Jc Sosa reported a L TKA on 2018 and has been receiving home physical therapy. She has been referred to outpatient physical therapy for continued rehabilitation. She ambualtes with a cane a L LE limp. PROBLEM LIST (Impacting functional limitations):  1. Decreased Strength  2. Decreased ADL/Functional Activities  3. Increased Pain  4. Decreased Activity Tolerance INTERVENTIONS PLANNED:  1. Home Exercise Program (HEP)  2. Manual Therapy  3. Neuromuscular Re-education/Strengthening  4. Range of Motion (ROM)  5. Therapeutic Exercise/Strengthening   TREATMENT PLAN:  Effective Dates: 2018 TO 2018. Frequency/Duration: 2 times a week for 90 days  GOALS: (Goals have been discussed and agreed upon with patient.)  SHORT-TERM FUNCTIONAL GOALS: Time Frame: 30 days  1. Increase LEFS 4 points to decrease pain 1-2 levels. MET  2. Increase L knee AROM 10 degrees to decrease pain 1-2 levels. MET  3. Increase L LE strength to allow initiation of stairs. MET  DISCHARGE GOALS: Time Frame: 90 days  1.  Increase LEFS 9 points to decrease pain +2 levels. MET  2. Increase l LE strength to allow reciprocal stairs. MET WITH RAILING  3. Demonstrate independence in home exercises to allow DC from physical therapy. ONGOING  Rehabilitation Potential For Stated Goals: Good  Regarding Heidi Werner's therapy, I certify that the treatment plan above will be carried out by a therapist or under their direction. Thank you for this referral,                   The information in this section was collected on 5/23/2018 (except where otherwise noted). HISTORY:   History of Present Injury/Illness (Reason for Referral): The patient reported a L TKA on April 23, 2018 and has been receiving home physical therapy. She has been referred to outpatient physical therapy for continued rehabilitation. She ambualtes with a cane a L LE limp. Past Medical History/Comorbidities:   Ms. Malaika Shah  has a past medical history of Asthma with COPD ([address] CAD (coronary artery disease); Environmental and seasonal allergies; H/O echocardiogram (11/14/2016); History of nuclear stress test (12/04/2017); Graves' disease; Hyperlipidemia (11/16/2012); Hypertension; Menopause; Obesity (BMI 30-39.9); Osteoarthritis; Routine health maintenance (11/16/2012); S/P PTCA (percutaneous transluminal coronary angioplasty) (2012); Shortness of breath (05/10/2016); Status post total left knee replacement (4/23/2018); Status post total right knee replacement (2/1/2016); Thyroid disease; Tremor (11/16/2012); and Ulcerative colitis ([address] (11/7/2013). She also has no past medical history of Adverse effect of anesthesia; Difficult intubation; Malignant hyperthermia due to anesthesia; Nausea & vomiting; Pseudocholinesterase deficiency; or Unspecified adverse effect of anesthesia.   Ms. Malaika Shah  has a past surgical history that includes hx lap cholecystectomy (2002); hx hysterectomy (1983); hx heent (1981); hx tonsil and adenoidectomy (1959); hx heent; hx ptca (2012); hx knee replacement (Right, 02/01/2016); hx cataract removal (Left, 08/15/2016); and hx ankle fracture tx (Right, 1956). Social History/Living Environment:      Prior Level of Function/Work/Activity:  Retired  Dominant Side:         RIGHT  Current Medications:       Current Outpatient Prescriptions:     docusate sodium (COLACE) 100 mg capsule, Take 100 mg by mouth three (3) times daily as needed for Constipation. , Disp: , Rfl:     DULCOLAX, BISACODYL, PO, Take 100 mg by mouth as needed (constipation). , Disp: , Rfl:     HYDROmorphone (DILAUDID) 2 mg tablet, Take 1 Tab by mouth every four (4) hours as needed. Max Daily Amount: 12 mg., Disp: 40 Tab, Rfl: 0    acetaminophen (TYLENOL EXTRA STRENGTH) 500 mg tablet, Take  by mouth every six (6) hours as needed for Pain., Disp: , Rfl:     cholecalciferol, vitamin D3, (VITAMIN D3) 2,000 unit tab, Take  by mouth daily. , Disp: , Rfl:     losartan (COZAAR) 100 mg tablet, TAKE 1 TABLET BY MOUTH  DAILY FOR HYPERTENSION, Disp: 90 Tab, Rfl: 3    levothyroxine (SYNTHROID) 137 mcg tablet, Take 137 mcg by mouth Daily (before breakfast). , Disp: 90 Tab, Rfl: 3    metoprolol succinate (TOPROL-XL) 25 mg XL tablet, Take 1 Tab by mouth daily. , Disp: 90 Tab, Rfl: 3    simvastatin (ZOCOR) 40 mg tablet, Take 1 Tab by mouth nightly. (Patient taking differently: Take 40 mg by mouth daily.), Disp: 90 Tab, Rfl: 3    albuterol (PROVENTIL HFA, VENTOLIN HFA, PROAIR HFA) 90 mcg/actuation inhaler, Take 2 Puffs by inhalation every four (4) hours as needed for Wheezing., Disp: 3 Inhaler, Rfl: 3    fluticasone (FLONASE) 50 mcg/actuation nasal spray, 2 Sprays by Both Nostrils route daily. , Disp: 3 Bottle, Rfl: 3    multivitamin (DAILY MULTIPLE) tablet, Take 1 Tab by mouth daily.  Hold 7 days prior to surgery per anesthesia protocol, Disp: , Rfl:    Date Last Reviewed:  6/21/2018   Number of Personal Factors/Comorbidities that affect the Plan of Care: 1-2: MODERATE COMPLEXITY   EXAMINATION: Observation/Orthostatic Postural Assessment:   Ambulation without assistive device. Tendency to ambulate with L knee slightly flexed. Can correct with verbal cuing. Good step length and gait speed. Slight limp to L due to knee flexed. Decreaased weight shift to L. Corrects with verbal cuing. .  Palpation:   Tenderness around her L medial  k  ROM: AROM : KNEE    R        L   Extension          0        0   Flexion         +125   +115                    Strength:    4- to 4/5 L LE Strength   +4/5 R LR Strength           Special Tests: N/A  Neurological Screen: N/A  Functional Mobility: Independent without assistive device  Balance:  Good. Body Structures Involved:  1. Joints  2. Muscles Body Functions Affected:  1. Sensory/Pain  2. Neuromusculoskeletal  3. Movement Related Activities and Participation Affected:  1. General Tasks and Demands  2. Mobility  3. Community, Social and [place]   Number of elements (examined above) that affect the Plan of Care: 3: MODERATE COMPLEXITY   CLINICAL PRESENTATION:   Presentation: Evolving clinical presentation with changing clinical characteristics: MODERATE COMPLEXITY   CLINICAL DECISION MAKING:   Outcome Measure: Tool Used: Lower Extremity Functional Scale (LEFS)  Score:  Initial: 45/80 Most Recent: 57/80 (Date: 6/21/18 )   Interpretation of Score: 20 questions each scored on a 5 point scale with 0 representing \"extreme difficulty or unable to perform\" and 4 representing \"no difficulty\". The lower the score, the greater the functional disability. 80/80 represents no disability. Minimal detectable change is 9 points. Score 80 79-63 62-48 47-32 31-16 15-1 0   Modifier CH CI CJ CK CL CM CN     ?  Mobility - Walking and Moving Around:     - CURRENT STATUS: CJ - 20%-39% impaired, limited or restricted    - GOAL STATUS: CJ - 20%-39% impaired, limited or restricted    - D/C STATUS:  ---------------To be determined---------------    Medical Necessity: · Patient demonstrates good rehab potential due to higher previous functional level. Reason for Services/Other Comments:  · Patient continues to require skilled intervention due to her limited ability to ambulate with a normal gait pattern and perform ADLs in weight bearing. .   Use of outcome tool(s) and clinical judgement create a POC that gives a: Questionable prediction of patient's progress: MODERATE COMPLEXITY             TREATMENT:    (In addition to Assessment/Re-Assessment sessions the following treatments were rendered)  Pre-treatment Symptoms/Complaints: Patient reports knee feel tight, but not sore. Pain: Initial:   Pain Intensity 1: 2  Post Session:  2   Therapeutic Exercise: ( 60): The patient received treatment as below to improve mobility, strength and balance. Required moderate verbal and manual cues to promote proper body alignment, promote proper body posture and promote proper body mechanics. Progressed resistance, range and repetitions as indicated. The patient received exercise instruction followed by patient demonstration of the exercises below along with hamstring and heel cord stretching to stabilize the patient's LE and improve AROM to decrease spasms, pain, and improve function.    Date:  6/4/2018 Date:  6/7/2018 Date  6-11-18 Date  6-14-18 Date  6-18-18 Nadja Apple  6-21-18   Activity/Exercise         MAT  EXERCISES:         Hip ER / Abduction         Hip Adduction (Ball Squeeze)         Bridging         Straight Leg Raise         SAQ         Hip Abduction (Sidelying)         Gluteus Medius (Clam Shell)         Sit To Stand   2 x 10 2 x 10 2x10 2 x 10            STANDING HIP MACHINE:         Hip Abduction [email or web address] x 15(B) [email or web address] x 15(B) [email or web address] x 15(B) [email or web address] x 15(B) [email or web address]) 25@2 x 15(B)   Hip Flexion [email or web address] x 15(B) [email or web address] x 15(B) [email or web address] x 15(B) [email or web address] x 15(B) [email or web address]) 25@2 x 15(B)   Hip Extension [email or web address] x 15(B) [email or web address] x 15(B) [email or web address] x 15(B) [email or web address] x 15(B) [email or web address]) 50@2 x 15(B)   Hip Adduction Standing Knee Extension [email or web address] x 15(B) [email or web address] x 15(B) [email or web address] x 15(B) [email or web address] x 15(B) [email or web address]) 25@2 x 15(B)   4 Way Cable Walkout         Gluteus Medius (Crab Walk)         Knee Extension [email or web address] x 15(B) [email or web address] x 15(B) [email or web address] x 15(B) [email or web address] x 15(B) [email or web address]) 10@2 x 15(B)   Knee Flexion [email or web address] x 15(B) [email or web address] x 15(B) [email or web address] x 15(B) [email or web address] x 15(B) [email or web address]) 20@2 x 15(B)   Step Downs         Step ups 6 inch    X 20 x20 X 20   Stationary Bike 10 min 10 min 10 min      Treadmill 5 min        Stepper 10 min 15 min 15 min 10min 10 min  10 min            FLEXIBILITY:         Heel Cord 3 min 3 min 3 min 3 min 3 min  3 min   Hamstring 3 min 3 min 3 min 3 min 3 min  3 min   Hip Flexors                    [facility]  Evaluation: (  )  Manual Therapy (     ):   Therapeutic Modalities:                                                                                               HEP: As directed. Treatment/Session Assessment:    · Response to Treatment:  The patient tolerated today's treatment with minimal to no discomfort. Patient demonstrated good effort with all exercises. Pt exhibits good increase in ROM. Strength steadily improving though needs to work to increase quad strength to increase stability of L LE in gait and increase knee ext at heel strike and stance. Work to increase end range flexion to increase ease of activity at home. Patient continues to be pleased with progress and the fact that she was able to do some light yard work. Instructed patient to continue home exercises as directed. Plan DC soon. · Compliance with Program/Exercises: Doing exercises at home. · Recommendations/Intent for next treatment session: \"Next visit will focus on advancements to more challenging activities\".    Total Treatment Duration:  PT Patient Time In/Time Out  Time In: 0300  Time Out: 0400    Sampson aTo PT,

## 2018-06-25 ENCOUNTER — HOSPITAL ENCOUNTER (OUTPATIENT)
Dept: PHYSICAL THERAPY | Age: 78
Discharge: HOME | End: 2018-06-25
Payer: MEDICARE

## 2018-06-25 PROCEDURE — 97110 THERAPEUTIC EXERCISES: CPT

## 2018-06-25 NOTE — PROGRESS NOTES
Daryl Baires  : 1940  Payor: [address] [place] [address] / Plan: Shakir Carr MEDICARE COMPLETE / Product Type: Managed Care Medicare /  [address] [place] [address]  at [place] [address] [facility]  [address] [place] [address], [address]  [phone]   [phone]      OUTPATIENT PHYSICAL THERAPY:Daily Note 2018    ICD-10: Treatment Diagnosis:   R26.2 Difficulty in walking, not elsewhere classified     M25.662 Stiffness of left knee, not elsewhere classified     M25.562 Pain in left knee   Precautions/Allergies:   Review of patient's allergies indicates no known allergies. Fall Risk Score: 3 (? 5 = High Risk)  MD Orders: Evaluate and treat MEDICAL/REFERRING DIAGNOSIS:  Presence of left artificial knee joint [Z96.652]   DATE OF ONSET: 2018  REFERRING PHYSICIAN: Kayleen Joseph, *  RETURN PHYSICIAN APPOINTMENT: TBD     INITIAL ASSESSMENT:  Ms. Tiara Benz reported a L TKA on 2018 and has been receiving home physical therapy. She has been referred to outpatient physical therapy for continued rehabilitation. She ambualtes with a cane a L LE limp. PROBLEM LIST (Impacting functional limitations):  1. Decreased Strength  2. Decreased ADL/Functional Activities  3. Increased Pain  4. Decreased Activity Tolerance INTERVENTIONS PLANNED:  1. Home Exercise Program (HEP)  2. Manual Therapy  3. Neuromuscular Re-education/Strengthening  4. Range of Motion (ROM)  5. Therapeutic Exercise/Strengthening   TREATMENT PLAN:  Effective Dates: 2018 TO 2018. Frequency/Duration: 2 times a week for 90 days  GOALS: (Goals have been discussed and agreed upon with patient.)  SHORT-TERM FUNCTIONAL GOALS: Time Frame: 30 days  1. Increase LEFS 4 points to decrease pain 1-2 levels. MET  2. Increase L knee AROM 10 degrees to decrease pain 1-2 levels. MET  3. Increase L LE strength to allow initiation of stairs. MET  DISCHARGE GOALS: Time Frame: 90 days  1.  Increase LEFS 9 points to decrease pain +2 levels. MET  2. Increase l LE strength to allow reciprocal stairs. MET WITH RAILING  3. Demonstrate independence in home exercises to allow DC from physical therapy. ONGOING  Rehabilitation Potential For Stated Goals: Good  Regarding Heidi BRAR Declanandree's therapy, I certify that the treatment plan above will be carried out by a therapist or under their direction. Thank you for this referral,                   The information in this section was collected on 5/23/2018 (except where otherwise noted). HISTORY:   History of Present Injury/Illness (Reason for Referral): The patient reported a L TKA on April 23, 2018 and has been receiving home physical therapy. She has been referred to outpatient physical therapy for continued rehabilitation. She ambualtes with a cane a L LE limp. Past Medical History/Comorbidities:   Ms. Ijeoma Mario  has a past medical history of Asthma with COPD ([facility] CAD (coronary artery disease); Environmental and seasonal allergies; H/O echocardiogram (11/14/2016); History of nuclear stress test (12/04/2017); Graves' disease; Hyperlipidemia (11/16/2012); Hypertension; Menopause; Obesity (BMI 30-39.9); Osteoarthritis; Routine health maintenance (11/16/2012); S/P PTCA (percutaneous transluminal coronary angioplasty) (2012); Shortness of breath (05/10/2016); Status post total left knee replacement (4/23/2018); Status post total right knee replacement (2/1/2016); Thyroid disease; Tremor (11/16/2012); and Ulcerative colitis ([facility] (11/7/2013). She also has no past medical history of Adverse effect of anesthesia; Difficult intubation; Malignant hyperthermia due to anesthesia; Nausea & vomiting; Pseudocholinesterase deficiency; or Unspecified adverse effect of anesthesia.   Ms. Ijeoma Mario  has a past surgical history that includes hx lap cholecystectomy (2002); hx hysterectomy (1983); hx heent (1981); hx tonsil and adenoidectomy (1959); hx heent; hx ptca (2012); hx knee replacement (Right, 02/01/2016); hx cataract removal (Left, 08/15/2016); and hx ankle fracture tx (Right, 1956). Social History/Living Environment:      Prior Level of Function/Work/Activity:  Retired  Dominant Side:         RIGHT  Current Medications:       Current Outpatient Prescriptions:     docusate sodium (COLACE) 100 mg capsule, Take 100 mg by mouth three (3) times daily as needed for Constipation. , Disp: , Rfl:     DULCOLAX, BISACODYL, PO, Take 100 mg by mouth as needed (constipation). , Disp: , Rfl:     HYDROmorphone (DILAUDID) 2 mg tablet, Take 1 Tab by mouth every four (4) hours as needed. Max Daily Amount: 12 mg., Disp: 40 Tab, Rfl: 0    acetaminophen (TYLENOL EXTRA STRENGTH) 500 mg tablet, Take  by mouth every six (6) hours as needed for Pain., Disp: , Rfl:     cholecalciferol, vitamin D3, (VITAMIN D3) 2,000 unit tab, Take  by mouth daily. , Disp: , Rfl:     losartan (COZAAR) 100 mg tablet, TAKE 1 TABLET BY MOUTH  DAILY FOR HYPERTENSION, Disp: 90 Tab, Rfl: 3    levothyroxine (SYNTHROID) 137 mcg tablet, Take 137 mcg by mouth Daily (before breakfast). , Disp: 90 Tab, Rfl: 3    metoprolol succinate (TOPROL-XL) 25 mg XL tablet, Take 1 Tab by mouth daily. , Disp: 90 Tab, Rfl: 3    simvastatin (ZOCOR) 40 mg tablet, Take 1 Tab by mouth nightly. (Patient taking differently: Take 40 mg by mouth daily.), Disp: 90 Tab, Rfl: 3    albuterol (PROVENTIL HFA, VENTOLIN HFA, PROAIR HFA) 90 mcg/actuation inhaler, Take 2 Puffs by inhalation every four (4) hours as needed for Wheezing., Disp: 3 Inhaler, Rfl: 3    fluticasone (FLONASE) 50 mcg/actuation nasal spray, 2 Sprays by Both Nostrils route daily. , Disp: 3 Bottle, Rfl: 3    multivitamin (DAILY MULTIPLE) tablet, Take 1 Tab by mouth daily.  Hold 7 days prior to surgery per anesthesia protocol, Disp: , Rfl:    Date Last Reviewed:  6/25/2018   Number of Personal Factors/Comorbidities that affect the Plan of Care: 1-2: MODERATE COMPLEXITY   EXAMINATION:     Observation/Orthostatic Postural Assessment:   Ambulation without assistive device. Tendency to ambulate with L knee slightly flexed. Can correct with verbal cuing. Good step length and gait speed. Slight limp to L due to knee flexed. Decreaased weight shift to L. Corrects with verbal cuing. .  Palpation:   Tenderness around her L medial  k  ROM: AROM : KNEE    R        L   Extension          0        0   Flexion         +125   +115                    Strength:    4- to 4/5 L LE Strength   +4/5 R LR Strength           Special Tests: N/A  Neurological Screen: N/A  Functional Mobility: Independent without assistive device  Balance:  Good. Body Structures Involved:  1. Joints  2. Muscles Body Functions Affected:  1. Sensory/Pain  2. Neuromusculoskeletal  3. Movement Related Activities and Participation Affected:  1. General Tasks and Demands  2. Mobility  3. Community, Social and [place]   Number of elements (examined above) that affect the Plan of Care: 3: MODERATE COMPLEXITY   CLINICAL PRESENTATION:   Presentation: Evolving clinical presentation with changing clinical characteristics: MODERATE COMPLEXITY   CLINICAL DECISION MAKING:   Outcome Measure: Tool Used: Lower Extremity Functional Scale (LEFS)  Score:  Initial: 45/80 Most Recent: 57/80 (Date: 6/21/18 )   Interpretation of Score: 20 questions each scored on a 5 point scale with 0 representing \"extreme difficulty or unable to perform\" and 4 representing \"no difficulty\". The lower the score, the greater the functional disability. 80/80 represents no disability. Minimal detectable change is 9 points. Score 80 79-63 62-48 47-32 31-16 15-1 0   Modifier CH CI CJ CK CL CM CN     ?  Mobility - Walking and Moving Around:     - CURRENT STATUS: CJ - 20%-39% impaired, limited or restricted    - GOAL STATUS: CJ - 20%-39% impaired, limited or restricted    - D/C STATUS:  ---------------To be determined---------------    Medical Necessity:   · Patient demonstrates good rehab potential due to higher previous functional level. Reason for Services/Other Comments:  · Patient continues to require skilled intervention due to her limited ability to ambulate with a normal gait pattern and perform ADLs in weight bearing. .   Use of outcome tool(s) and clinical judgement create a POC that gives a: Questionable prediction of patient's progress: MODERATE COMPLEXITY             TREATMENT:    (In addition to Assessment/Re-Assessment sessions the following treatments were rendered)  Pre-treatment Symptoms/Complaints: Patient reports knee feel better since getting in the pool. Pain: Initial:   Pain Intensity 1: 1  Post Session:  1   Therapeutic Exercise: ( 60): The patient received treatment as below to improve mobility, strength and balance. Required moderate verbal and manual cues to promote proper body alignment, promote proper body posture and promote proper body mechanics. Progressed resistance, range and repetitions as indicated. The patient received exercise instruction followed by patient demonstration of the exercises below along with hamstring and heel cord stretching to stabilize the patient's LE and improve AROM to decrease spasms, pain, and improve function.    Date  6-11-18 Date  6-14-18 Date  6-18-18 Date  6-21-18 Date  6-25-18   Activity/Exercise        MAT  EXERCISES:        Hip ER / Abduction        Hip Adduction (Ball Squeeze)        Bridging        Straight Leg Raise        SAQ        Hip Abduction (Sidelying)        Gluteus Medius (Clam Shell)        Sit To Stand 2 x 10 2 x 10 2x10 2 x 10 2 x 10           STANDING HIP MACHINE:        Hip Abduction [email or web address] x 15(B) [email or web address] x 15(B) [email or web address]) 25@2 x 15(B) [email or web address] x 15(B)   Hip Flexion [email or web address] x 15(B) [email or web address] x 15(B) [email or web address]) 25@2 x 15(B) [email or web address] x 15(B)   Hip Extension [email or web address] x 15(B) [email or web address] x 15(B) [email or web address]) 50@2 x 15(B) [email or web address] x 15(B)   Hip Adduction        Standing Knee Extension [email or web address] x 15(B) [email or web address] x 15(B) [email or web address]) 25@2 x 15(B) [email or web address] x 15(B)   4 Way Cable Walkout        Gluteus Medius (Crab Walk)        Knee Extension [email or web address] x 15(B) [email or web address] x 15(B) [email or web address]) 10@2 x 15(B) [email or web address] x 15(B)   Knee Flexion [email or web address] x 15(B) [email or web address] x 15(B) [email or web address]) 20@2 x 15(B) [email or web address] x 15(B)   Step Downs        Step ups 6 inch  X 20 x20 X 20 X 20   Stationary Bike 10 min       Treadmill        Stepper 15 min 10min 10 min  10 min 10 min           FLEXIBILITY:        Heel Cord 3 min 3 min 3 min  3 min 3 min   Hamstring 3 min 3 min 3 min  3 min 3 min   Hip Flexors                  [facility] Portal  Evaluation: (  )  Manual Therapy (     ):   Therapeutic Modalities:                                                                                               HEP: As directed. Treatment/Session Assessment:    · Response to Treatment:  The patient tolerated today's treatment with minimal to no discomfort. Patient demonstrated good effort with all exercises. Patient continues to be pleased with progress and was pleased that she could get in and out of her daughter's pool over the weekend. Patient shows good improvement in ROM, but needs to continue to work on strength. Instructed patient to continue home exercises as directed. · Compliance with Program/Exercises: Doing exercises at home. · Recommendations/Intent for next treatment session: \"Next visit will focus on advancements to more challenging activities\".    Total Treatment Duration:  PT Patient Time In/Time Out  Time In: 0300  Time Out: [address] [place] [address]

## 2018-06-27 ENCOUNTER — HOSPITAL ENCOUNTER (OUTPATIENT)
Dept: PHYSICAL THERAPY | Age: 78
Discharge: HOME | End: 2018-06-27
Payer: MEDICARE

## 2018-06-27 PROCEDURE — 97110 THERAPEUTIC EXERCISES: CPT

## 2018-06-27 PROCEDURE — G8980 MOBILITY D/C STATUS: HCPCS

## 2018-06-27 PROCEDURE — G8979 MOBILITY GOAL STATUS: HCPCS

## 2018-06-27 NOTE — PROGRESS NOTES
Jovany Yuan  : 1940  Payor: Janae Correia / Plan: Tabithasa Gerbera MEDICARE COMPLETE / Product Type: Managed Care Medicare /  [address]  at [facility]  [address], [address]  [phone]   [phone]      OUTPATIENT PHYSICAL THERAPY:Daily Note and Discharge 2018    ICD-10: Treatment Diagnosis:   R26.2 Difficulty in walking, not elsewhere classified     M25.662 Stiffness of left knee, not elsewhere classified     M25.562 Pain in left knee   Precautions/Allergies:   Review of patient's allergies indicates no known allergies. Fall Risk Score: 3 (? 5 = High Risk)  MD Orders: Evaluate and treat MEDICAL/REFERRING DIAGNOSIS:  Presence of left artificial knee joint [Z96.652]   DATE OF ONSET: 2018  REFERRING PHYSICIAN: Cecilia Rodriguez., *  RETURN PHYSICIAN APPOINTMENT: TBD     INITIAL ASSESSMENT:  Ms. Norman Agee reported a L TKA on 2018 and has been receiving home physical therapy. She has been referred to outpatient physical therapy for continued rehabilitation. She ambualtes with a cane a L LE limp. PROBLEM LIST (Impacting functional limitations):  1. Decreased Strength  2. Decreased ADL/Functional Activities  3. Increased Pain  4. Decreased Activity Tolerance INTERVENTIONS PLANNED:  1. Home Exercise Program (HEP)  2. Manual Therapy  3. Neuromuscular Re-education/Strengthening  4. Range of Motion (ROM)  5. Therapeutic Exercise/Strengthening   TREATMENT PLAN:  Effective Dates: 2018 TO 2018. Frequency/Duration: 2 times a week for 90 days  GOALS: (Goals have been discussed and agreed upon with patient.)  SHORT-TERM FUNCTIONAL GOALS: Time Frame: 30 days  1. Increase LEFS 4 points to decrease pain 1-2 levels. MET  2. Increase L knee AROM 10 degrees to decrease pain 1-2 levels. MET  3. Increase L LE strength to allow initiation of stairs. MET  DISCHARGE GOALS: Time Frame: 90 days  1.  Increase LEFS 9 points to decrease pain +2 levels. MET  2. Increase l LE strength to allow reciprocal stairs. MET  3. Demonstrate independence in home exercises to allow DC from physical therapy. MET  Rehabilitation Potential For Stated Goals: Good  Regarding [de-identified] M Alphonso's therapy, I certify that the treatment plan above will be carried out by a therapist or under their direction. Thank you for this referral,                   The information in this section was collected on 5/23/2018 (except where otherwise noted). HISTORY:   History of Present Injury/Illness (Reason for Referral): The patient reported a L TKA on April 23, 2018 and has been receiving home physical therapy. She has been referred to outpatient physical therapy for continued rehabilitation. She ambualtes with a cane a L LE limp. Past Medical History/Comorbidities:   Ms. Camilla Coelho  has a past medical history of Asthma with COPD ([facility] [address] CAD (coronary artery disease); Environmental and seasonal allergies; H/O echocardiogram (11/14/2016); History of nuclear stress test (12/04/2017); Graves' disease; Hyperlipidemia (11/16/2012); Hypertension; Menopause; Obesity (BMI 30-39.9); Osteoarthritis; Routine health maintenance (11/16/2012); S/P PTCA (percutaneous transluminal coronary angioplasty) (2012); Shortness of breath (05/10/2016); Status post total left knee replacement (4/23/2018); Status post total right knee replacement (2/1/2016); Thyroid disease; Tremor (11/16/2012); and Ulcerative colitis ([facility] [address] (11/7/2013). She also has no past medical history of Adverse effect of anesthesia; Difficult intubation; Malignant hyperthermia due to anesthesia; Nausea & vomiting; Pseudocholinesterase deficiency; or Unspecified adverse effect of anesthesia.   Ms. Camilla Coelho  has a past surgical history that includes hx lap cholecystectomy (2002); hx hysterectomy (1983); hx heent (1981); hx tonsil and adenoidectomy (1959); hx heent; hx ptca (2012); hx knee replacement (Right, 02/01/2016); hx cataract removal (Left, 08/15/2016); and hx ankle fracture tx (Right, 1956). Social History/Living Environment:      Prior Level of Function/Work/Activity:  Retired  Dominant Side:         RIGHT  Current Medications:       Current Outpatient Prescriptions:     docusate sodium (COLACE) 100 mg capsule, Take 100 mg by mouth three (3) times daily as needed for Constipation. , Disp: , Rfl:     DULCOLAX, BISACODYL, PO, Take 100 mg by mouth as needed (constipation). , Disp: , Rfl:     HYDROmorphone (DILAUDID) 2 mg tablet, Take 1 Tab by mouth every four (4) hours as needed. Max Daily Amount: 12 mg., Disp: 40 Tab, Rfl: 0    acetaminophen (TYLENOL EXTRA STRENGTH) 500 mg tablet, Take  by mouth every six (6) hours as needed for Pain., Disp: , Rfl:     cholecalciferol, vitamin D3, (VITAMIN D3) 2,000 unit tab, Take  by mouth daily. , Disp: , Rfl:     losartan (COZAAR) 100 mg tablet, TAKE 1 TABLET BY MOUTH  DAILY FOR HYPERTENSION, Disp: 90 Tab, Rfl: 3    levothyroxine (SYNTHROID) 137 mcg tablet, Take 137 mcg by mouth Daily (before breakfast). , Disp: 90 Tab, Rfl: 3    metoprolol succinate (TOPROL-XL) 25 mg XL tablet, Take 1 Tab by mouth daily. , Disp: 90 Tab, Rfl: 3    simvastatin (ZOCOR) 40 mg tablet, Take 1 Tab by mouth nightly. (Patient taking differently: Take 40 mg by mouth daily.), Disp: 90 Tab, Rfl: 3    albuterol (PROVENTIL HFA, VENTOLIN HFA, PROAIR HFA) 90 mcg/actuation inhaler, Take 2 Puffs by inhalation every four (4) hours as needed for Wheezing., Disp: 3 Inhaler, Rfl: 3    fluticasone (FLONASE) 50 mcg/actuation nasal spray, 2 Sprays by Both Nostrils route daily. , Disp: 3 Bottle, Rfl: 3    multivitamin (DAILY MULTIPLE) tablet, Take 1 Tab by mouth daily.  Hold 7 days prior to surgery per anesthesia protocol, Disp: , Rfl:    Date Last Reviewed:  6/27/2018   Number of Personal Factors/Comorbidities that affect the Plan of Care: 1-2: MODERATE COMPLEXITY   EXAMINATION:     Observation/Orthostatic Postural Assessment:   Ambulation without assistive device. Tendency to ambulate with L knee slightly flexed. Can correct with verbal cuing. Good step length and gait speed. Slight limp to L due to knee flexed. Decreaased weight shift to L. Corrects with verbal cuing. .  Palpation:   Tenderness around her L medial  k  ROM: AROM : KNEE    R        L         L(6/25/18)   Extension          0        0              0   Flexion         +125   +115       +125                    Strength:    4- to 4/5 L LE Strength   +4/5 R LR Strength           Special Tests: N/A  Neurological Screen: N/A  Functional Mobility: Independent   Balance:  Good. Body Structures Involved:  1. Joints  2. Muscles Body Functions Affected:  1. Sensory/Pain  2. Neuromusculoskeletal  3. Movement Related Activities and Participation Affected:  1. General Tasks and Demands  2. Mobility  3. Community, Social and [place] Ferguson   Number of elements (examined above) that affect the Plan of Care: 3: MODERATE COMPLEXITY   CLINICAL PRESENTATION:   Presentation: Evolving clinical presentation with changing clinical characteristics: MODERATE COMPLEXITY   CLINICAL DECISION MAKING:   Outcome Measure: Tool Used: Lower Extremity Functional Scale (LEFS)  Score:  Initial: 45/80 Most Recent: 57/80 (Date: 6/27/18 )   Interpretation of Score: 20 questions each scored on a 5 point scale with 0 representing \"extreme difficulty or unable to perform\" and 4 representing \"no difficulty\". The lower the score, the greater the functional disability. 80/80 represents no disability. Minimal detectable change is 9 points. Score 80 79-63 62-48 47-32 31-16 15-1 0   Modifier CH CI CJ CK CL CM CN     ?  Mobility - Walking and Moving Around:     - CURRENT STATUS: CJ - 20%-39% impaired, limited or restricted    - GOAL STATUS: CJ - 20%-39% impaired, limited or restricted    - D/C STATUS:  CJ - 20%-39% impaired, limited or restricted    Medical Necessity:   · Patient demonstrates good rehab potential due to higher previous functional level. Reason for Services/Other Comments:  · Patient continues to require skilled intervention due to her limited ability to ambulate with a normal gait pattern and perform ADLs in weight bearing. .   Use of outcome tool(s) and clinical judgement create a POC that gives a: Questionable prediction of patient's progress: MODERATE COMPLEXITY             TREATMENT:    (In addition to Assessment/Re-Assessment sessions the following treatments were rendered)  Pre-treatment Symptoms/Complaints: Patient reports knee feel better    Pain: Initial:   Pain Intensity 1: 1  Post Session:  1   Therapeutic Exercise: ( 60): The patient received treatment as below to improve mobility, strength and balance. Required moderate verbal and manual cues to promote proper body alignment, promote proper body posture and promote proper body mechanics. Progressed resistance, range and repetitions as indicated. The patient received exercise instruction followed by patient demonstration of the exercises below along with hamstring and heel cord stretching to stabilize the patient's LE and improve AROM to decrease spasms, pain, and improve function.    Date  6-11-18 Date  6-14-18 Date  6-18-18 Date  6-21-18 Date  6-25-18 Date  6-27-18   Activity/Exercise         MAT  EXERCISES:         Hip ER / Abduction         Hip Adduction (Ball Squeeze)         Bridging         Straight Leg Raise         SAQ         Hip Abduction (Sidelying)         Gluteus Medius (Clam Shell)         Sit To Stand 2 x 10 2 x 10 2x10 2 x 10 2 x 10 3 x 10            STANDING HIP MACHINE:         Hip Abduction [email or web address] x 15(B) [email or web address] x 15(B) [email or web address]) 25@2 x 15(B) [email or web address] x 15(B) [email or web address] x 15(B)   Hip Flexion [email or web address] x 15(B) [email or web address] x 15(B) [email or web address]) 25@2 x 15(B) [email or web address] x 15(B) [email or web address] x 15(B)   Hip Extension [email or web address] x 15(B) [email or web address] x 15(B) [email or web address]) 50@2 x 15(B) [email or web address] x 15(B) [email or web address] x 15(B)   Hip Adduction         Standing Knee Extension [email or web address] x 15(B) [email or web address] x 15(B) [email or web address]) 25@2 x 15(B) [email or web address] x 15(B) [email or web address] x 15   4 Way Cable Walkout         Gluteus Medius (Crab Walk)         Knee Extension [email or web address] x 15(B) [email or web address] x 15(B) [email or web address]) 10@2 x 15(B) [email or web address] x 15(B) [email or web address] x 15(B)   Knee Flexion [email or web address] x 15(B) [email or web address] x 15(B) [email or web address]) 20@2 x 15(B) [email or web address] x 15(B) [email or web address] x 15(B)   Step Downs         Step ups 6 inch  X 20 x20 X 20 X 20 X 30   Stationary Bike 10 min        Treadmill         Stepper 15 min 10min 10 min  10 min 10 min 10 min            FLEXIBILITY:         Heel Cord 3 min 3 min 3 min  3 min 3 min 3 min   Hamstring 3 min 3 min 3 min  3 min 3 min 3 min   Hip Flexors                    MedBridge Portal  Evaluation: (  )  Manual Therapy (     ):   Therapeutic Modalities:                                                                                               HEP: As directed. Treatment/Session Assessment:    · Response to Treatment:  The patient tolerated today's treatment with minimal to no discomfort. Patient demonstrated good effort with all exercises. Patient continues to be pleased with progress and is ready to progress to a HEP. She has achieved all of her PT goals. · Recommendations/Intent for next treatment session: DC to a HEP.   Total Treatment Duration:  PT Patient Time In/Time Out  Time In: 1300  Time Out: [address] PT

## 2018-07-24 ENCOUNTER — HOSPITAL ENCOUNTER (EMERGENCY)
Age: 78
Discharge: HOME | End: 2018-07-24
Attending: STUDENT IN AN ORGANIZED HEALTH CARE EDUCATION/TRAINING PROGRAM
Payer: MEDICARE

## 2018-07-24 VITALS
DIASTOLIC BLOOD PRESSURE: 84 MMHG | SYSTOLIC BLOOD PRESSURE: 142 MMHG | TEMPERATURE: 97.5 F | OXYGEN SATURATION: 96 % | RESPIRATION RATE: 16 BRPM | HEART RATE: 68 BPM

## 2018-07-24 DIAGNOSIS — I10 ESSENTIAL HYPERTENSION: Primary | ICD-10-CM

## 2018-07-24 LAB
ALBUMIN SERPL-MCNC: 3.6 G/DL (ref 3.2–4.6)
ALBUMIN/GLOB SERPL: 1.1 {RATIO} (ref 1.2–3.5)
ALP SERPL-CCNC: 80 U/L (ref 50–136)
ALT SERPL-CCNC: 36 U/L (ref 12–65)
ANION GAP SERPL CALC-SCNC: 6 MMOL/L (ref 7–16)
AST SERPL-CCNC: 27 U/L (ref 15–37)
BASOPHILS # BLD: 0.1 K/UL (ref 0–0.2)
BASOPHILS NFR BLD: 1 % (ref 0–2)
BILIRUB SERPL-MCNC: 0.2 MG/DL (ref 0.2–1.1)
BUN SERPL-MCNC: 15 MG/DL (ref 8–23)
CALCIUM SERPL-MCNC: 9.1 MG/DL (ref 8.3–10.4)
CHLORIDE SERPL-SCNC: 101 MMOL/L (ref 98–107)
CO2 SERPL-SCNC: 31 MMOL/L (ref 21–32)
CREAT SERPL-MCNC: 0.76 MG/DL (ref 0.6–1)
DIFFERENTIAL METHOD BLD: ABNORMAL
EOSINOPHIL # BLD: 0.2 K/UL (ref 0–0.8)
EOSINOPHIL NFR BLD: 3 % (ref 0.5–7.8)
ERYTHROCYTE [DISTWIDTH] IN BLOOD BY AUTOMATED COUNT: 12.8 % (ref 11.9–14.6)
GLOBULIN SER CALC-MCNC: 3.4 G/DL (ref 2.3–3.5)
GLUCOSE SERPL-MCNC: 94 MG/DL (ref 65–100)
HCT VFR BLD AUTO: 41.8 % (ref 35.8–46.3)
HGB BLD-MCNC: 13.9 G/DL (ref 11.7–15.4)
IMM GRANULOCYTES # BLD: 0 K/UL (ref 0–0.5)
IMM GRANULOCYTES NFR BLD AUTO: 0 % (ref 0–5)
LYMPHOCYTES # BLD: 1.8 K/UL (ref 0.5–4.6)
LYMPHOCYTES NFR BLD: 22 % (ref 13–44)
MCH RBC QN AUTO: 28.8 PG (ref 26.1–32.9)
MCHC RBC AUTO-ENTMCNC: 33.3 G/DL (ref 31.4–35)
MCV RBC AUTO: 86.7 FL (ref 79.6–97.8)
MONOCYTES # BLD: 0.6 K/UL (ref 0.1–1.3)
MONOCYTES NFR BLD: 7 % (ref 4–12)
NEUTS SEG # BLD: 5.4 K/UL (ref 1.7–8.2)
NEUTS SEG NFR BLD: 67 % (ref 43–78)
PLATELET # BLD AUTO: 265 K/UL (ref 150–450)
PMV BLD AUTO: 10.5 FL (ref 10.8–14.1)
POTASSIUM SERPL-SCNC: 4 MMOL/L (ref 3.5–5.1)
PROT SERPL-MCNC: 7 G/DL (ref 6.3–8.2)
RBC # BLD AUTO: 4.82 M/UL (ref 4.05–5.25)
SODIUM SERPL-SCNC: 138 MMOL/L (ref 136–145)
WBC # BLD AUTO: 8 K/UL (ref 4.3–11.1)

## 2018-07-24 PROCEDURE — 93005 ELECTROCARDIOGRAM TRACING: CPT | Performed by: STUDENT IN AN ORGANIZED HEALTH CARE EDUCATION/TRAINING PROGRAM

## 2018-07-24 PROCEDURE — 99283 EMERGENCY DEPT VISIT LOW MDM: CPT | Performed by: STUDENT IN AN ORGANIZED HEALTH CARE EDUCATION/TRAINING PROGRAM

## 2018-07-24 PROCEDURE — 85025 COMPLETE CBC W/AUTO DIFF WBC: CPT | Performed by: STUDENT IN AN ORGANIZED HEALTH CARE EDUCATION/TRAINING PROGRAM

## 2018-07-24 PROCEDURE — 80053 COMPREHEN METABOLIC PANEL: CPT | Performed by: STUDENT IN AN ORGANIZED HEALTH CARE EDUCATION/TRAINING PROGRAM

## 2018-07-24 NOTE — ED TRIAGE NOTES
Pt accidentally forgot to take her Losartan for over a week. BP has been reading high since yesterday. No symptoms.

## 2018-07-24 NOTE — ED PROVIDER NOTES
HPI Comments: 78-year-old female patient presents with reports of elevated blood pressure of the pastoral days. Patient states she neglected to pack her blood pressure medication into her medication container and is been off of this medicine for at least a week. She started back last evening. She states she woke yesterday morning with a dull headache that resolved throughout the day. Her blood pressures done elevated slightly from her normal readings per home blood pressure cuff. She denies associated visual change, continued headache, nausea, vomiting, fever, chills, chest pain or shortness of breath. Patient arrives with a log of her blood pressure including numerous readings from the past 12 hours. She is currently asymptomatic. She states she took her normal dose of blood pressure medication this morning. Patient is a 77 y.o. female presenting with hypertension. The history is provided by the patient, a relative and the spouse. Hypertension    This is a new problem. Associated symptoms include headaches. Pertinent negatives include no chest pain, no neck pain, no dizziness, no shortness of breath, no nausea and no vomiting. Past Medical History:   Diagnosis Date    Asthma with COPD ([address]     Managed with daily and PRN inhaler     CAD (coronary artery disease)     05/2012: 3.0x 18 Xience MLAD, POBA D1, 11/2017: Normal stress MPI. Followed by [address] [address] [facility].  Environmental and seasonal allergies     H/O echocardiogram 11/14/2016    EF 71%    History of nuclear stress test 12/04/2017    EF: 70%    Hx of Graves' disease     Hyperlipidemia 11/16/2012    controlled with medication     Hypertension     controlled with medication     Menopause     Obesity (BMI 30-39. 9)     Osteoarthritis     Routine health maintenance 11/16/2012    S/P PTCA (percutaneous transluminal coronary angioplasty) 2012    Patient takes daily 81 mg ASA-Followed by [address] [facility]     Shortness of breath 05/10/2016    resolved-patient reports no recent SOB     Status post total left knee replacement 4/23/2018    Status post total right knee replacement 2/1/2016    Thyroid disease     Tremor 11/16/2012    hands     Ulcerative colitis ([address] 11/7/2013       Past Surgical History:   Procedure Laterality Date    HX ANKLE FRACTURE TX Right 1956    HX CATARACT REMOVAL Left 08/15/2016    HX HEENT  1981    thyroid radioactive iodine tx    HX HEENT      Bridge in Mouth    HX HYSTERECTOMY  1983    HX KNEE REPLACEMENT Right 02/01/2016    HX LAP CHOLECYSTECTOMY  2002    HX PTCA  2012    3.0x1.8 Xience mLAD POBA D1    HX TONSIL AND ADENOIDECTOMY  1959         Family History:   Problem Relation Age of Onset    Heart Disease Mother     Arthritis-osteo Father     Hypertension Sister     Diabetes Sister     Hypertension Brother     Hypertension Brother     Hypertension Brother     Cancer Brother     Hypertension Brother     Breast Cancer Paternal Aunt 79       Social History     Social History    Marital status:      Spouse name: N/A    Number of children: N/A    Years of education: N/A     Occupational History    Not on file. Social History Main Topics    Smoking status: Former Smoker     Packs/day: 2.00     Years: 10.00     Types: Cigarettes     Quit date: 7/5/1970    Smokeless tobacco: Never Used      Comment: social smoker 1-2 qd    Alcohol use Yes      Comment: occ,. social    Drug use: No    Sexual activity: Not on file     Other Topics Concern    Not on file     Social History Narrative         ALLERGIES: Review of patient's allergies indicates no known allergies. Review of Systems   Constitutional: Negative for chills, diaphoresis and fever. HENT: Negative for congestion, sneezing and sore throat. Eyes: Negative for visual disturbance. Respiratory: Negative for cough, chest tightness, shortness of breath and wheezing.     Cardiovascular: Negative for chest pain and leg swelling. Gastrointestinal: Negative for abdominal pain, blood in stool, diarrhea, nausea and vomiting. Endocrine: Negative for polyuria. Genitourinary: Negative for difficulty urinating, dysuria, flank pain, hematuria and urgency. Musculoskeletal: Negative for back pain, myalgias, neck pain and neck stiffness. Skin: Negative for color change and rash. Neurological: Positive for headaches. Negative for dizziness, syncope, speech difficulty, weakness, light-headedness and numbness. Psychiatric/Behavioral: Negative for behavioral problems. All other systems reviewed and are negative. Vitals:    07/24/18 1621   BP: 158/86   Pulse: 74   Resp: 18   Temp: 98 °F (36.7 °C)   SpO2: 96%            Physical Exam   Constitutional: She is oriented to person, place, and time. She appears well-developed and well-nourished. No distress. Slightly anxious appearing female patient, Alert and oriented to person, place and time. No acute distress. Speaks in clear, fluent sentences. HENT:   Head: Normocephalic and atraumatic. Nose: Nose normal.   Eyes: Conjunctivae and EOM are normal. Pupils are equal, round, and reactive to light. Neck: Normal range of motion. Neck supple. No JVD present. No tracheal deviation present. Cardiovascular: Normal rate, regular rhythm, S1 normal, S2 normal, normal heart sounds and intact distal pulses. Exam reveals no gallop, no distant heart sounds and no friction rub. No murmur heard. Pulmonary/Chest: Effort normal and breath sounds normal. No accessory muscle usage or stridor. No tachypnea and no bradypnea. No respiratory distress. She has no decreased breath sounds. She has no wheezes. She has no rhonchi. She has no rales. She exhibits no tenderness. Abdominal: Soft. Normal appearance. She exhibits no distension and no mass. There is no hepatosplenomegaly, splenomegaly or hepatomegaly. There is no tenderness.  There is no rigidity, no rebound, no guarding, no CVA tenderness, no tenderness at McBurney's point and negative Dotson's sign. Musculoskeletal: Normal range of motion. She exhibits no edema, tenderness or deformity. Neurological: She is alert and oriented to person, place, and time. No cranial nerve deficit. Skin: Skin is warm and dry. No rash noted. She is not diaphoretic. Psychiatric: She has a normal mood and affect. Her behavior is normal.   Nursing note and vitals reviewed. MDM  Number of Diagnoses or Management Options  Essential hypertension: new and requires workup  Diagnosis management comments: Patient is currently asymptomatic but does report a headache yesterday. This is since resolved. She has a history of a stent previously. Discussed option for a sick labs and EKG. Patient agreeable to this currently. Low suspicion for cardiac cause of patient's symptoms. Blood pressures improved on arrival.    Laboratory analysis and EKG are unremarkable. Patient continues to be asymptomatic at this time. Discussed importance of regular blood pressure checks and continued antihypertensive medication use. She will arrange follow-up with her cardiologist for discussion of her blood pressure medications. She agrees to return if symptoms worsen.        Amount and/or Complexity of Data Reviewed  Clinical lab tests: ordered and reviewed  Independent visualization of images, tracings, or specimens: yes    Risk of Complications, Morbidity, and/or Mortality  Presenting problems: moderate  Diagnostic procedures: low  Management options: moderate    Patient Progress  Patient progress: stable        ED Course       Procedures

## 2018-07-24 NOTE — ED NOTES
I have reviewed discharge instructions with the patient. The patient verbalized understanding. Patient left ED via Discharge Method: ambulatory to Home with family. Opportunity for questions and clarification provided. Patient given 0 scripts. To continue your aftercare when you leave the hospital, you may receive an automated call from our care team to check in on how you are doing. This is a free service and part of our promise to provide the best care and service to meet your aftercare needs.  If you have questions, or wish to unsubscribe from this service please call [phone]. Thank you for Choosing our [facility] Emergency Department.

## 2018-07-25 LAB
ATRIAL RATE: 60 BPM
CALCULATED P AXIS, ECG09: 66 DEGREES
CALCULATED R AXIS, ECG10: 46 DEGREES
CALCULATED T AXIS, ECG11: 74 DEGREES
DIAGNOSIS, 93000: NORMAL
P-R INTERVAL, ECG05: 192 MS
Q-T INTERVAL, ECG07: 408 MS
QRS DURATION, ECG06: 70 MS
QTC CALCULATION (BEZET), ECG08: 408 MS
VENTRICULAR RATE, ECG03: 60 BPM

## 2018-10-22 PROBLEM — I10 ESSENTIAL HYPERTENSION: Status: ACTIVE | Noted: 2018-10-22

## 2019-01-23 ENCOUNTER — HOSPITAL ENCOUNTER (OUTPATIENT)
Dept: MAMMOGRAPHY | Age: 79
Discharge: HOME | End: 2019-01-23
Attending: INTERNAL MEDICINE
Payer: MEDICARE

## 2019-01-23 DIAGNOSIS — Z12.31 VISIT FOR SCREENING MAMMOGRAM: ICD-10-CM

## 2019-01-23 PROCEDURE — 77067 SCR MAMMO BI INCL CAD: CPT

## 2019-08-29 ENCOUNTER — HOSPITAL ENCOUNTER (OUTPATIENT)
Dept: LAB | Age: 79
Discharge: HOME | End: 2019-08-29
Attending: INTERNAL MEDICINE
Payer: MEDICARE

## 2019-08-29 DIAGNOSIS — E78.5 DYSLIPIDEMIA: Chronic | ICD-10-CM

## 2019-08-29 LAB
ALBUMIN SERPL-MCNC: 3.3 G/DL (ref 3.2–4.6)
ALBUMIN/GLOB SERPL: 1 {RATIO} (ref 1.2–3.5)
ALP SERPL-CCNC: 82 U/L (ref 50–136)
ALT SERPL-CCNC: 38 U/L (ref 12–65)
ANION GAP SERPL CALC-SCNC: 7 MMOL/L (ref 7–16)
AST SERPL-CCNC: 32 U/L (ref 15–37)
BILIRUB SERPL-MCNC: 0.6 MG/DL (ref 0.2–1.1)
BUN SERPL-MCNC: 13 MG/DL (ref 8–23)
CALCIUM SERPL-MCNC: 8.8 MG/DL (ref 8.3–10.4)
CHLORIDE SERPL-SCNC: 107 MMOL/L (ref 98–107)
CHOLEST SERPL-MCNC: 133 MG/DL
CO2 SERPL-SCNC: 27 MMOL/L (ref 21–32)
CREAT SERPL-MCNC: 0.8 MG/DL (ref 0.6–1)
GLOBULIN SER CALC-MCNC: 3.3 G/DL (ref 2.3–3.5)
GLUCOSE SERPL-MCNC: 93 MG/DL (ref 65–100)
HDLC SERPL-MCNC: 50 MG/DL (ref 40–60)
HDLC SERPL: 2.7 {RATIO}
LDLC SERPL CALC-MCNC: 57 MG/DL
LIPID PROFILE,FLP: ABNORMAL
POTASSIUM SERPL-SCNC: 4.6 MMOL/L (ref 3.5–5.1)
PROT SERPL-MCNC: 6.6 G/DL (ref 6.3–8.2)
SODIUM SERPL-SCNC: 141 MMOL/L (ref 136–145)
TRIGL SERPL-MCNC: 130 MG/DL (ref 35–150)
VLDLC SERPL CALC-MCNC: 26 MG/DL (ref 6–23)

## 2019-08-29 PROCEDURE — 80053 COMPREHEN METABOLIC PANEL: CPT

## 2019-08-29 PROCEDURE — 36415 COLL VENOUS BLD VENIPUNCTURE: CPT

## 2019-08-29 PROCEDURE — 80061 LIPID PANEL: CPT

## 2020-01-13 ENCOUNTER — HOSPITAL ENCOUNTER (OUTPATIENT)
Dept: MAMMOGRAPHY | Age: 80
Discharge: HOME | End: 2020-01-13
Attending: INTERNAL MEDICINE
Payer: MEDICARE

## 2020-01-13 DIAGNOSIS — Z12.39 BREAST CANCER SCREENING: ICD-10-CM

## 2020-01-13 PROCEDURE — 77067 SCR MAMMO BI INCL CAD: CPT

## 2020-10-12 PROBLEM — Z96.652 STATUS POST TOTAL LEFT KNEE REPLACEMENT: Status: RESOLVED | Noted: 2018-04-23 | Resolved: 2020-10-12

## 2020-12-14 ENCOUNTER — TRANSCRIBE ORDER (OUTPATIENT)
Dept: SCHEDULING | Age: 80
End: 2020-12-14

## 2020-12-14 DIAGNOSIS — Z12.31 SCREENING MAMMOGRAM, ENCOUNTER FOR: Primary | ICD-10-CM

## 2021-01-14 ENCOUNTER — HOSPITAL ENCOUNTER (OUTPATIENT)
Dept: MAMMOGRAPHY | Age: 81
Discharge: HOME | End: 2021-01-14
Attending: INTERNAL MEDICINE
Payer: MEDICARE

## 2021-01-14 DIAGNOSIS — Z12.31 SCREENING MAMMOGRAM, ENCOUNTER FOR: ICD-10-CM

## 2021-01-14 PROCEDURE — 77067 SCR MAMMO BI INCL CAD: CPT

## 2021-03-03 ENCOUNTER — HOSPITAL ENCOUNTER (OUTPATIENT)
Dept: GENERAL RADIOLOGY | Age: 81
Discharge: HOME | End: 2021-03-03
Payer: MEDICARE

## 2021-03-03 DIAGNOSIS — J44.9 ASTHMA WITH COPD (HCC): ICD-10-CM

## 2021-03-03 PROCEDURE — 71046 X-RAY EXAM CHEST 2 VIEWS: CPT

## 2021-07-08 NOTE — PROGRESS NOTES
Ammy Yang  : 1940  Payor: Jayjay Villar / Plan: Neelimalonnie Asencio MEDICARE COMPLETE / Product Type: Managed Care Medicare /  [address]  at [facility]  [address], [address]  [phone]   [phone]      OUTPATIENT PHYSICAL THERAPY:Daily Note 2018    ICD-10: Treatment Diagnosis:   R26.2 Difficulty in walking, not elsewhere classified     M25.662 Stiffness of left knee, not elsewhere classified     M25.562 Pain in left knee   Precautions/Allergies:   Review of patient's allergies indicates no known allergies. Fall Risk Score: 3 (? 5 = High Risk)  MD Orders: Evaluate and treat MEDICAL/REFERRING DIAGNOSIS:  Presence of left artificial knee joint [Z96.652]   DATE OF ONSET: 2018  REFERRING PHYSICIAN: Rupa Rodriguez, *  RETURN PHYSICIAN APPOINTMENT: TBD     INITIAL ASSESSMENT:  Ms. Bebe Yao reported a L TKA on 2018 and has been receiving home physical therapy. She has been referred to outpatient physical therapy for continued rehabilitation. She ambualtes with a cane a L LE limp. PROBLEM LIST (Impacting functional limitations):  1. Decreased Strength  2. Decreased ADL/Functional Activities  3. Increased Pain  4. Decreased Activity Tolerance INTERVENTIONS PLANNED:  1. Home Exercise Program (HEP)  2. Manual Therapy  3. Neuromuscular Re-education/Strengthening  4. Range of Motion (ROM)  5. Therapeutic Exercise/Strengthening   TREATMENT PLAN:  Effective Dates: 2018 TO 2018. Frequency/Duration: 2 times a week for 90 days  GOALS: (Goals have been discussed and agreed upon with patient.)  SHORT-TERM FUNCTIONAL GOALS: Time Frame: 30 days  1. Increase LEFS 4 points to decrease pain 1-2 levels. 2. Increase L knee AROM 10 degrees to decrease pain 1-2 levels. 3. Increase L LE strength to allow initiation of stairs. DISCHARGE GOALS: Time Frame: 90 days  1.  Increase LEFS 9 points to decrease pain +2 levels. 2. Increase l LE strength to allow reciprocal stairs. 3. Demonstrate independence in home exercises to allow DC from physical therapy. Rehabilitation Potential For Stated Goals: Good  Regarding [de-identified] M Declanlorrielesley's therapy, I certify that the treatment plan above will be carried out by a therapist or under their direction. Thank you for this referral,                   The information in this section was collected on 5/23/2018 (except where otherwise noted). HISTORY:   History of Present Injury/Illness (Reason for Referral): The patient reported a L TKA on April 23, 2018 and has been receiving home physical therapy. She has been referred to outpatient physical therapy for continued rehabilitation. She ambualtes with a cane a L LE limp. Past Medical History/Comorbidities:   Ms. Jc Sosa  has a past medical history of Asthma with COPD ([facility] [address] CAD (coronary artery disease); Environmental and seasonal allergies; H/O echocardiogram (11/14/2016); History of nuclear stress test (12/04/2017); Graves' disease; Hyperlipidemia (11/16/2012); Hypertension; Menopause; Obesity (BMI 30-39.9); Osteoarthritis; Routine health maintenance (11/16/2012); S/P PTCA (percutaneous transluminal coronary angioplasty) (2012); Shortness of breath (05/10/2016); Status post total left knee replacement (4/23/2018); Status post total right knee replacement (2/1/2016); Thyroid disease; Tremor (11/16/2012); and Ulcerative colitis ([facility] [address] (11/7/2013). She also has no past medical history of Adverse effect of anesthesia; Difficult intubation; Malignant hyperthermia due to anesthesia; Nausea & vomiting; Pseudocholinesterase deficiency; or Unspecified adverse effect of anesthesia.   Ms. Jc Sosa  has a past surgical history that includes hx lap cholecystectomy (2002); hx hysterectomy (1983); hx heent (1981); hx tonsil and adenoidectomy (1959); hx heent; hx ptca (2012); hx knee replacement (Right, 02/01/2016); hx cataract removal (Left, 08/15/2016); and hx ankle fracture tx (Right, 1956). Social History/Living Environment:      Prior Level of Function/Work/Activity:  Retired  Dominant Side:         RIGHT  Current Medications:       Current Outpatient Prescriptions:     docusate sodium (COLACE) 100 mg capsule, Take 100 mg by mouth three (3) times daily as needed for Constipation. , Disp: , Rfl:     DULCOLAX, BISACODYL, PO, Take 100 mg by mouth as needed (constipation). , Disp: , Rfl:     HYDROmorphone (DILAUDID) 2 mg tablet, Take 1 Tab by mouth every four (4) hours as needed. Max Daily Amount: 12 mg., Disp: 40 Tab, Rfl: 0    acetaminophen (TYLENOL EXTRA STRENGTH) 500 mg tablet, Take  by mouth every six (6) hours as needed for Pain., Disp: , Rfl:     cholecalciferol, vitamin D3, (VITAMIN D3) 2,000 unit tab, Take  by mouth daily. , Disp: , Rfl:     losartan (COZAAR) 100 mg tablet, TAKE 1 TABLET BY MOUTH  DAILY FOR HYPERTENSION, Disp: 90 Tab, Rfl: 3    levothyroxine (SYNTHROID) 137 mcg tablet, Take 137 mcg by mouth Daily (before breakfast). , Disp: 90 Tab, Rfl: 3    metoprolol succinate (TOPROL-XL) 25 mg XL tablet, Take 1 Tab by mouth daily. , Disp: 90 Tab, Rfl: 3    simvastatin (ZOCOR) 40 mg tablet, Take 1 Tab by mouth nightly. (Patient taking differently: Take 40 mg by mouth daily.), Disp: 90 Tab, Rfl: 3    albuterol (PROVENTIL HFA, VENTOLIN HFA, PROAIR HFA) 90 mcg/actuation inhaler, Take 2 Puffs by inhalation every four (4) hours as needed for Wheezing., Disp: 3 Inhaler, Rfl: 3    fluticasone (FLONASE) 50 mcg/actuation nasal spray, 2 Sprays by Both Nostrils route daily. , Disp: 3 Bottle, Rfl: 3    multivitamin (DAILY MULTIPLE) tablet, Take 1 Tab by mouth daily.  Hold 7 days prior to surgery per anesthesia protocol, Disp: , Rfl:    Date Last Reviewed:  6/14/2018   Number of Personal Factors/Comorbidities that affect the Plan of Care: 1-2: MODERATE COMPLEXITY   EXAMINATION:     Observation/Orthostatic Postural Assessment:   Ambulation with a cane and L LE limp.  Palpation:   Tenderness around her L knee incision. ROM: AROM : KNEE    R        L   Extension          0        0   Flexion         +125   +100                    Strength:    +3/5 L LE Strength   +4/5 R LR Strength           Special Tests: N/A  Neurological Screen: N/A  Functional Mobility: Independent with a  cane and L LE limp. Balance:  Good. Body Structures Involved:  1. Joints  2. Muscles Body Functions Affected:  1. Sensory/Pain  2. Neuromusculoskeletal  3. Movement Related Activities and Participation Affected:  1. General Tasks and Demands  2. Mobility  3. Community, Social and [place]   Number of elements (examined above) that affect the Plan of Care: 3: MODERATE COMPLEXITY   CLINICAL PRESENTATION:   Presentation: Evolving clinical presentation with changing clinical characteristics: MODERATE COMPLEXITY   CLINICAL DECISION MAKING:   Outcome Measure: Tool Used: Lower Extremity Functional Scale (LEFS)  Score:  Initial: 45/80 Most Recent: X/80 (Date: -- )   Interpretation of Score: 20 questions each scored on a 5 point scale with 0 representing \"extreme difficulty or unable to perform\" and 4 representing \"no difficulty\". The lower the score, the greater the functional disability. 80/80 represents no disability. Minimal detectable change is 9 points. Score 80 79-63 62-48 47-32 31-16 15-1 0   Modifier CH CI CJ CK CL CM CN     ? Mobility - Walking and Moving Around:     - CURRENT STATUS: CK - 40%-59% impaired, limited or restricted    - GOAL STATUS: CJ - 20%-39% impaired, limited or restricted    - D/C STATUS:  ---------------To be determined---------------    Medical Necessity:   · Patient demonstrates good rehab potential due to higher previous functional level. Reason for Services/Other Comments:  · Patient continues to require skilled intervention due to her limited ability to ambulate with a normal gait pattern and perform ADLs in weight bearing. .   Use of outcome tool(s) and clinical judgement create a POC that gives a: Questionable prediction of patient's progress: MODERATE COMPLEXITY            TREATMENT:   (In addition to Assessment/Re-Assessment sessions the following treatments were rendered)  Pre-treatment Symptoms/Complaints: Patient reports knee overall is feeling some better just stiff more than pain. Pain: Initial:   Pain Intensity 1: 3  Post Session:  3   Therapeutic Exercise: ( 60): The patient received treatment as below to improve mobility, strength and balance. Required moderate verbal and manual cues to promote proper body alignment, promote proper body posture and promote proper body mechanics. Progressed resistance, range and repetitions as indicated. The patient received exercise instruction followed by patient demonstration of the exercises below along with hamstring and heel cord stretching to stabilize the patient's LE and improve AROM to decrease spasms, pain, and improve function.    Date:  6/4/2018 Date:  6/7/2018 Date  6-11-18 Date  6-14-18   Activity/Exercise       MAT  EXERCISES:       Hip ER / Abduction       Hip Adduction (Ball Squeeze)       Bridging       Straight Leg Raise       SAQ       Hip Abduction (Sidelying)       Gluteus Medius (Clam Shell)       Sit To Stand   2 x 10 2 x 10          STANDING HIP MACHINE:       Hip Abduction [email or web address] x 15(B) [email or web address] x 15(B) [email or web address] x 15(B) [email or web address] x 15(B)   Hip Flexion [email or web address] x 15(B) [email or web address] x 15(B) [email or web address] x 15(B) [email or web address] x 15(B)   Hip Extension [email or web address] x 15(B) [email or web address] x 15(B) [email or web address] x 15(B) [email or web address] x 15(B)   Hip Adduction       Standing Knee Extension [email or web address] x 15(B) [email or web address] x 15(B) [email or web address] x 15(B) [email or web address] x 15(B)   4 Way Cable Walkout       Gluteus Medius (Crab Walk)       Knee Extension [email or web address] x 15(B) [email or web address] x 15(B) [email or web address] x 15(B) [email or web address] x 15(B)   Knee Flexion [email or web address] x 15(B) [email or web address] x 15(B) [email or web address] x 15(B) [email or web address] x 15(B)   Step Downs       Step ups 6 inch    X 20   Stationary Bike 10 min 10 min 10 min    Treadmill 5 min      Stepper 10 min 15 min 15 min 10min FLEXIBILITY:       Heel Cord 3 min 3 min 3 min 3 min   Hamstring 3 min 3 min 3 min 3 min   Hip Flexors                [facility]  Evaluation: (  )  Manual Therapy (     ):   Therapeutic Modalities:                                                                                               HEP: As directed. Treatment/Session Assessment:    · Response to Treatment:  The patient tolerated today's treatment with mild discomfort. Patient demonstrated good effort with all exercises and was pleased that she was able to do step ups with little effort and no discomfort. Patient continues to be pleased with progress and eager to return to some light yard work. Instructed patient to continue home exercises as directed. · Compliance with Program/Exercises: Will assess as treatment progresses. · Recommendations/Intent for next treatment session: \"Next visit will focus on advancements to more challenging activities\".    Total Treatment Duration:  PT Patient Time In/Time Out  Time In: 0900  Time Out: [address], [place] Ketoconazole Counseling:   Patient counseled regarding improving absorption with orange juice.  Adverse effects include but are not limited to breast enlargement, headache, diarrhea, nausea, upset stomach, liver function test abnormalities, taste disturbance, and stomach pain.  There is a rare possibility of liver failure that can occur when taking ketoconazole. The patient understands that monitoring of LFTs may be required, especially at baseline. The patient verbalized understanding of the proper use and possible adverse effects of ketoconazole.  All of the patient's questions and concerns were addressed.

## 2021-12-21 ENCOUNTER — TRANSCRIBE ORDER (OUTPATIENT)
Dept: SCHEDULING | Age: 81
End: 2021-12-21

## 2021-12-21 DIAGNOSIS — Z12.31 VISIT FOR SCREENING MAMMOGRAM: Primary | ICD-10-CM

## 2022-01-21 ENCOUNTER — HOSPITAL ENCOUNTER (OUTPATIENT)
Dept: MAMMOGRAPHY | Age: 82
Discharge: HOME | End: 2022-01-21
Attending: INTERNAL MEDICINE
Payer: MEDICARE

## 2022-01-21 DIAGNOSIS — Z12.31 VISIT FOR SCREENING MAMMOGRAM: ICD-10-CM

## 2022-01-21 PROCEDURE — 77067 SCR MAMMO BI INCL CAD: CPT

## 2022-03-20 PROBLEM — I10 ESSENTIAL HYPERTENSION: Status: ACTIVE | Noted: 2018-10-22

## 2022-10-31 ENCOUNTER — OFFICE VISIT (OUTPATIENT)
Dept: CARDIOLOGY CLINIC | Age: 82
End: 2022-10-31
Payer: MEDICARE

## 2022-10-31 VITALS
DIASTOLIC BLOOD PRESSURE: 64 MMHG | HEIGHT: 66 IN | WEIGHT: 211 LBS | BODY MASS INDEX: 33.91 KG/M2 | HEART RATE: 66 BPM | SYSTOLIC BLOOD PRESSURE: 122 MMHG

## 2022-10-31 DIAGNOSIS — I10 ESSENTIAL HYPERTENSION: ICD-10-CM

## 2022-10-31 DIAGNOSIS — I25.118 ATHEROSCLEROSIS OF NATIVE CORONARY ARTERY OF NATIVE HEART WITH STABLE ANGINA PECTORIS (HCC): Primary | ICD-10-CM

## 2022-10-31 DIAGNOSIS — E78.5 DYSLIPIDEMIA: ICD-10-CM

## 2022-10-31 PROCEDURE — G8400 PT W/DXA NO RESULTS DOC: HCPCS | Performed by: INTERNAL MEDICINE

## 2022-10-31 PROCEDURE — G8417 CALC BMI ABV UP PARAM F/U: HCPCS | Performed by: INTERNAL MEDICINE

## 2022-10-31 PROCEDURE — 1090F PRES/ABSN URINE INCON ASSESS: CPT | Performed by: INTERNAL MEDICINE

## 2022-10-31 PROCEDURE — 99214 OFFICE O/P EST MOD 30 MIN: CPT | Performed by: INTERNAL MEDICINE

## 2022-10-31 PROCEDURE — 93000 ELECTROCARDIOGRAM COMPLETE: CPT | Performed by: INTERNAL MEDICINE

## 2022-10-31 PROCEDURE — 3074F SYST BP LT 130 MM HG: CPT | Performed by: INTERNAL MEDICINE

## 2022-10-31 PROCEDURE — 1036F TOBACCO NON-USER: CPT | Performed by: INTERNAL MEDICINE

## 2022-10-31 PROCEDURE — G8428 CUR MEDS NOT DOCUMENT: HCPCS | Performed by: INTERNAL MEDICINE

## 2022-10-31 PROCEDURE — 3078F DIAST BP <80 MM HG: CPT | Performed by: INTERNAL MEDICINE

## 2022-10-31 PROCEDURE — 1123F ACP DISCUSS/DSCN MKR DOCD: CPT | Performed by: INTERNAL MEDICINE

## 2022-10-31 PROCEDURE — G8484 FLU IMMUNIZE NO ADMIN: HCPCS | Performed by: INTERNAL MEDICINE

## 2022-10-31 ASSESSMENT — ENCOUNTER SYMPTOMS
ABDOMINAL PAIN: 0
COUGH: 0
BACK PAIN: 0
SHORTNESS OF BREATH: 0

## 2022-10-31 NOTE — PROGRESS NOTES
[address]      10/31/22      NAME:  Jaycob Hines  : 1940  MRN: [id number]      SUBJECTIVE:   Jaycob Hines is a 79 y.o. female seen for a follow up visit regarding the following:     Chief Complaint   Patient presents with    Hypertension    Coronary Artery Disease       HPI:    Patient with obesity, hypertension, dyslipidemia and a strong family history of cardiomyopathy but no CAD. She has established CAD with PCI in . She has history of asthma and frequent bronchitis. Her cholesterol and blood pressure are well controlled. Her weight remains unchanged. Echo 10/2021 with normal LVEF and no valve disease. Past Medical History, Past Surgical History, Family history, Social History, and Medications were all reviewed with the patient today and updated as necessary. Current Outpatient Medications   Medication Sig Dispense Refill    albuterol sulfate  (90 Base) MCG/ACT inhaler Inhale into the lungs      amLODIPine (NORVASC) 5 MG tablet Take 5 mg by mouth daily      aspirin 81 MG chewable tablet Take 81 mg by mouth daily      betamethasone valerate (VALISONE) 0.1 % lotion Apply topically 2 times daily      budesonide-formoterol (SYMBICORT) 80-4.5 MCG/ACT AERO Inhale 2 puffs into the lungs 2 times daily      Cholecalciferol 50 MCG (2000 UT) TABS Take by mouth daily      fluticasone (FLONASE) 50 MCG/ACT nasal spray 2 sprays by Nasal route daily      hydroCHLOROthiazide (HYDRODIURIL) 12.5 MG tablet Take 12.5 mg by mouth daily      levothyroxine (SYNTHROID) 137 MCG tablet TAKE 1 TABLET BY MOUTH DAILY      losartan (COZAAR) 100 MG tablet Take 100 mg by mouth daily      metoprolol succinate (TOPROL XL) 25 MG extended release tablet TAKE 1 TABLET BY MOUTH DAILY      rosuvastatin (CRESTOR) 10 MG tablet Take 10 mg by mouth       No current facility-administered medications for this visit.      Patient Active Problem List    Diagnosis Date Noted    Obesity (BMI 30-39.9) 05/15/2012     Priority: High    Coronary atherosclerosis of native coronary artery 05/15/2012     Priority: High     2012:  3.0x18 Xience mLAD, POBA D1   2017:  Normal stress MPI        Dyslipidemia 05/15/2012     Priority: High    Essential hypertension 10/22/2018     Priority: Low    Hypothyroid 2013     Priority: Low    History of tobacco use 2013     Priority: Low    PPD positive 2013     Priority: Low    Allergic rhinitis 2013     Priority: Low    Asthma with COPD ([facility] [address] 2013     Priority: Low    Tremor 2012     Priority: Low      Family History   Problem Relation Age of Onset    Dementia Sister     Diabetes Sister     Hypertension Sister     Osteoarthritis Father     Heart Disease Mother     Hypertension Brother     Hypertension Brother     Cancer Brother     Hypertension Brother     Hypertension Brother     Breast Cancer Neg Hx      Social History     Tobacco Use    Smoking status: Former     Packs/day: 2.00     Types: Cigarettes     Quit date: 1970     Years since quittin.3    Smokeless tobacco: Never    Tobacco comments:     Quit smoking: social smoker 1-2 qd   Substance Use Topics    Alcohol use: Not Currently       Review Of Symptoms    Review of Systems   Constitutional: Negative for fever and malaise/fatigue. HENT:  Negative for nosebleeds. Cardiovascular:  Negative for chest pain, dyspnea on exertion and palpitations. Respiratory:  Negative for cough and shortness of breath. Musculoskeletal:  Negative for back pain, muscle cramps, muscle weakness and myalgias. Gastrointestinal:  Negative for abdominal pain. Neurological:  Negative for dizziness. Physical Exam  Blood pressure 122/64, pulse 66, height 5' 6\" (1.676 m), weight 211 lb (95.7 kg). Physical Exam  HENT:      Head: Normocephalic and atraumatic. Eyes:      Extraocular Movements: Extraocular movements intact. Cardiovascular:      Rate and Rhythm: Normal rate and regular rhythm. Heart sounds: No murmur heard. Pulmonary:      Effort: Pulmonary effort is normal.      Breath sounds: Normal breath sounds. Abdominal:      Palpations: Abdomen is soft. Musculoskeletal:         General: Normal range of motion. Skin:     General: Skin is warm and dry. Neurological:      General: No focal deficit present. Mental Status: She is oriented to person, place, and time. Medical problems, medical history and test results were reviewed with the patient today.       Lab Results   Component Value Date    CHOL 147 01/18/2021    CHOL 158 05/26/2020    CHOL 133 08/29/2019     Lab Results   Component Value Date    TRIG 253 (H) 01/18/2021    TRIG 173 (H) 05/26/2020    TRIG 130 08/29/2019     Lab Results   Component Value Date    HDL 46 01/18/2021    HDL 47 05/26/2020    HDL 50 08/29/2019     Lab Results   Component Value Date    LDLCALC 61 01/18/2021    LDLCALC 76 05/26/2020    LDLCALC 57 08/29/2019     Lab Results   Component Value Date    LABVLDL 35 05/26/2020    LABVLDL 26 (H) 08/29/2019    VLDL 40 01/18/2021     Lab Results   Component Value Date    CHOLHDLRATIO 2.7 08/29/2019        No results found for: LABA1C  No results found for: EAG     Lab Results   Component Value Date     01/18/2021    K 4.3 01/18/2021     01/18/2021    CO2 24 01/18/2021    BUN 11 01/18/2021    CREATININE 0.75 01/18/2021    GLUCOSE 110 (H) 01/18/2021    CALCIUM 9.2 01/18/2021    PROT 6.1 01/18/2021    LABALBU 4.1 01/18/2021    BILITOT 0.4 01/18/2021    ALKPHOS 82 01/18/2021    AST 20 01/18/2021    ALT 24 01/18/2021    GFRAA 86 01/18/2021    AGRATIO 1.0 (L) 08/29/2019    GLOB 3.3 08/29/2019       Lab Results   Component Value Date/Time     01/18/2021 10:06 AM    K 4.3 01/18/2021 10:06 AM     01/18/2021 10:06 AM    CO2 24 01/18/2021 10:06 AM    BUN 11 01/18/2021 10:06 AM    CREATININE 0.75 01/18/2021 10:06 AM GLUCOSE 110 01/18/2021 10:06 AM    CALCIUM 9.2 01/18/2021 10:06 AM        Lab Results   Component Value Date    WBC 7.1 01/18/2021    HGB 14.9 01/18/2021    HCT 43.5 01/18/2021    MCV 91 01/18/2021     01/18/2021             ASSESSMENT:    Harsh Kuhn was seen today for follow-up. Diagnoses and all orders for this visit:    Atherosclerosis of native coronary artery of native heart without angina pectoris - No angina and active. Stress MPI was (-) in 2017. Echocardiogram is normal 10/2022. Stable on aspirin 81 mg daily, amlodipine 5 mg daily, metoprolol succinate 25 mg daily, losartan 100 mg daily, HCTZ 12.5 mg daily, and rosuvastatin 10 mg daily. Dyslipidemia - Excellent control on rosuvastatin 10 mg daily. LDL 61 02/2022. Hypertensive heart disease without heart failure - BP had been elevated and now improved with medical compliance. Stable on HCTZ, metoprolol succinate, losartan, amlodipine. Mild swelling and encourage keep feeling elevated and wear support stockings. Problem List Items Addressed This Visit          Circulatory    Coronary atherosclerosis of native coronary artery - Primary    Relevant Orders    Lipid Panel    Comprehensive Metabolic Panel    CBC    Essential hypertension    Relevant Orders    EKG 12 Lead (Completed)       Other    Dyslipidemia    Relevant Orders    Lipid Panel    Comprehensive Metabolic Panel    CBC       There are no discontinued medications. Patient has been instructed and agrees to call our office with any issues or other concerns related to their cardiac condition(s) and/or complaint(s). Return in about 6 months (around 4/30/2023).        Elliot Pearce MD  10/31/2022

## 2023-02-01 ENCOUNTER — HOSPITAL ENCOUNTER (OUTPATIENT)
Dept: MAMMOGRAPHY | Age: 83
Discharge: HOME | End: 2023-02-04
Payer: MEDICARE

## 2023-02-01 DIAGNOSIS — Z78.0 POST-MENOPAUSAL: ICD-10-CM

## 2023-02-01 DIAGNOSIS — Z12.31 BREAST CANCER SCREENING BY MAMMOGRAM: ICD-10-CM

## 2023-02-01 PROCEDURE — 77080 DXA BONE DENSITY AXIAL: CPT

## 2023-02-01 PROCEDURE — 77067 SCR MAMMO BI INCL CAD: CPT

## 2023-03-21 RX ORDER — DILTIAZEM HYDROCHLORIDE 60 MG/1
TABLET, FILM COATED ORAL
Qty: 30.6 G | Refills: 3 | Status: SHIPPED | OUTPATIENT
Start: 2023-03-21

## 2023-03-22 RX ORDER — DILTIAZEM HYDROCHLORIDE 60 MG/1
TABLET, FILM COATED ORAL
Qty: 30.6 G | Refills: 3 | OUTPATIENT
Start: 2023-03-22

## 2023-05-08 ENCOUNTER — OFFICE VISIT (OUTPATIENT)
Age: 83
End: 2023-05-08
Payer: MEDICARE

## 2023-05-08 VITALS
HEIGHT: 66 IN | WEIGHT: 211 LBS | BODY MASS INDEX: 33.91 KG/M2 | SYSTOLIC BLOOD PRESSURE: 114 MMHG | HEART RATE: 76 BPM | DIASTOLIC BLOOD PRESSURE: 76 MMHG

## 2023-05-08 DIAGNOSIS — I10 ESSENTIAL HYPERTENSION: ICD-10-CM

## 2023-05-08 DIAGNOSIS — E78.5 DYSLIPIDEMIA: ICD-10-CM

## 2023-05-08 DIAGNOSIS — I25.10 ATHEROSCLEROSIS OF NATIVE CORONARY ARTERY OF NATIVE HEART WITHOUT ANGINA PECTORIS: Primary | ICD-10-CM

## 2023-05-08 PROCEDURE — 1123F ACP DISCUSS/DSCN MKR DOCD: CPT | Performed by: INTERNAL MEDICINE

## 2023-05-08 PROCEDURE — 3074F SYST BP LT 130 MM HG: CPT | Performed by: INTERNAL MEDICINE

## 2023-05-08 PROCEDURE — G8399 PT W/DXA RESULTS DOCUMENT: HCPCS | Performed by: INTERNAL MEDICINE

## 2023-05-08 PROCEDURE — 99214 OFFICE O/P EST MOD 30 MIN: CPT | Performed by: INTERNAL MEDICINE

## 2023-05-08 PROCEDURE — 3078F DIAST BP <80 MM HG: CPT | Performed by: INTERNAL MEDICINE

## 2023-05-08 PROCEDURE — 1090F PRES/ABSN URINE INCON ASSESS: CPT | Performed by: INTERNAL MEDICINE

## 2023-05-08 PROCEDURE — 1036F TOBACCO NON-USER: CPT | Performed by: INTERNAL MEDICINE

## 2023-05-08 PROCEDURE — G8428 CUR MEDS NOT DOCUMENT: HCPCS | Performed by: INTERNAL MEDICINE

## 2023-05-08 PROCEDURE — G8417 CALC BMI ABV UP PARAM F/U: HCPCS | Performed by: INTERNAL MEDICINE

## 2023-05-08 RX ORDER — FUROSEMIDE 20 MG/1
20 TABLET ORAL DAILY PRN
COMMUNITY

## 2023-05-08 ASSESSMENT — ENCOUNTER SYMPTOMS
SHORTNESS OF BREATH: 0
BACK PAIN: 0
ABDOMINAL PAIN: 0
COUGH: 0

## 2023-05-08 NOTE — PROGRESS NOTES
GLUCOSE 110 01/18/2021 10:06 AM    CALCIUM 9.2 01/18/2021 10:06 AM        Lab Results   Component Value Date    WBC 7.1 01/18/2021    HGB 14.9 01/18/2021    HCT 43.5 01/18/2021    MCV 91 01/18/2021     01/18/2021             ASSESSMENT:    Wily Moss was seen today for follow-up. Diagnoses and all orders for this visit:    Atherosclerosis of native coronary artery of native heart without angina pectoris - No angina and active. Stress MPI was (-) in 2017. Echocardiogram is normal 10/2022. Stable on aspirin 81 mg daily, amlodipine 5 mg daily, metoprolol succinate 25 mg daily, losartan 100 mg daily, HCTZ 12.5 mg daily, and rosuvastatin 10 mg daily. Dyslipidemia - Excellent control on rosuvastatin 10 mg daily. LDL 62 02/2023. Hypertensive heart disease without heart failure - BP improved with medical compliance. Stable on HCTZ 12.5mg daily, metoprolol succinate 25mg daily, losartan 100mg daily, amlodipine 5mg daily. Mild swelling and encourage keep feeling elevated and wear support stockings. Problem List Items Addressed This Visit          Circulatory    Coronary atherosclerosis of native coronary artery - Primary    Relevant Medications    furosemide (LASIX) 20 MG tablet    Other Relevant Orders    Transthoracic echocardiogram (TTE) complete with contrast, bubble, strain, and 3D PRN    Essential hypertension       Other    Dyslipidemia     Medications Discontinued During This Encounter   Medication Reason    Cholecalciferol 50 MCG (2000 UT) TABS LIST CLEANUP             Patient has been instructed and agrees to call our office with any issues or other concerns related to their cardiac condition(s) and/or complaint(s). Return in about 6 months (around 11/8/2023).        Javier Mena MD  5/8/2023

## 2023-09-08 ENCOUNTER — TELEPHONE (OUTPATIENT)
Dept: PULMONOLOGY | Age: 83
End: 2023-09-08

## 2023-09-29 ENCOUNTER — OFFICE VISIT (OUTPATIENT)
Dept: PULMONOLOGY | Age: 83
End: 2023-09-29
Payer: MEDICARE

## 2023-09-29 VITALS
BODY MASS INDEX: 33.91 KG/M2 | WEIGHT: 211 LBS | TEMPERATURE: 98 F | DIASTOLIC BLOOD PRESSURE: 80 MMHG | RESPIRATION RATE: 20 BRPM | SYSTOLIC BLOOD PRESSURE: 140 MMHG | HEIGHT: 66 IN | HEART RATE: 76 BPM | OXYGEN SATURATION: 94 %

## 2023-09-29 DIAGNOSIS — J44.89 ASTHMA WITH COPD: Primary | ICD-10-CM

## 2023-09-29 DIAGNOSIS — R76.11 PPD POSITIVE: ICD-10-CM

## 2023-09-29 DIAGNOSIS — E66.9 OBESITY (BMI 30-39.9): ICD-10-CM

## 2023-09-29 PROCEDURE — 3077F SYST BP >= 140 MM HG: CPT | Performed by: INTERNAL MEDICINE

## 2023-09-29 PROCEDURE — 99214 OFFICE O/P EST MOD 30 MIN: CPT | Performed by: INTERNAL MEDICINE

## 2023-09-29 PROCEDURE — 3079F DIAST BP 80-89 MM HG: CPT | Performed by: INTERNAL MEDICINE

## 2023-09-29 PROCEDURE — 1123F ACP DISCUSS/DSCN MKR DOCD: CPT | Performed by: INTERNAL MEDICINE

## 2023-09-29 RX ORDER — FLUTICASONE PROPIONATE 50 MCG
2 SPRAY, SUSPENSION (ML) NASAL DAILY
Qty: 1 EACH | Refills: 11 | Status: SHIPPED | OUTPATIENT
Start: 2023-09-29

## 2023-09-29 RX ORDER — BUDESONIDE AND FORMOTEROL FUMARATE DIHYDRATE 80; 4.5 UG/1; UG/1
2 AEROSOL RESPIRATORY (INHALATION) 2 TIMES DAILY
Qty: 1 EACH | Refills: 11 | Status: SHIPPED | OUTPATIENT
Start: 2023-09-29

## 2023-09-29 RX ORDER — ALBUTEROL SULFATE 90 UG/1
2 AEROSOL, METERED RESPIRATORY (INHALATION) EVERY 6 HOURS PRN
Qty: 1 EACH | Refills: 11 | Status: SHIPPED | OUTPATIENT
Start: 2023-09-29

## 2023-09-29 NOTE — PROGRESS NOTES
Instructions    albuterol sulfate HFA (PROVENTIL;VENTOLIN;PROAIR) 108 (90 Base) MCG/ACT inhaler 2 puffs, Inhalation, EVERY 6 HOURS PRN    amLODIPine (NORVASC) 5 mg, Oral, DAILY    aspirin 81 mg, Oral, DAILY    betamethasone valerate (VALISONE) 0.1 % lotion Topical, 2 TIMES DAILY    budesonide-formoterol (SYMBICORT) 80-4.5 MCG/ACT AERO 2 puffs, Inhalation, 2 times daily    fluticasone (FLONASE) 50 MCG/ACT nasal spray 2 sprays, Nasal, DAILY    furosemide (LASIX) 20 mg, Oral, DAILY PRN    hydroCHLOROthiazide (HYDRODIURIL) 12.5 mg, Oral, DAILY    levothyroxine (SYNTHROID) 137 MCG tablet TAKE 1 TABLET BY MOUTH DAILY    losartan (COZAAR) 100 mg, Oral, DAILY    metoprolol succinate (TOPROL XL) 25 MG extended release tablet TAKE 1 TABLET BY MOUTH DAILY    rosuvastatin (CRESTOR) 10 mg, Oral

## 2023-11-02 ENCOUNTER — TELEPHONE (OUTPATIENT)
Age: 83
End: 2023-11-02

## 2023-11-02 NOTE — TELEPHONE ENCOUNTER
Pt was calling to get appointment rescheduled but only appointment is next year. Pt lost her brother this week and will be out of town. Pt would appreciate a call back to reschedule echo and Dr. Mercado.

## 2024-02-08 ENCOUNTER — HOSPITAL ENCOUNTER (OUTPATIENT)
Dept: MAMMOGRAPHY | Age: 84
Discharge: HOME | End: 2024-02-08
Payer: MEDICARE

## 2024-02-08 DIAGNOSIS — Z12.31 VISIT FOR SCREENING MAMMOGRAM: ICD-10-CM

## 2024-02-08 PROCEDURE — 77067 SCR MAMMO BI INCL CAD: CPT

## 2024-02-12 RX ORDER — DILTIAZEM HYDROCHLORIDE 60 MG/1
TABLET, FILM COATED ORAL
Qty: 30.6 G | Refills: 3 | Status: SHIPPED | OUTPATIENT
Start: 2024-02-12

## 2024-02-12 NOTE — TELEPHONE ENCOUNTER
Patients pharmacy requesting a new prescription for 3 month supply at a time with 3 refills for patients  SYMBICORT 80-4.5 MCG/ACT AERO. Last seen on 9/29/2023 and to return in a year. Please check prescription before you send it. Thanks MARITO

## 2024-02-19 ENCOUNTER — TELEPHONE (OUTPATIENT)
Dept: PRIMARY CARE CLINIC | Facility: CLINIC | Age: 84
End: 2024-02-19

## 2024-02-19 ENCOUNTER — OFFICE VISIT (OUTPATIENT)
Age: 84
End: 2024-02-19
Payer: MEDICARE

## 2024-02-19 VITALS
BODY MASS INDEX: 33.97 KG/M2 | WEIGHT: 211.4 LBS | SYSTOLIC BLOOD PRESSURE: 124 MMHG | HEART RATE: 70 BPM | HEIGHT: 66 IN | DIASTOLIC BLOOD PRESSURE: 70 MMHG

## 2024-02-19 DIAGNOSIS — E66.9 OBESITY (BMI 30-39.9): ICD-10-CM

## 2024-02-19 DIAGNOSIS — E78.5 DYSLIPIDEMIA: ICD-10-CM

## 2024-02-19 DIAGNOSIS — I10 ESSENTIAL HYPERTENSION: Primary | ICD-10-CM

## 2024-02-19 DIAGNOSIS — I25.10 ATHEROSCLEROSIS OF NATIVE CORONARY ARTERY OF NATIVE HEART WITHOUT ANGINA PECTORIS: ICD-10-CM

## 2024-02-19 PROCEDURE — 93000 ELECTROCARDIOGRAM COMPLETE: CPT | Performed by: INTERNAL MEDICINE

## 2024-02-19 PROCEDURE — 3078F DIAST BP <80 MM HG: CPT | Performed by: INTERNAL MEDICINE

## 2024-02-19 PROCEDURE — 1123F ACP DISCUSS/DSCN MKR DOCD: CPT | Performed by: INTERNAL MEDICINE

## 2024-02-19 PROCEDURE — 99214 OFFICE O/P EST MOD 30 MIN: CPT | Performed by: INTERNAL MEDICINE

## 2024-02-19 PROCEDURE — 3074F SYST BP LT 130 MM HG: CPT | Performed by: INTERNAL MEDICINE

## 2024-02-19 ASSESSMENT — ENCOUNTER SYMPTOMS
ABDOMINAL PAIN: 0
COUGH: 0
SHORTNESS OF BREATH: 0
BACK PAIN: 0

## 2024-02-19 NOTE — TELEPHONE ENCOUNTER
----- Message from Carissa Murray sent at 2/19/2024  3:51 PM EST -----  Subject: Appointment Request    Reason for Call: New Patient/New to Provider Appointment needed: New   Patient Request Appointment    QUESTIONS    Reason for appointment request? No appointments available during search     Additional Information for Provider? patient  Willi called to   schedule new patient appt to [facility] care with Dr. Sanz if possible - they   are hoping for an appt in April if at all possible, please call them with   first available appts  ---------------------------------------------------------------------------  --------------  CALL BACK INFO  [phone]; OK to leave message on voicemail  ---------------------------------------------------------------------------  --------------  SCRIPT ANSWERS

## 2024-02-19 NOTE — PROGRESS NOTES
10:06 AM    CO2 24 01/18/2021 10:06 AM    BUN 11 01/18/2021 10:06 AM    CREATININE 0.75 01/18/2021 10:06 AM    GLUCOSE 110 01/18/2021 10:06 AM    CALCIUM 9.2 01/18/2021 10:06 AM        Lab Results   Component Value Date    WBC 7.1 01/18/2021    HGB 14.9 01/18/2021    HCT 43.5 01/18/2021    MCV 91 01/18/2021     01/18/2021             ASSESSMENT:    Florinda was seen today for follow-up.    Diagnoses and all orders for this visit:    Atherosclerosis of native coronary artery of native heart without angina pectoris - No angina and active. Stress MPI was (-) in 2017.  Echocardiogram is normal 10/2022.  Stable on aspirin 81 mg daily, amlodipine 5 mg daily, metoprolol succinate 25 mg daily, losartan 100 mg daily, HCTZ 12.5 mg daily, and rosuvastatin 10 mg daily.  Normal echo 12/2024.      Dyslipidemia - Excellent control on rosuvastatin 10 mg daily.  LDL 59 10/2023.      Hypertensive heart disease without heart failure - BP improved with medical compliance.  Stable on HCTZ 12.5mg daily, metoprolol succinate 25mg daily, losartan 100mg daily, amlodipine 5mg daily.  Mild swelling and encourage keep feeling elevated and wear support stockings.        Problem List Items Addressed This Visit          Circulatory    Coronary atherosclerosis of native coronary artery    Essential hypertension - Primary    Relevant Orders    EKG 12 Lead (Completed)       Other    Obesity (BMI 30-39.9)    Dyslipidemia     There are no discontinued medications.        Patient has been instructed and agrees to call our office with any issues or other concerns related to their cardiac condition(s) and/or complaint(s).      Return in about 6 months (around 8/19/2024).       ANASTACIO MERRITT MD  2/19/2024

## 2024-02-24 ENCOUNTER — HOSPITAL ENCOUNTER (EMERGENCY)
Age: 84
Discharge: HOME | End: 2024-02-24
Attending: EMERGENCY MEDICINE
Payer: MEDICARE

## 2024-02-24 ENCOUNTER — APPOINTMENT (OUTPATIENT)
Dept: CT IMAGING | Age: 84
End: 2024-02-24
Payer: MEDICARE

## 2024-02-24 VITALS
OXYGEN SATURATION: 93 % | HEIGHT: 66 IN | BODY MASS INDEX: 34.07 KG/M2 | SYSTOLIC BLOOD PRESSURE: 149 MMHG | HEART RATE: 88 BPM | DIASTOLIC BLOOD PRESSURE: 67 MMHG | RESPIRATION RATE: 18 BRPM | WEIGHT: 212 LBS | TEMPERATURE: 98 F

## 2024-02-24 DIAGNOSIS — R10.9 ACUTE ABDOMINAL PAIN: Primary | ICD-10-CM

## 2024-02-24 DIAGNOSIS — R19.7 NAUSEA VOMITING AND DIARRHEA: ICD-10-CM

## 2024-02-24 DIAGNOSIS — R11.2 NAUSEA VOMITING AND DIARRHEA: ICD-10-CM

## 2024-02-24 LAB
ALBUMIN SERPL-MCNC: 3.5 G/DL (ref 3.2–4.6)
ALBUMIN/GLOB SERPL: 0.9 (ref 0.4–1.6)
ALP SERPL-CCNC: 76 U/L (ref 50–130)
ALT SERPL-CCNC: 37 U/L (ref 12–65)
ANION GAP SERPL CALC-SCNC: 6 MMOL/L (ref 2–11)
AST SERPL-CCNC: 29 U/L (ref 15–37)
BASOPHILS # BLD: 0 K/UL (ref 0–0.2)
BASOPHILS NFR BLD: 0 % (ref 0–2)
BILIRUB SERPL-MCNC: 0.7 MG/DL (ref 0.2–1.1)
BUN SERPL-MCNC: 23 MG/DL (ref 8–23)
CALCIUM SERPL-MCNC: 9 MG/DL (ref 8.3–10.4)
CHLORIDE SERPL-SCNC: 107 MMOL/L (ref 103–113)
CO2 SERPL-SCNC: 23 MMOL/L (ref 21–32)
CREAT SERPL-MCNC: 0.92 MG/DL (ref 0.6–1)
DIFFERENTIAL METHOD BLD: ABNORMAL
EOSINOPHIL # BLD: 0 K/UL (ref 0–0.8)
EOSINOPHIL NFR BLD: 0 % (ref 0.5–7.8)
ERYTHROCYTE [DISTWIDTH] IN BLOOD BY AUTOMATED COUNT: 12.6 % (ref 11.9–14.6)
GLOBULIN SER CALC-MCNC: 3.9 G/DL (ref 2.8–4.5)
GLUCOSE SERPL-MCNC: 145 MG/DL (ref 65–100)
HCT VFR BLD AUTO: 45.7 % (ref 35.8–46.3)
HGB BLD-MCNC: 15.6 G/DL (ref 11.7–15.4)
IMM GRANULOCYTES # BLD AUTO: 0.1 K/UL (ref 0–0.5)
IMM GRANULOCYTES NFR BLD AUTO: 0 % (ref 0–5)
LIPASE SERPL-CCNC: 92 U/L (ref 73–393)
LYMPHOCYTES # BLD: 0.3 K/UL (ref 0.5–4.6)
LYMPHOCYTES NFR BLD: 2 % (ref 13–44)
MCH RBC QN AUTO: 29.7 PG (ref 26.1–32.9)
MCHC RBC AUTO-ENTMCNC: 34.1 G/DL (ref 31.4–35)
MCV RBC AUTO: 86.9 FL (ref 82–102)
MONOCYTES # BLD: 0.3 K/UL (ref 0.1–1.3)
MONOCYTES NFR BLD: 2 % (ref 4–12)
NEUTS SEG # BLD: 13.4 K/UL (ref 1.7–8.2)
NEUTS SEG NFR BLD: 95 % (ref 43–78)
NRBC # BLD: 0 K/UL (ref 0–0.2)
PLATELET # BLD AUTO: 267 K/UL (ref 150–450)
PMV BLD AUTO: 10.1 FL (ref 9.4–12.3)
POTASSIUM SERPL-SCNC: 4.1 MMOL/L (ref 3.5–5.1)
PROT SERPL-MCNC: 7.4 G/DL (ref 6.3–8.2)
RBC # BLD AUTO: 5.26 M/UL (ref 4.05–5.2)
SODIUM SERPL-SCNC: 136 MMOL/L (ref 136–146)
WBC # BLD AUTO: 14.1 K/UL (ref 4.3–11.1)

## 2024-02-24 PROCEDURE — 2580000003 HC RX 258: Performed by: EMERGENCY MEDICINE

## 2024-02-24 PROCEDURE — 96374 THER/PROPH/DIAG INJ IV PUSH: CPT

## 2024-02-24 PROCEDURE — 99285 EMERGENCY DEPT VISIT HI MDM: CPT

## 2024-02-24 PROCEDURE — 74177 CT ABD & PELVIS W/CONTRAST: CPT

## 2024-02-24 PROCEDURE — 85025 COMPLETE CBC W/AUTO DIFF WBC: CPT

## 2024-02-24 PROCEDURE — 83690 ASSAY OF LIPASE: CPT

## 2024-02-24 PROCEDURE — 6370000000 HC RX 637 (ALT 250 FOR IP): Performed by: EMERGENCY MEDICINE

## 2024-02-24 PROCEDURE — 6360000002 HC RX W HCPCS: Performed by: EMERGENCY MEDICINE

## 2024-02-24 PROCEDURE — 6360000004 HC RX CONTRAST MEDICATION: Performed by: EMERGENCY MEDICINE

## 2024-02-24 PROCEDURE — 80053 COMPREHEN METABOLIC PANEL: CPT

## 2024-02-24 RX ORDER — ONDANSETRON 4 MG/1
4 TABLET, ORALLY DISINTEGRATING ORAL 3 TIMES DAILY PRN
Qty: 9 TABLET | Refills: 0 | Status: SHIPPED | OUTPATIENT
Start: 2024-02-24

## 2024-02-24 RX ORDER — 0.9 % SODIUM CHLORIDE 0.9 %
500 INTRAVENOUS SOLUTION INTRAVENOUS ONCE
Status: COMPLETED | OUTPATIENT
Start: 2024-02-24 | End: 2024-02-24

## 2024-02-24 RX ORDER — DIPHENOXYLATE HYDROCHLORIDE AND ATROPINE SULFATE 2.5; .025 MG/1; MG/1
1 TABLET ORAL
Status: COMPLETED | OUTPATIENT
Start: 2024-02-24 | End: 2024-02-24

## 2024-02-24 RX ORDER — ONDANSETRON 4 MG/1
4 TABLET, ORALLY DISINTEGRATING ORAL 3 TIMES DAILY PRN
Qty: 9 TABLET | Refills: 0 | OUTPATIENT
Start: 2024-02-24 | End: 2024-02-24

## 2024-02-24 RX ORDER — ONDANSETRON 2 MG/ML
4 INJECTION INTRAMUSCULAR; INTRAVENOUS
Status: COMPLETED | OUTPATIENT
Start: 2024-02-24 | End: 2024-02-24

## 2024-02-24 RX ADMIN — ONDANSETRON 4 MG: 2 INJECTION INTRAMUSCULAR; INTRAVENOUS at 16:04

## 2024-02-24 RX ADMIN — DIPHENOXYLATE HYDROCHLORIDE AND ATROPINE SULFATE 1 TABLET: 2.5; .025 TABLET ORAL at 18:30

## 2024-02-24 RX ADMIN — HYOSCYAMINE SULFATE 250 MCG: 0.12 TABLET ORAL; SUBLINGUAL at 16:04

## 2024-02-24 RX ADMIN — SODIUM CHLORIDE 500 ML: 9 INJECTION, SOLUTION INTRAVENOUS at 16:08

## 2024-02-24 RX ADMIN — IOPAMIDOL 100 ML: 755 INJECTION, SOLUTION INTRAVENOUS at 17:27

## 2024-02-24 ASSESSMENT — PAIN DESCRIPTION - LOCATION: LOCATION: ABDOMEN

## 2024-02-24 ASSESSMENT — ENCOUNTER SYMPTOMS
ABDOMINAL PAIN: 1
BACK PAIN: 1
COUGH: 0
VOMITING: 1
SHORTNESS OF BREATH: 0
DIARRHEA: 1
NAUSEA: 1

## 2024-02-24 ASSESSMENT — PAIN - FUNCTIONAL ASSESSMENT: PAIN_FUNCTIONAL_ASSESSMENT: 0-10

## 2024-02-24 ASSESSMENT — PAIN SCALES - GENERAL: PAINLEVEL_OUTOF10: 8

## 2024-02-24 NOTE — ED PROVIDER NOTES
Emergency Department Provider Note       PCP: Norah Dhillon MD   Age: 84 y.o.   Sex: female     DISPOSITION Decision To Discharge 02/24/2024 06:19:03 PM       ICD-10-CM    1. Acute abdominal pain  R10.9       2. Nausea vomiting and diarrhea  R11.2     R19.7           Medical Decision Making     Nausea vomiting diarrhea associated with diffuse abdominal pain.  Possibility of food related illness, viral infection.  Less likely would be bowel obstruction.  No history of diverticulitis or pancreatitis.  Screening lab work.  IV fluids.  Nausea and pain control.  Any imaging depending upon reexam and lab work results     1 or more acute illnesses that pose a threat to life or bodily function.   Prescription drug management performed.  Chronic medical problems impacting care include diabetes.    I independently ordered and reviewed each unique test.  I reviewed external records: provider visit note from outside specialist.  Cardiology note regarding some coronary artery disease.  Primary care doctor notes regarding hypertension.  Office note from pulmonology regarding asthma and COPD.  I interpreted the CT Scan no evidence for intra-abdominal bleeding, tumor, obstruction or infection.      History     84-year-old female arrives by private vehicle.  Patient started up 12 hours ago watery diarrhea about 4 this morning.  She then proceeded to have nausea with 6 episodes of vomiting.  Noted discomfort achiness throughout her abdomen radiating to the back.  No abnormal bleeding.  She does have some urinary frequency.  No abnormal bleeding.  Has a history of hip and hysterectomy and cholecystectomy.   had some similar issues with diarrhea few days ago.  History hypertension.  Has coronary artery disease with stenting in the past also some COPD.  No unusual food or travel.  No recent antibiotics    The history is provided by the patient.       ROS     Review of Systems   Constitutional:  Positive for chills and

## 2024-02-24 NOTE — ED TRIAGE NOTES
Per patient n/v/d starting this am at 0400. States generalized abdominal pain. Denies gu complications. Denies fever/chills.

## 2024-02-24 NOTE — ED NOTES
I have reviewed discharge instructions with the patient.  The patient verbalized understanding.    Patient left ED via Discharge Method: ambulatory to Home with family.    Opportunity for questions and clarification provided.       Patient given 1 scripts.         To continue your aftercare when you leave the hospital, you may receive an automated call from our care team to check in on how you are doing.  This is a free service and part of our promise to provide the best care and service to meet your aftercare needs.” If you have questions, or wish to unsubscribe from this service please call [phone].  Thank you for Choosing our [facility] Emergency Department.

## 2024-02-24 NOTE — DISCHARGE INSTRUCTIONS
Sips clear liquids 6-12 hours, then BRAT diet (bananas, rice, apple sauce, toast). Advance to soup/sanwiches as tolerated. Recheck your doctor 2 days if not improving. Recheck sooner for worse pain/fever/vomiting/bleeding.  Tylenol if fever.  If prescribed, phenergan or Zofran are for Nausea.  .  If prescribed, Lomotil is for diarrhea, but may use over-the-counter Imodium

## 2024-04-09 ENCOUNTER — OFFICE VISIT (OUTPATIENT)
Dept: PRIMARY CARE CLINIC | Facility: CLINIC | Age: 84
End: 2024-04-09
Payer: MEDICARE

## 2024-04-09 VITALS
WEIGHT: 207 LBS | HEIGHT: 66 IN | SYSTOLIC BLOOD PRESSURE: 124 MMHG | DIASTOLIC BLOOD PRESSURE: 84 MMHG | BODY MASS INDEX: 33.27 KG/M2 | OXYGEN SATURATION: 96 % | HEART RATE: 64 BPM

## 2024-04-09 DIAGNOSIS — J44.89 ASTHMA WITH COPD (HCC): ICD-10-CM

## 2024-04-09 DIAGNOSIS — E78.5 DYSLIPIDEMIA: ICD-10-CM

## 2024-04-09 DIAGNOSIS — R25.1 TREMOR: ICD-10-CM

## 2024-04-09 DIAGNOSIS — I25.10 ATHEROSCLEROSIS OF NATIVE CORONARY ARTERY OF NATIVE HEART WITHOUT ANGINA PECTORIS: ICD-10-CM

## 2024-04-09 DIAGNOSIS — E03.9 HYPOTHYROIDISM, UNSPECIFIED TYPE: ICD-10-CM

## 2024-04-09 DIAGNOSIS — R73.03 PREDIABETES: ICD-10-CM

## 2024-04-09 DIAGNOSIS — I10 ESSENTIAL HYPERTENSION: ICD-10-CM

## 2024-04-09 DIAGNOSIS — Z76.89 ENCOUNTER TO ESTABLISH CARE WITH NEW DOCTOR: Primary | ICD-10-CM

## 2024-04-09 LAB
ALBUMIN SERPL-MCNC: 3.7 G/DL (ref 3.2–4.6)
ALBUMIN/GLOB SERPL: 1.3 (ref 0.4–1.6)
ALP SERPL-CCNC: 98 U/L (ref 50–136)
ALT SERPL-CCNC: 39 U/L (ref 12–65)
ANION GAP SERPL CALC-SCNC: 3 MMOL/L (ref 2–11)
AST SERPL-CCNC: 27 U/L (ref 15–37)
BASOPHILS # BLD: 0.1 K/UL (ref 0–0.2)
BASOPHILS NFR BLD: 1 % (ref 0–2)
BILIRUB SERPL-MCNC: 0.7 MG/DL (ref 0.2–1.1)
BUN SERPL-MCNC: 16 MG/DL (ref 8–23)
CALCIUM SERPL-MCNC: 9.3 MG/DL (ref 8.3–10.4)
CHLORIDE SERPL-SCNC: 107 MMOL/L (ref 103–113)
CHOLEST SERPL-MCNC: 137 MG/DL
CO2 SERPL-SCNC: 28 MMOL/L (ref 21–32)
CREAT SERPL-MCNC: 0.9 MG/DL (ref 0.6–1)
DIFFERENTIAL METHOD BLD: NORMAL
EOSINOPHIL # BLD: 0.3 K/UL (ref 0–0.8)
EOSINOPHIL NFR BLD: 3 % (ref 0.5–7.8)
ERYTHROCYTE [DISTWIDTH] IN BLOOD BY AUTOMATED COUNT: 13.2 % (ref 11.9–14.6)
GLOBULIN SER CALC-MCNC: 2.8 G/DL (ref 2.8–4.5)
GLUCOSE SERPL-MCNC: 108 MG/DL (ref 65–100)
HCT VFR BLD AUTO: 40.9 % (ref 35.8–46.3)
HDLC SERPL-MCNC: 53 MG/DL (ref 40–60)
HDLC SERPL: 2.6
HGB BLD-MCNC: 13.9 G/DL (ref 11.7–15.4)
IMM GRANULOCYTES # BLD AUTO: 0 K/UL (ref 0–0.5)
IMM GRANULOCYTES NFR BLD AUTO: 0 % (ref 0–5)
LDLC SERPL CALC-MCNC: 52.2 MG/DL
LYMPHOCYTES # BLD: 1.8 K/UL (ref 0.5–4.6)
LYMPHOCYTES NFR BLD: 20 % (ref 13–44)
MCH RBC QN AUTO: 30.3 PG (ref 26.1–32.9)
MCHC RBC AUTO-ENTMCNC: 34 G/DL (ref 31.4–35)
MCV RBC AUTO: 89.1 FL (ref 82–102)
MONOCYTES # BLD: 0.7 K/UL (ref 0.1–1.3)
MONOCYTES NFR BLD: 8 % (ref 4–12)
NEUTS SEG # BLD: 6.1 K/UL (ref 1.7–8.2)
NEUTS SEG NFR BLD: 68 % (ref 43–78)
NRBC # BLD: 0 K/UL (ref 0–0.2)
PLATELET # BLD AUTO: 233 K/UL (ref 150–450)
PMV BLD AUTO: 10.8 FL (ref 9.4–12.3)
POTASSIUM SERPL-SCNC: 4.2 MMOL/L (ref 3.5–5.1)
PROT SERPL-MCNC: 6.5 G/DL (ref 6.3–8.2)
RBC # BLD AUTO: 4.59 M/UL (ref 4.05–5.2)
SODIUM SERPL-SCNC: 138 MMOL/L (ref 136–146)
TRIGL SERPL-MCNC: 159 MG/DL (ref 35–150)
TSH, 3RD GENERATION: 1.54 UIU/ML (ref 0.36–3.74)
VLDLC SERPL CALC-MCNC: 31.8 MG/DL (ref 6–23)
WBC # BLD AUTO: 9 K/UL (ref 4.3–11.1)

## 2024-04-09 PROCEDURE — 3079F DIAST BP 80-89 MM HG: CPT | Performed by: FAMILY MEDICINE

## 2024-04-09 PROCEDURE — 3074F SYST BP LT 130 MM HG: CPT | Performed by: FAMILY MEDICINE

## 2024-04-09 PROCEDURE — 99204 OFFICE O/P NEW MOD 45 MIN: CPT | Performed by: FAMILY MEDICINE

## 2024-04-09 PROCEDURE — 1123F ACP DISCUSS/DSCN MKR DOCD: CPT | Performed by: FAMILY MEDICINE

## 2024-04-09 RX ORDER — FUROSEMIDE 20 MG/1
20 TABLET ORAL DAILY PRN
Qty: 90 TABLET | Refills: 1 | Status: SHIPPED | OUTPATIENT
Start: 2024-04-09

## 2024-04-09 SDOH — ECONOMIC STABILITY: FOOD INSECURITY: WITHIN THE PAST 12 MONTHS, THE FOOD YOU BOUGHT JUST DIDN'T LAST AND YOU DIDN'T HAVE MONEY TO GET MORE.: NEVER TRUE

## 2024-04-09 SDOH — ECONOMIC STABILITY: HOUSING INSECURITY
IN THE LAST 12 MONTHS, WAS THERE A TIME WHEN YOU DID NOT HAVE A STEADY PLACE TO SLEEP OR SLEPT IN A SHELTER (INCLUDING NOW)?: PATIENT DECLINED

## 2024-04-09 SDOH — ECONOMIC STABILITY: FOOD INSECURITY: WITHIN THE PAST 12 MONTHS, YOU WORRIED THAT YOUR FOOD WOULD RUN OUT BEFORE YOU GOT MONEY TO BUY MORE.: NEVER TRUE

## 2024-04-09 SDOH — ECONOMIC STABILITY: INCOME INSECURITY: HOW HARD IS IT FOR YOU TO PAY FOR THE VERY BASICS LIKE FOOD, HOUSING, MEDICAL CARE, AND HEATING?: NOT HARD AT ALL

## 2024-04-09 ASSESSMENT — PATIENT HEALTH QUESTIONNAIRE - PHQ9
SUM OF ALL RESPONSES TO PHQ QUESTIONS 1-9: 0
SUM OF ALL RESPONSES TO PHQ9 QUESTIONS 1 & 2: 0
SUM OF ALL RESPONSES TO PHQ QUESTIONS 1-9: 0
2. FEELING DOWN, DEPRESSED OR HOPELESS: NOT AT ALL
1. LITTLE INTEREST OR PLEASURE IN DOING THINGS: NOT AT ALL
SUM OF ALL RESPONSES TO PHQ QUESTIONS 1-9: 0
SUM OF ALL RESPONSES TO PHQ QUESTIONS 1-9: 0

## 2024-04-09 ASSESSMENT — ENCOUNTER SYMPTOMS
COUGH: 0
SHORTNESS OF BREATH: 0
DIARRHEA: 0
ABDOMINAL PAIN: 0
VOMITING: 0
NAUSEA: 0

## 2024-04-09 NOTE — PATIENT INSTRUCTIONS
IT WAS GREAT TO SEE YOU TODAY!    I WILL CALL YOU WITH THE RESULTS OF YOUR LABS.    PLEASE TAKE ALL MEDICATION AS DISCUSSED.    I WILL SEE YOU AGAIN IN 6 MONTHS BUT PLEASE CALL WITH CONCERNS [phone]

## 2024-04-09 NOTE — PROGRESS NOTES
[facility]  Natalie Neely, DO  [address], [place] [address]  [phone]          ASSESSMENT AND PLAN    Problem List Items Addressed This Visit          Circulatory    Coronary atherosclerosis of native coronary artery      Stable s/p stent placement in 2012.  Doing well on Rosuvastatin and ASA.         Relevant Medications    furosemide (LASIX) 20 MG tablet    Other Relevant Orders    Comprehensive Metabolic Panel    CBC with Auto Differential    Lipid Panel    Essential hypertension      BP stable on Metoprolol, HCTZ, Amlodipine and Losartan.  Will check labs.         Relevant Medications    furosemide (LASIX) 20 MG tablet    Other Relevant Orders    Comprehensive Metabolic Panel    CBC with Auto Differential       Respiratory    Asthma with COPD (HCC)      Doing well on Symbicort.            Endocrine    Hypothyroid      Will check TSH today, doing well on 137 mcg daily.         Relevant Orders    TSH       Other    Dyslipidemia      Stable on Rosuvastatin, will check labs.         Relevant Orders    Lipid Panel    Encounter to establish care with new doctor - Primary    Prediabetes      Will check A1C.         Relevant Orders    Hemoglobin A1C        The diagnoses and plan were discussed with the patient, who verbalizes understanding and agrees with plan.  All questions answered.    Chief Complaint    Chief Complaint   Patient presents with    New Patient     Pt needs a PCP within our network.     Blood Work     Pt is in need of labs for the year.        HISTORY OF PRESENT ILLNESS    84 y.o. female presents today to establish care with a new primary care provider.  Used to see Dr. Dhillon at [facility] but insurance made her change PCPs.  Sees Dr. Lyon, Cardiology, for CAD.  Had a heart cath in 2012 and placed a stent.  Doing well on her medications, thinks she needs a refill of her Lasix, which she takes at night.  Does not wake her up to urinate at

## 2024-04-10 LAB
EST. AVERAGE GLUCOSE BLD GHB EST-MCNC: 120 MG/DL
HBA1C MFR BLD: 5.8 % (ref 4.8–5.6)

## 2024-08-19 ENCOUNTER — OFFICE VISIT (OUTPATIENT)
Age: 84
End: 2024-08-19
Payer: MEDICARE

## 2024-08-19 VITALS
HEIGHT: 66 IN | DIASTOLIC BLOOD PRESSURE: 60 MMHG | BODY MASS INDEX: 33.11 KG/M2 | WEIGHT: 206 LBS | SYSTOLIC BLOOD PRESSURE: 102 MMHG | HEART RATE: 62 BPM

## 2024-08-19 DIAGNOSIS — I25.118 ATHEROSCLEROSIS OF NATIVE CORONARY ARTERY OF NATIVE HEART WITH STABLE ANGINA PECTORIS (HCC): ICD-10-CM

## 2024-08-19 DIAGNOSIS — E78.5 DYSLIPIDEMIA: ICD-10-CM

## 2024-08-19 DIAGNOSIS — I25.10 ATHEROSCLEROSIS OF NATIVE CORONARY ARTERY OF NATIVE HEART WITHOUT ANGINA PECTORIS: Primary | ICD-10-CM

## 2024-08-19 DIAGNOSIS — I10 ESSENTIAL HYPERTENSION: ICD-10-CM

## 2024-08-19 PROCEDURE — 99214 OFFICE O/P EST MOD 30 MIN: CPT | Performed by: INTERNAL MEDICINE

## 2024-08-19 PROCEDURE — 3074F SYST BP LT 130 MM HG: CPT | Performed by: INTERNAL MEDICINE

## 2024-08-19 PROCEDURE — 3078F DIAST BP <80 MM HG: CPT | Performed by: INTERNAL MEDICINE

## 2024-08-19 PROCEDURE — 1123F ACP DISCUSS/DSCN MKR DOCD: CPT | Performed by: INTERNAL MEDICINE

## 2024-08-19 ASSESSMENT — ENCOUNTER SYMPTOMS
SHORTNESS OF BREATH: 0
BACK PAIN: 0
ABDOMINAL PAIN: 0
COUGH: 0

## 2024-08-19 NOTE — PROGRESS NOTES
Priority: High    Atherosclerosis of native coronary artery of native heart with stable angina pectoris (HCC) 05/15/2012     Priority: High     2012:  3.0x18 Xience DARON Ellis D1   2017:  Normal stress MPI        Dyslipidemia 05/15/2012     Priority: High    Essential hypertension 10/22/2018     Priority: Low    Hypothyroid 2013     Priority: Low    History of tobacco use 2013     Priority: Low    PPD positive 2013     Priority: Low    Allergic rhinitis 2013     Priority: Low    Asthma with COPD (HCC) 2013     Priority: Low    Tremor 2012     Priority: Low    Encounter to establish care with new doctor 2024    Prediabetes 2024      Family History   Problem Relation Age of Onset    Dementia Sister     Diabetes Sister     Hypertension Sister     Osteoarthritis Father     Rheum Arthritis Father     Vision Loss Father     Heart Disease Mother         Conj. Heart Failure    Osteoarthritis Mother     Hypertension Brother     Diabetes Brother         insulin controlled    Hypertension Brother     Cancer Brother         cancer free    Hypertension Brother     Hypertension Brother     Breast Cancer Neg Hx      Social History     Tobacco Use    Smoking status: Former     Current packs/day: 0.00     Average packs/day: 0.3 packs/day for 13.0 years (3.3 ttl pk-yrs)     Types: Cigarettes     Start date: 1957     Quit date: 1970     Years since quittin.1    Smokeless tobacco: Never    Tobacco comments:     railsed on tobacco farm   Substance Use Topics    Alcohol use: Not Currently       Review Of Symptoms    Review of Systems   Constitutional: Negative for fever and malaise/fatigue.   HENT:  Negative for nosebleeds.    Cardiovascular:  Negative for chest pain, dyspnea on exertion and palpitations.   Respiratory:  Negative for cough and shortness of breath.    Musculoskeletal:  Negative for back pain, muscle cramps, muscle weakness and myalgias.   Gastrointestinal:

## 2024-09-09 ENCOUNTER — TELEPHONE (OUTPATIENT)
Dept: PHARMACY | Facility: CLINIC | Age: 84
End: 2024-09-09

## 2024-09-18 RX ORDER — HYDROCHLOROTHIAZIDE 12.5 MG/1
12.5 TABLET ORAL DAILY
Qty: 90 TABLET | Refills: 1 | Status: SHIPPED | OUTPATIENT
Start: 2024-09-18

## 2024-10-09 ENCOUNTER — OFFICE VISIT (OUTPATIENT)
Dept: PRIMARY CARE CLINIC | Facility: CLINIC | Age: 84
End: 2024-10-09
Payer: MEDICARE

## 2024-10-09 VITALS
TEMPERATURE: 97.7 F | OXYGEN SATURATION: 94 % | DIASTOLIC BLOOD PRESSURE: 80 MMHG | SYSTOLIC BLOOD PRESSURE: 118 MMHG | BODY MASS INDEX: 34.32 KG/M2 | HEART RATE: 74 BPM | HEIGHT: 65 IN | WEIGHT: 206 LBS

## 2024-10-09 DIAGNOSIS — E03.9 HYPOTHYROIDISM, UNSPECIFIED TYPE: ICD-10-CM

## 2024-10-09 DIAGNOSIS — R73.03 PREDIABETES: ICD-10-CM

## 2024-10-09 DIAGNOSIS — I10 ESSENTIAL HYPERTENSION: Primary | ICD-10-CM

## 2024-10-09 DIAGNOSIS — J44.89 ASTHMA WITH COPD (HCC): ICD-10-CM

## 2024-10-09 DIAGNOSIS — E78.5 DYSLIPIDEMIA: ICD-10-CM

## 2024-10-09 PROCEDURE — 1123F ACP DISCUSS/DSCN MKR DOCD: CPT | Performed by: FAMILY MEDICINE

## 2024-10-09 PROCEDURE — 3074F SYST BP LT 130 MM HG: CPT | Performed by: FAMILY MEDICINE

## 2024-10-09 PROCEDURE — 3079F DIAST BP 80-89 MM HG: CPT | Performed by: FAMILY MEDICINE

## 2024-10-09 PROCEDURE — 99214 OFFICE O/P EST MOD 30 MIN: CPT | Performed by: FAMILY MEDICINE

## 2024-10-09 RX ORDER — METOPROLOL SUCCINATE 25 MG/1
25 TABLET, EXTENDED RELEASE ORAL DAILY
Qty: 90 TABLET | Refills: 2 | Status: SHIPPED | OUTPATIENT
Start: 2024-10-09

## 2024-10-09 RX ORDER — FLUTICASONE PROPIONATE 50 MCG
2 SPRAY, SUSPENSION (ML) NASAL DAILY
Qty: 3 EACH | Refills: 2 | Status: SHIPPED | OUTPATIENT
Start: 2024-10-09

## 2024-10-09 RX ORDER — LOSARTAN POTASSIUM 100 MG/1
100 TABLET ORAL DAILY
Qty: 90 TABLET | Refills: 2 | Status: SHIPPED | OUTPATIENT
Start: 2024-10-09

## 2024-10-09 RX ORDER — ROSUVASTATIN CALCIUM 10 MG/1
10 TABLET, COATED ORAL DAILY
Qty: 90 TABLET | Refills: 2 | Status: SHIPPED | OUTPATIENT
Start: 2024-10-09

## 2024-10-09 RX ORDER — LEVOTHYROXINE SODIUM 137 UG/1
137 TABLET ORAL DAILY
Qty: 90 TABLET | Refills: 2 | Status: SHIPPED | OUTPATIENT
Start: 2024-10-09

## 2024-10-09 RX ORDER — HYDROCHLOROTHIAZIDE 12.5 MG/1
12.5 TABLET ORAL DAILY
Qty: 90 TABLET | Refills: 2 | Status: SHIPPED | OUTPATIENT
Start: 2024-10-09

## 2024-10-09 RX ORDER — AMLODIPINE BESYLATE 5 MG/1
5 TABLET ORAL DAILY
Qty: 90 TABLET | Refills: 2 | Status: SHIPPED | OUTPATIENT
Start: 2024-10-09

## 2024-10-09 ASSESSMENT — ENCOUNTER SYMPTOMS
COUGH: 0
SHORTNESS OF BREATH: 0
NAUSEA: 0
DIARRHEA: 0
ABDOMINAL PAIN: 0
VOMITING: 0

## 2024-10-09 NOTE — PROGRESS NOTES
[facility]  Natalie Neely, DO  [address], [address]  [place] [id number] [phone]         ASSESSMENT AND PLAN    Problem List Items Addressed This Visit          Circulatory    Essential hypertension - Primary     Chronic, stable on Metoprolol, Losartan, Amlodipine and HCTZ.  Reviewed labs from last visit.  Refills sent to the pharmacy.         Relevant Medications    hydroCHLOROthiazide 12.5 MG tablet    losartan (COZAAR) 100 MG tablet    rosuvastatin (CRESTOR) 10 MG tablet    amLODIPine (NORVASC) 5 MG tablet    metoprolol succinate (TOPROL XL) 25 MG extended release tablet       Respiratory    Asthma with COPD (HCC)      Chronic, doing well on Flonase and Symbicort.  Sees Pulmonology next month.          Relevant Medications    fluticasone (FLONASE) 50 MCG/ACT nasal spray       Endocrine    Hypothyroid      Chronic, last TSH WNL.  Refill sent for Levothyroxine.         Relevant Medications    levothyroxine (SYNTHROID) 137 MCG tablet       Other    Dyslipidemia     Chronic, stable on Rosuvastatin.  Reviewed lipids from last visit - only elevated triglycerides.  Will refill Rosuvastatin.         Prediabetes     New finding on last labs, patient is working on cutting back on sugar and starches.              The diagnoses and plan were discussed with the patient, who verbalizes understanding and agrees with plan.  All questions answered.    Chief Complaint    Chief Complaint   Patient presents with    Follow-up    Annual Exam     Wants flu shot          HISTORY OF PRESENT ILLNESS    84 y.o. female with CAD presents today for follow up.  Last seen six months ago to establish care, had labs checked.  Needs her flu shot.  Notes that she and her daughter were both exposed to her  when he got COVID but states that she never got sick.  Has been seeing [facility] [place], Vascular Surgery, who is treating her varicose veins but notes that they are much better.  Has seen

## 2024-10-09 NOTE — ASSESSMENT & PLAN NOTE
Chronic, stable on Rosuvastatin.  Reviewed lipids from last visit - only elevated triglycerides.  Will refill Rosuvastatin.

## 2024-10-09 NOTE — ASSESSMENT & PLAN NOTE
Chronic, stable on Metoprolol, Losartan, Amlodipine and HCTZ.  Reviewed labs from last visit.  Refills sent to the pharmacy.

## 2024-10-09 NOTE — PATIENT INSTRUCTIONS
IT WAS GREAT TO SEE YOU TODAY!    WE WILL CHECK LABS WHEN YOU RETURN SO PLEASE COME FASTING.    PLEASE TAKE ALL MEDICATION AS DISCUSSED.  LET ME KNOW IF YOU NEED REFILLS.    PLEASE WORK ON DIET - EAT MORE LEAN PROTEINS (CHICKEN, FISH, BEANS, TURKEY), FRUITS, VEGETABLES AND DRINK MORE WATER.  EAT LESS RED MEAT, DAIRY PRODUCTS, PROCESSED STARCHES (WHITE RICE, PASTA, WHITE BREAD, TORTILLAS, CHIPS, BAKED GOODS, CANDY), AND DRINK LESS SODA, ENERGY DRINKS, JUICE, TEA, COFFEE DRINKS AND ALCOHOL.  TRY TO EAT THREE MEALS A DAY WITH SOME SORT OF PROTEIN AND TRY TO CUT BACK ON SNACKS (UNLESS IT IS HEALTHY - VEGGIES AND HUMMUS, ONE SERVING OF UNSALTED NUTS, ONE SERVING OF FRUIT, ETC).  PAY ATTENTION TO SERVING SIZES ON THE PACKAGES SO YOU DO NOT EAT LARGER PORTIONS.      I WILL SEE YOU AGAIN IN 6 MONTHS BUT PLEASE CALL WITH CONCERNS [phone]

## 2024-11-21 NOTE — PROGRESS NOTES
[facility]  [address]  [place] [address]  [phone]    Patient Name:  Florinda Crooks    YOB: 1940    Office Visit 2024      CHIEF COMPLAINT:      Chief Complaint   Patient presents with    Asthma    COPD         ASSESSMENT:   (Medical Decision Making)                                                                                                                                          Encounter Diagnoses   Name Primary?    Asthma with COPD (HCC) Yes    PPD positive     Obesity (BMI 30-39.9)      She is stable symptomatically, taking Symbicort once daily.  She has not required albuterol recently.  Spirometry is normal.    No evidence of active TB.    BMI is 33.  Weight loss is advised.                      PLAN:     Continue Symbicort 2 puffs once daily if she is doing.  However, advised to increase to standard dose of 2 puffs twice daily, gargle with water after use, if she has shortness of breath, wheezing or need for albuterol inhaler.    Albuterol inhaler 2 puffs 4 times daily if needed for shortness of breath or wheezing.    She is up-to-date on seasonal flu vaccine, RSV vaccine.    Recommend Prevnar 20 pneumonia vaccine if insurance will reimburse for it.  She has had PPSV23 at age 68 and had Prevnar 13, 9 years ago.  She has decided against COVID boosters.    Follow-up in 1 year with spirometry, sooner if needed.    Orders Placed This Encounter   Procedures    Spirometry Without Bronchodilator     Standing Status:   Future     Number of Occurrences:   1     Standing Expiration Date:   2025       Orders Placed This Encounter   Medications    budesonide-formoterol (SYMBICORT) 80-4.5 MCG/ACT AERO     Si puffs twice daily, rinse mouth after use.  Patient will call when refills are needed     Dispense:  30.6 g     Refill:  3    albuterol sulfate HFA (PROVENTIL;VENTOLIN;PROAIR) 108 (90 Base) MCG/ACT inhaler     Si puffs 4 times

## 2024-11-22 ENCOUNTER — OFFICE VISIT (OUTPATIENT)
Dept: PULMONOLOGY | Age: 84
End: 2024-11-22

## 2024-11-22 VITALS
HEIGHT: 66 IN | RESPIRATION RATE: 18 BRPM | TEMPERATURE: 97.3 F | DIASTOLIC BLOOD PRESSURE: 76 MMHG | WEIGHT: 206.9 LBS | HEART RATE: 81 BPM | OXYGEN SATURATION: 92 % | SYSTOLIC BLOOD PRESSURE: 136 MMHG | BODY MASS INDEX: 33.25 KG/M2

## 2024-11-22 DIAGNOSIS — R76.11 PPD POSITIVE: ICD-10-CM

## 2024-11-22 DIAGNOSIS — E66.9 OBESITY (BMI 30-39.9): ICD-10-CM

## 2024-11-22 DIAGNOSIS — J44.89 ASTHMA WITH COPD (HCC): Primary | ICD-10-CM

## 2024-11-22 LAB
EXPIRATORY TIME: NORMAL
FEF 25-75% %PRED-PRE: NORMAL
FEF 25-75% PRED: NORMAL
FEF 25-75-PRE: NORMAL
FEV1 %PRED-PRE: 79 %
FEV1 PRED: 1.92 L
FEV1/FVC %PRED-PRE: NORMAL
FEV1/FVC PRED: NORMAL
FEV1/FVC: 70 %
FEV1: 1.52 L
FVC %PRED-PRE: 86 %
FVC PRED: 2.56 L
FVC: 2.19 L
PEF %PRED-PRE: NORMAL
PEF PRED: NORMAL
PEF-PRE: NORMAL

## 2024-11-22 RX ORDER — BUDESONIDE AND FORMOTEROL FUMARATE DIHYDRATE 80; 4.5 UG/1; UG/1
AEROSOL RESPIRATORY (INHALATION)
Qty: 30.6 G | Refills: 3 | Status: SHIPPED | OUTPATIENT
Start: 2024-11-22

## 2024-11-22 RX ORDER — ALBUTEROL SULFATE 90 UG/1
INHALANT RESPIRATORY (INHALATION)
Qty: 3 EACH | Refills: 3 | Status: SHIPPED | OUTPATIENT
Start: 2024-11-22

## 2024-11-22 ASSESSMENT — PULMONARY FUNCTION TESTS
FEV1_PREDICTED: 1.92
FVC_PREDICTED: 2.56
FVC_PERCENT_PREDICTED_PRE: 86
FEV1/FVC: 70
FEV1_PERCENT_PREDICTED_PRE: 79
FVC: 2.19
FEV1: 1.52

## 2024-11-22 ASSESSMENT — ENCOUNTER SYMPTOMS
HEMOPTYSIS: 0
WHEEZING: 0
SPUTUM PRODUCTION: 0
SHORTNESS OF BREATH: 0
COUGH: 0

## 2024-11-22 NOTE — PATIENT INSTRUCTIONS
Continue Symbicort 2 puffs once daily if she is doing.  However, advised to increase to standard dose of 2 puffs twice daily, gargle with water after use, if she has shortness of breath, wheezing or need for albuterol inhaler.    Albuterol inhaler 2 puffs 4 times daily if needed for shortness of breath or wheezing.    She is up-to-date on seasonal flu vaccine, RSV vaccine.    Recommend Prevnar 20 pneumonia vaccine if insurance will reimburse for it.  She has had PPSV23 at age 68 and had Prevnar 13, 9 years ago.  She has decided against COVID boosters.    Weight loss is advised.    Follow-up in 1 year with spirometry, sooner if needed.

## 2025-01-16 ENCOUNTER — TRANSCRIBE ORDERS (OUTPATIENT)
Dept: SCHEDULING | Age: 85
End: 2025-01-16

## 2025-01-16 DIAGNOSIS — Z12.31 VISIT FOR SCREENING MAMMOGRAM: Primary | ICD-10-CM

## 2025-02-17 ENCOUNTER — OFFICE VISIT (OUTPATIENT)
Age: 85
End: 2025-02-17
Payer: MEDICARE

## 2025-02-17 VITALS
HEIGHT: 65 IN | HEART RATE: 61 BPM | DIASTOLIC BLOOD PRESSURE: 70 MMHG | SYSTOLIC BLOOD PRESSURE: 128 MMHG | BODY MASS INDEX: 34.82 KG/M2 | WEIGHT: 209 LBS

## 2025-02-17 DIAGNOSIS — E78.5 DYSLIPIDEMIA: ICD-10-CM

## 2025-02-17 DIAGNOSIS — I25.118 ATHEROSCLEROSIS OF NATIVE CORONARY ARTERY OF NATIVE HEART WITH STABLE ANGINA PECTORIS: ICD-10-CM

## 2025-02-17 DIAGNOSIS — I25.10 ATHEROSCLEROSIS OF NATIVE CORONARY ARTERY OF NATIVE HEART WITHOUT ANGINA PECTORIS: Primary | ICD-10-CM

## 2025-02-17 DIAGNOSIS — I10 ESSENTIAL HYPERTENSION: ICD-10-CM

## 2025-02-17 PROCEDURE — 3074F SYST BP LT 130 MM HG: CPT | Performed by: INTERNAL MEDICINE

## 2025-02-17 PROCEDURE — 93000 ELECTROCARDIOGRAM COMPLETE: CPT | Performed by: INTERNAL MEDICINE

## 2025-02-17 PROCEDURE — 3078F DIAST BP <80 MM HG: CPT | Performed by: INTERNAL MEDICINE

## 2025-02-17 PROCEDURE — 1126F AMNT PAIN NOTED NONE PRSNT: CPT | Performed by: INTERNAL MEDICINE

## 2025-02-17 PROCEDURE — 99214 OFFICE O/P EST MOD 30 MIN: CPT | Performed by: INTERNAL MEDICINE

## 2025-02-17 PROCEDURE — 1123F ACP DISCUSS/DSCN MKR DOCD: CPT | Performed by: INTERNAL MEDICINE

## 2025-02-17 ASSESSMENT — ENCOUNTER SYMPTOMS
BACK PAIN: 0
ABDOMINAL PAIN: 0
SHORTNESS OF BREATH: 0
COUGH: 0

## 2025-02-17 NOTE — PROGRESS NOTES
[facility]  [address] [place] [address], [address]  [place] [address]      25      NAME:  Florinda Crooks  : 1940  MRN: [id number]      SUBJECTIVE:   Florinda Crooks is a 84 y.o. female seen for a follow up visit regarding the following:     Chief Complaint   Patient presents with    Coronary Artery Disease    Follow-up       HPI:    84 y.o. female with obesity, hypertension, dyslipidemia and a strong family history of cardiomyopathy but no CAD.  She has established CAD with PCI in .  She has history of asthma and frequent bronchitis. Her cholesterol and blood pressure are well controlled.  Her weight remains unchanged. Echo 2023 with normal LVEF and no valve disease.            Past Medical History, Past Surgical History, Family history, Social History, and Medications were all reviewed with the patient today and updated as necessary.     Current Outpatient Medications   Medication Sig Dispense Refill    budesonide-formoterol (SYMBICORT) 80-4.5 MCG/ACT AERO 2 puffs twice daily, rinse mouth after use.  Patient will call when refills are needed 30.6 g 3    fluticasone (FLONASE) 50 MCG/ACT nasal spray 2 sprays by Nasal route daily 3 each 2    hydroCHLOROthiazide 12.5 MG tablet Take 1 tablet by mouth daily 90 tablet 2    losartan (COZAAR) 100 MG tablet Take 1 tablet by mouth daily 90 tablet 2    rosuvastatin (CRESTOR) 10 MG tablet Take 1 tablet by mouth daily 90 tablet 2    levothyroxine (SYNTHROID) 137 MCG tablet Take 1 tablet by mouth Daily TAKE 1 TABLET BY MOUTH DAILY 90 tablet 2    amLODIPine (NORVASC) 5 MG tablet Take 1 tablet by mouth daily 90 tablet 2    metoprolol succinate (TOPROL XL) 25 MG extended release tablet Take 1 tablet by mouth daily TAKE 1 TABLET BY MOUTH DAILY 90 tablet 2    Multiple Vitamin (MULTI-DAY PO) Take by mouth      aspirin 81 MG chewable tablet Take 1 tablet by mouth daily      albuterol sulfate HFA (PROVENTIL;VENTOLIN;PROAIR) 108 (90 Base) MCG/ACT inhaler 2

## 2025-02-28 ENCOUNTER — HOSPITAL ENCOUNTER (OUTPATIENT)
Dept: MAMMOGRAPHY | Age: 85
Discharge: HOME | End: 2025-02-28
Attending: FAMILY MEDICINE
Payer: MEDICARE

## 2025-02-28 DIAGNOSIS — Z12.31 VISIT FOR SCREENING MAMMOGRAM: ICD-10-CM

## 2025-02-28 PROCEDURE — 77067 SCR MAMMO BI INCL CAD: CPT

## 2025-02-28 PROCEDURE — 77063 BREAST TOMOSYNTHESIS BI: CPT

## 2025-04-05 SDOH — ECONOMIC STABILITY: TRANSPORTATION INSECURITY
IN THE PAST 12 MONTHS, HAS THE LACK OF TRANSPORTATION KEPT YOU FROM MEDICAL APPOINTMENTS OR FROM GETTING MEDICATIONS?: NO

## 2025-04-05 SDOH — ECONOMIC STABILITY: INCOME INSECURITY: IN THE LAST 12 MONTHS, WAS THERE A TIME WHEN YOU WERE NOT ABLE TO PAY THE MORTGAGE OR RENT ON TIME?: NO

## 2025-04-05 SDOH — HEALTH STABILITY: PHYSICAL HEALTH: ON AVERAGE, HOW MANY DAYS PER WEEK DO YOU ENGAGE IN MODERATE TO STRENUOUS EXERCISE (LIKE A BRISK WALK)?: 3 DAYS

## 2025-04-05 SDOH — ECONOMIC STABILITY: FOOD INSECURITY: WITHIN THE PAST 12 MONTHS, YOU WORRIED THAT YOUR FOOD WOULD RUN OUT BEFORE YOU GOT MONEY TO BUY MORE.: NEVER TRUE

## 2025-04-05 SDOH — ECONOMIC STABILITY: FOOD INSECURITY: WITHIN THE PAST 12 MONTHS, THE FOOD YOU BOUGHT JUST DIDN'T LAST AND YOU DIDN'T HAVE MONEY TO GET MORE.: NEVER TRUE

## 2025-04-05 SDOH — HEALTH STABILITY: PHYSICAL HEALTH: ON AVERAGE, HOW MANY MINUTES DO YOU ENGAGE IN EXERCISE AT THIS LEVEL?: 60 MIN

## 2025-04-05 SDOH — ECONOMIC STABILITY: TRANSPORTATION INSECURITY
IN THE PAST 12 MONTHS, HAS LACK OF TRANSPORTATION KEPT YOU FROM MEETINGS, WORK, OR FROM GETTING THINGS NEEDED FOR DAILY LIVING?: NO

## 2025-04-05 ASSESSMENT — PATIENT HEALTH QUESTIONNAIRE - PHQ9
SUM OF ALL RESPONSES TO PHQ QUESTIONS 1-9: 0
1. LITTLE INTEREST OR PLEASURE IN DOING THINGS: NOT AT ALL
SUM OF ALL RESPONSES TO PHQ QUESTIONS 1-9: 0
SUM OF ALL RESPONSES TO PHQ QUESTIONS 1-9: 0
2. FEELING DOWN, DEPRESSED OR HOPELESS: NOT AT ALL
SUM OF ALL RESPONSES TO PHQ QUESTIONS 1-9: 0

## 2025-04-05 ASSESSMENT — LIFESTYLE VARIABLES
HOW OFTEN DO YOU HAVE A DRINK CONTAINING ALCOHOL: 2
HOW OFTEN DO YOU HAVE SIX OR MORE DRINKS ON ONE OCCASION: 1
HOW OFTEN DO YOU HAVE A DRINK CONTAINING ALCOHOL: MONTHLY OR LESS
HOW MANY STANDARD DRINKS CONTAINING ALCOHOL DO YOU HAVE ON A TYPICAL DAY: 1
HOW MANY STANDARD DRINKS CONTAINING ALCOHOL DO YOU HAVE ON A TYPICAL DAY: 1 OR 2

## 2025-04-07 ENCOUNTER — OFFICE VISIT (OUTPATIENT)
Dept: PRIMARY CARE CLINIC | Facility: CLINIC | Age: 85
End: 2025-04-07
Payer: MEDICARE

## 2025-04-07 VITALS
SYSTOLIC BLOOD PRESSURE: 126 MMHG | WEIGHT: 204 LBS | TEMPERATURE: 97.5 F | HEART RATE: 64 BPM | HEIGHT: 66 IN | OXYGEN SATURATION: 95 % | DIASTOLIC BLOOD PRESSURE: 64 MMHG | BODY MASS INDEX: 32.78 KG/M2

## 2025-04-07 DIAGNOSIS — E66.9 OBESITY (BMI 30-39.9): ICD-10-CM

## 2025-04-07 DIAGNOSIS — R73.03 PREDIABETES: ICD-10-CM

## 2025-04-07 DIAGNOSIS — I10 ESSENTIAL HYPERTENSION: ICD-10-CM

## 2025-04-07 DIAGNOSIS — R25.1 TREMOR: ICD-10-CM

## 2025-04-07 DIAGNOSIS — J44.89 ASTHMA WITH COPD (HCC): ICD-10-CM

## 2025-04-07 DIAGNOSIS — E78.5 DYSLIPIDEMIA: ICD-10-CM

## 2025-04-07 DIAGNOSIS — J30.9 ALLERGIC RHINITIS, UNSPECIFIED SEASONALITY, UNSPECIFIED TRIGGER: ICD-10-CM

## 2025-04-07 DIAGNOSIS — Z00.00 MEDICARE ANNUAL WELLNESS VISIT, SUBSEQUENT: Primary | ICD-10-CM

## 2025-04-07 DIAGNOSIS — E03.9 HYPOTHYROIDISM, UNSPECIFIED TYPE: ICD-10-CM

## 2025-04-07 DIAGNOSIS — R25.2 LEG CRAMPS: ICD-10-CM

## 2025-04-07 LAB
ALBUMIN SERPL-MCNC: 3.5 G/DL (ref 3.2–4.6)
ALBUMIN/GLOB SERPL: 1.3 (ref 1–1.9)
ALP SERPL-CCNC: 75 U/L (ref 35–104)
ALT SERPL-CCNC: 34 U/L (ref 8–45)
ANION GAP SERPL CALC-SCNC: 8 MMOL/L (ref 7–16)
AST SERPL-CCNC: 29 U/L (ref 15–37)
BASOPHILS # BLD: 0.08 K/UL (ref 0–0.2)
BASOPHILS NFR BLD: 1.3 % (ref 0–2)
BILIRUB SERPL-MCNC: 0.5 MG/DL (ref 0–1.2)
BUN SERPL-MCNC: 13 MG/DL (ref 8–23)
CALCIUM SERPL-MCNC: 9.4 MG/DL (ref 8.8–10.2)
CHLORIDE SERPL-SCNC: 102 MMOL/L (ref 98–107)
CHOLEST SERPL-MCNC: 131 MG/DL (ref 0–200)
CO2 SERPL-SCNC: 27 MMOL/L (ref 20–29)
CREAT SERPL-MCNC: 0.77 MG/DL (ref 0.6–1.1)
DIFFERENTIAL METHOD BLD: NORMAL
EOSINOPHIL # BLD: 0.2 K/UL (ref 0–0.8)
EOSINOPHIL NFR BLD: 3.2 % (ref 0.5–7.8)
ERYTHROCYTE [DISTWIDTH] IN BLOOD BY AUTOMATED COUNT: 13.3 % (ref 11.9–14.6)
EST. AVERAGE GLUCOSE BLD GHB EST-MCNC: 129 MG/DL
GLOBULIN SER CALC-MCNC: 2.7 G/DL (ref 2.3–3.5)
GLUCOSE SERPL-MCNC: 96 MG/DL (ref 70–99)
HBA1C MFR BLD: 6.1 % (ref 0–5.6)
HCT VFR BLD AUTO: 40.8 % (ref 35.8–46.3)
HDLC SERPL-MCNC: 46 MG/DL (ref 40–60)
HDLC SERPL: 2.8 (ref 0–5)
HGB BLD-MCNC: 13.8 G/DL (ref 11.7–15.4)
IMM GRANULOCYTES # BLD AUTO: 0.05 K/UL (ref 0–0.5)
IMM GRANULOCYTES NFR BLD AUTO: 0.8 % (ref 0–5)
LDLC SERPL CALC-MCNC: 57 MG/DL (ref 0–100)
LYMPHOCYTES # BLD: 1.4 K/UL (ref 0.5–4.6)
LYMPHOCYTES NFR BLD: 22.4 % (ref 13–44)
MAGNESIUM SERPL-MCNC: 2.1 MG/DL (ref 1.8–2.4)
MCH RBC QN AUTO: 30.1 PG (ref 26.1–32.9)
MCHC RBC AUTO-ENTMCNC: 33.8 G/DL (ref 31.4–35)
MCV RBC AUTO: 89.1 FL (ref 82–102)
MONOCYTES # BLD: 0.45 K/UL (ref 0.1–1.3)
MONOCYTES NFR BLD: 7.2 % (ref 4–12)
NEUTS SEG # BLD: 4.08 K/UL (ref 1.7–8.2)
NEUTS SEG NFR BLD: 65.1 % (ref 43–78)
NRBC # BLD: 0 K/UL (ref 0–0.2)
PLATELET # BLD AUTO: 277 K/UL (ref 150–450)
PMV BLD AUTO: 10.9 FL (ref 9.4–12.3)
POTASSIUM SERPL-SCNC: 4.6 MMOL/L (ref 3.5–5.1)
PROT SERPL-MCNC: 6.2 G/DL (ref 6.3–8.2)
RBC # BLD AUTO: 4.58 M/UL (ref 4.05–5.2)
SODIUM SERPL-SCNC: 138 MMOL/L (ref 136–145)
TRIGL SERPL-MCNC: 137 MG/DL (ref 0–150)
TSH, 3RD GENERATION: 1.04 UIU/ML (ref 0.27–4.2)
VLDLC SERPL CALC-MCNC: 27 MG/DL (ref 6–23)
WBC # BLD AUTO: 6.3 K/UL (ref 4.3–11.1)

## 2025-04-07 PROCEDURE — 99214 OFFICE O/P EST MOD 30 MIN: CPT | Performed by: FAMILY MEDICINE

## 2025-04-07 PROCEDURE — G0439 PPPS, SUBSEQ VISIT: HCPCS | Performed by: FAMILY MEDICINE

## 2025-04-07 PROCEDURE — 1123F ACP DISCUSS/DSCN MKR DOCD: CPT | Performed by: FAMILY MEDICINE

## 2025-04-07 PROCEDURE — 1160F RVW MEDS BY RX/DR IN RCRD: CPT | Performed by: FAMILY MEDICINE

## 2025-04-07 PROCEDURE — 1159F MED LIST DOCD IN RCRD: CPT | Performed by: FAMILY MEDICINE

## 2025-04-07 PROCEDURE — 1126F AMNT PAIN NOTED NONE PRSNT: CPT | Performed by: FAMILY MEDICINE

## 2025-04-07 RX ORDER — FLUTICASONE PROPIONATE 50 MCG
2 SPRAY, SUSPENSION (ML) NASAL DAILY
Qty: 3 EACH | Refills: 2 | Status: SHIPPED | OUTPATIENT
Start: 2025-04-07

## 2025-04-07 RX ORDER — METOPROLOL SUCCINATE 25 MG/1
25 TABLET, EXTENDED RELEASE ORAL 2 TIMES DAILY
Qty: 180 TABLET | Refills: 2 | Status: SHIPPED | OUTPATIENT
Start: 2025-04-07

## 2025-04-07 RX ORDER — HYDROCHLOROTHIAZIDE 12.5 MG/1
12.5 TABLET ORAL DAILY
Qty: 90 TABLET | Refills: 2 | Status: SHIPPED | OUTPATIENT
Start: 2025-04-07

## 2025-04-07 RX ORDER — AMLODIPINE BESYLATE 5 MG/1
5 TABLET ORAL DAILY
Qty: 90 TABLET | Refills: 2 | Status: SHIPPED | OUTPATIENT
Start: 2025-04-07

## 2025-04-07 RX ORDER — ROSUVASTATIN CALCIUM 10 MG/1
10 TABLET, COATED ORAL DAILY
Qty: 90 TABLET | Refills: 2 | Status: SHIPPED | OUTPATIENT
Start: 2025-04-07

## 2025-04-07 RX ORDER — METOPROLOL SUCCINATE 25 MG/1
25 TABLET, EXTENDED RELEASE ORAL DAILY
Qty: 90 TABLET | Refills: 1 | Status: CANCELLED | OUTPATIENT
Start: 2025-04-07

## 2025-04-07 RX ORDER — LEVOTHYROXINE SODIUM 137 UG/1
137 TABLET ORAL DAILY
Qty: 90 TABLET | Refills: 2 | Status: SHIPPED | OUTPATIENT
Start: 2025-04-07

## 2025-04-07 RX ORDER — LOSARTAN POTASSIUM 100 MG/1
100 TABLET ORAL DAILY
Qty: 90 TABLET | Refills: 2 | Status: SHIPPED | OUTPATIENT
Start: 2025-04-07

## 2025-04-07 ASSESSMENT — ENCOUNTER SYMPTOMS
DIARRHEA: 0
ABDOMINAL PAIN: 0
NAUSEA: 0
COUGH: 0
SHORTNESS OF BREATH: 0
VOMITING: 0

## 2025-04-07 NOTE — PROGRESS NOTES
her mother had a tremor and her maternal grandmother.  Notes that her asthma is doing well.  Denies chest pain or palpitations.  Scheduled for a stress test and Echo per Cardiology in August.      Patient's complete Health Risk Assessment and screening values have been reviewed and are found in Flowsheets. The following problems were reviewed today and where indicated follow up appointments were made and/or referrals ordered.    Positive Risk Factor Screenings with Interventions:                Abnormal BMI (obese):  Body mass index is 32.93 kg/m². (!) Abnormal  Interventions:  Patient hoping to get out more to walk and swim during the summer         Safety:  Do you have any tripping hazards - loose or unsecured carpets or rugs?: (!) (Patient-Rptd) Yes  Do you have non-slip mats or non-slip surfaces or shower bars or grab bars in your shower or bathtub?: (!) (Patient-Rptd) No  Interventions:  Non-slip rugs that are not loose      Review of Systems   Constitutional:  Negative for fever.   HENT:  Negative for congestion.    Respiratory:  Negative for cough and shortness of breath.    Cardiovascular:  Negative for chest pain.   Gastrointestinal:  Negative for abdominal pain, diarrhea, nausea and vomiting.   Neurological:  Positive for tremors.   Psychiatric/Behavioral:  Negative for dysphoric mood.              Objective   Vitals:    04/07/25 0815   BP: 126/64   Pulse: 64   Temp: 97.5 °F (36.4 °C)   SpO2: 95%   Weight: 92.5 kg (204 lb)   Height: 1.676 m (5' 6\")      Body mass index is 32.93 kg/m².        Physical Exam  Vitals and nursing note reviewed.   Constitutional:       General: She is not in acute distress.     Appearance: Normal appearance. She is obese.   HENT:      Head: Normocephalic and atraumatic.      Right Ear: External ear normal.      Left Ear: External ear normal.      Nose: Nose normal.   Eyes:      Extraocular Movements: Extraocular movements intact.      Pupils: Pupils are equal, round, and

## 2025-04-07 NOTE — ASSESSMENT & PLAN NOTE
New problem, mostly in her right leg at night despite drinking water and eating bananas.  Will check labs today.  May need to add daily potassium.

## 2025-04-07 NOTE — ASSESSMENT & PLAN NOTE
Chronic, worsening recently.  Will increase Metoprolol to 25 mg twice a day to see if this helps.  Will also check labs.  Known familial component (mother and maternal grandfather).

## 2025-04-07 NOTE — PATIENT INSTRUCTIONS
Preventive Plan for Florinda Crooks - 4/7/2025  Medicare offers a range of preventive health benefits. Some of the tests and screenings are paid in full while other may be subject to a deductible, co-insurance, and/or copay.  Some of these benefits include a comprehensive review of your medical history including lifestyle, illnesses that may run in your family, and various assessments and screenings as appropriate.  After reviewing your medical record and screening and assessments performed today your provider may have ordered immunizations, labs, imaging, and/or referrals for you.  A list of these orders (if applicable) as well as your Preventive Care list are included within your After Visit Summary for your review.

## 2025-04-07 NOTE — ASSESSMENT & PLAN NOTE
Chronic, stable on Losartan, Metoprolol, Amlodipine and HCTZ.  Will increase Metoprolol due to worsening tremor while waiting for labs.  Consider stopping Amlodipine if this causes her BP to get too low.  Recheck in six months.

## 2025-04-07 NOTE — ASSESSMENT & PLAN NOTE
Wt Readings from Last 3 Encounters:   04/07/25 92.5 kg (204 lb)   02/17/25 94.8 kg (209 lb)   11/22/24 93.8 kg (206 lb 14.4 oz)     Chronic, some improvement over the last couple of months but still not at goal.  Discussed her elevated BMI today, patient hoping to start walking and swimming now that it is warm outside.

## 2025-04-08 ENCOUNTER — RESULTS FOLLOW-UP (OUTPATIENT)
Dept: PRIMARY CARE CLINIC | Facility: CLINIC | Age: 85
End: 2025-04-08

## 2025-05-27 ENCOUNTER — TELEPHONE (OUTPATIENT)
Dept: PRIMARY CARE CLINIC | Facility: CLINIC | Age: 85
End: 2025-05-27

## 2025-05-27 RX ORDER — METOPROLOL SUCCINATE 25 MG/1
25 TABLET, EXTENDED RELEASE ORAL 2 TIMES DAILY
Qty: 180 TABLET | Refills: 2 | Status: SHIPPED | OUTPATIENT
Start: 2025-05-27

## 2025-06-09 RX ORDER — METOPROLOL SUCCINATE 25 MG/1
25 TABLET, EXTENDED RELEASE ORAL DAILY
Qty: 90 TABLET | Refills: 2 | Status: SHIPPED | OUTPATIENT
Start: 2025-06-09

## 2025-07-14 RX ORDER — METOPROLOL SUCCINATE 25 MG/1
25 TABLET, EXTENDED RELEASE ORAL DAILY
Qty: 90 TABLET | Refills: 2 | Status: SHIPPED | OUTPATIENT
Start: 2025-07-14

## 2025-07-14 NOTE — TELEPHONE ENCOUNTER
Meds pended.    Per Pt:  The recent approval for this refill was not accepted by [facility] because it had Dottie Perry as the approver - not Dr. Neely.  Please repeat the approval using her name, and the new increased 2 tab/day agreed to dosage.   Because of these changes and the delay I have just a few pills left, so please expedite  ASAP.

## 2025-07-15 RX ORDER — ROSUVASTATIN CALCIUM 10 MG/1
10 TABLET, COATED ORAL DAILY
Qty: 90 TABLET | Refills: 2 | Status: SHIPPED | OUTPATIENT
Start: 2025-07-15

## 2025-07-15 RX ORDER — METOPROLOL SUCCINATE 25 MG/1
25 TABLET, EXTENDED RELEASE ORAL DAILY
Qty: 90 TABLET | Refills: 2 | OUTPATIENT
Start: 2025-07-15

## 2025-07-29 ENCOUNTER — TELEPHONE (OUTPATIENT)
Dept: PRIMARY CARE CLINIC | Facility: CLINIC | Age: 85
End: 2025-07-29

## 2025-07-29 RX ORDER — METOPROLOL SUCCINATE 25 MG/1
25 TABLET, EXTENDED RELEASE ORAL 2 TIMES DAILY
Qty: 180 TABLET | Refills: 2 | Status: SHIPPED | OUTPATIENT
Start: 2025-07-29

## 2025-07-29 NOTE — TELEPHONE ENCOUNTER
Pt ran out of Metoprolol due to increasing her dosage to 2 x day and [facility] will not refill it until it is approved.

## (undated) DEVICE — REM POLYHESIVE ADULT PATIENT RETURN ELECTRODE: Brand: VALLEYLAB

## (undated) DEVICE — MEDI-VAC NON-CONDUCTIVE SUCTION TUBING: Brand: CARDINAL HEALTH

## (undated) DEVICE — SUTURE ABSRB X-1 REV CUT 1/2 CIR 22MM UD BRAID 27IN SZ 3-0 J458H

## (undated) DEVICE — 3000CC GUARDIAN II: Brand: GUARDIAN

## (undated) DEVICE — KIT PREP TIB CRUCE SZ 5

## (undated) DEVICE — (D)PREP SKN CHLRAPRP APPL 26ML -- CONVERT TO ITEM 371833

## (undated) DEVICE — BANDAGE COMPR SELF ADH 5 YDX4 IN TAN STRL PREMIERPRO LF

## (undated) DEVICE — BLADE SAW PAT RMR PILT H 41MM --

## (undated) DEVICE — T4 HOOD

## (undated) DEVICE — Z DISCONTINUED USE 2744636  DRESSING AQUACEL 14 IN ALG W3.5XL14IN POLYUR FLM CVR W/ HYDRCOLL

## (undated) DEVICE — PRECISION FALCON OSCILLATING TIP SAW CARTRIDGE: Brand: PRECISION FALCON

## (undated) DEVICE — MEDI-VAC YANKAUER SUCTION HANDLE W/BULBOUS TIP: Brand: CARDINAL HEALTH

## (undated) DEVICE — SUTURE PDS II SZ 1 L96IN ABSRB VLT TP-1 L65MM 1/2 CIR Z880G

## (undated) DEVICE — BUTTON SWITCH PENCIL BLADE ELECTRODE, HOLSTER: Brand: EDGE

## (undated) DEVICE — SYRINGE CATH TIP 50ML

## (undated) DEVICE — SUTURE STRATAFIX SYMMETRIC PDS + SZ 2-0 L18IN ABSRB VLT SXPP1A403

## (undated) DEVICE — SOLUTION IV 1000ML 0.9% SOD CHL

## (undated) DEVICE — 2000CC GUARDIAN II: Brand: GUARDIAN

## (undated) DEVICE — PACK PROCEDURE SURG TOT KNEE

## (undated) DEVICE — X-LARGE COTTON GLOVE: Brand: DEROYAL

## (undated) DEVICE — PHOTON BLADE

## (undated) DEVICE — TRAY CATH 16F DRN BG LTX -- CONVERT TO ITEM 363158

## (undated) DEVICE — SOLUTION IRRIG 3000ML 0.9% SOD CHL FLX CONT 0797208] ICU MEDICAL INC]

## (undated) DEVICE — SUT ETHBND 2 30IN LR DA GRN --

## (undated) DEVICE — SYR LR LCK 1ML GRAD NSAF 30ML --

## (undated) DEVICE — SKIN STAPLER: Brand: SIGNET

## (undated) DEVICE — SOLUTION IV DEXTROSE/SALINE 5%-0.9% 500ML - 500ML

## (undated) DEVICE — FAN SPRAY KIT: Brand: PULSAVAC®

## (undated) DEVICE — SUTURE VCRL SZ 1 L27IN ABSRB UD L36MM CP-1 1/2 CIR REV CUT J268H

## (undated) DEVICE — SYR 50ML LR LCK 1ML GRAD NSAF --

## (undated) DEVICE — 3M™ STERI-DRAPE™ INSTRUMENT POUCH 1018: Brand: STERI-DRAPE™

## (undated) DEVICE — Device

## (undated) DEVICE — SYR 10ML LUER LOK 1/5ML GRAD --

## (undated) DEVICE — DRAPE,TOP,102X53,STERILE: Brand: MEDLINE

## (undated) DEVICE — TIBIAL BEARING INSERT
Type: IMPLANTABLE DEVICE | Site: KNEE | Status: NON-FUNCTIONAL
Brand: TRIATHLON
Removed: 2018-04-23

## (undated) DEVICE — CURETTE BNE CEM 10IN DISP --

## (undated) DEVICE — TRAY PREP DRY W/ PREM GLV 2 APPL 6 SPNG 2 UNDPD 1 OVERWRAP